# Patient Record
Sex: MALE | Race: WHITE | Employment: FULL TIME | URBAN - METROPOLITAN AREA
[De-identification: names, ages, dates, MRNs, and addresses within clinical notes are randomized per-mention and may not be internally consistent; named-entity substitution may affect disease eponyms.]

---

## 2018-05-21 ENCOUNTER — NURSE TRIAGE (OUTPATIENT)
Dept: ADMINISTRATIVE | Facility: CLINIC | Age: 33
End: 2018-05-21

## 2018-05-21 NOTE — TELEPHONE ENCOUNTER
Reason for Disposition   Chest pain lasting longer than 5 minutes    Protocols used: [facility] CHEST PAIN-A-[facility]    Rahul is calling to request to establish care with a PCP.  States is having chest pressure that has been present for about two weeks.  Per protocol advised ER for cardiac clearance.  Will go to ER and once cleared will call back for an appointment.

## 2019-02-19 ENCOUNTER — OFFICE VISIT (OUTPATIENT)
Dept: URGENT CARE | Facility: CLINIC | Age: 34
End: 2019-02-19
Payer: COMMERCIAL

## 2019-02-19 VITALS
DIASTOLIC BLOOD PRESSURE: 84 MMHG | SYSTOLIC BLOOD PRESSURE: 140 MMHG | TEMPERATURE: 98 F | OXYGEN SATURATION: 99 % | HEART RATE: 86 BPM | HEIGHT: 70 IN | RESPIRATION RATE: 12 BRPM | WEIGHT: 180 LBS | BODY MASS INDEX: 25.77 KG/M2

## 2019-02-19 DIAGNOSIS — J06.9 VIRAL URI WITH COUGH: Primary | ICD-10-CM

## 2019-02-19 PROCEDURE — 3008F PR BODY MASS INDEX (BMI) DOCUMENTED: ICD-10-PCS | Mod: CPTII,S$GLB,, | Performed by: NURSE PRACTITIONER

## 2019-02-19 PROCEDURE — 99203 OFFICE O/P NEW LOW 30 MIN: CPT | Mod: S$GLB,,, | Performed by: NURSE PRACTITIONER

## 2019-02-19 PROCEDURE — 3008F BODY MASS INDEX DOCD: CPT | Mod: CPTII,S$GLB,, | Performed by: NURSE PRACTITIONER

## 2019-02-19 PROCEDURE — 99203 PR OFFICE/OUTPT VISIT, NEW, LEVL III, 30-44 MIN: ICD-10-PCS | Mod: S$GLB,,, | Performed by: NURSE PRACTITIONER

## 2019-02-19 RX ORDER — FLUTICASONE PROPIONATE 50 MCG
1 SPRAY, SUSPENSION (ML) NASAL 2 TIMES DAILY
Qty: 1 BOTTLE | Refills: 0 | Status: SHIPPED | OUTPATIENT
Start: 2019-02-19 | End: 2019-03-05

## 2019-02-19 RX ORDER — ALBUTEROL SULFATE 90 UG/1
2 AEROSOL, METERED RESPIRATORY (INHALATION) EVERY 6 HOURS PRN
Qty: 18 G | Refills: 0 | Status: SHIPPED | OUTPATIENT
Start: 2019-02-19 | End: 2019-10-28

## 2019-02-19 RX ORDER — MULTIVITAMIN
1 TABLET ORAL
COMMUNITY

## 2019-02-19 NOTE — PATIENT INSTRUCTIONS
Please use the prescribed inhaler prior to activity when coughing begins as you described.    Viral Upper Respiratory Illness (Adult)  You have a viral upper respiratory illness (URI), which is another term for the common cold. This illness is contagious during the first few days. It is spread through the air by coughing and sneezing. It may also be spread by direct contact (touching the sick person and then touching your own eyes, nose, or mouth). Frequent handwashing will decrease risk of spread. Most viral illnesses go away within 7 to 10 days with rest and simple home remedies. Sometimes the illness may last for several weeks. Antibiotics will not kill a virus, and they are generally not prescribed for this condition.    Home care  · If symptoms are severe, rest at home for the first 2 to 3 days. When you resume activity, don't let yourself get too tired.  · Avoid being exposed to cigarette smoke (yours or others).  · You may use acetaminophen or ibuprofen to control pain and fever, unless another medicine was prescribed. (Note: If you have chronic liver or kidney disease, have ever had a stomach ulcer or gastrointestinal bleeding, or are taking blood-thinning medicines, talk with your healthcare provider before using these medicines.) Aspirin should never be given to anyone under 18 years of age who is ill with a viral infection or fever. It may cause severe liver or brain damage.  · Your appetite may be poor, so a light diet is fine. Avoid dehydration by drinking 6 to 8 glasses of fluids per day (water, soft drinks, juices, tea, or soup). Extra fluids will help loosen secretions in the nose and lungs.  · Over-the-counter cold medicines will not shorten the length of time youre sick, but they may be helpful for the following symptoms: cough, sore throat, and nasal and sinus congestion. (Note: Do not use decongestants if you have high blood pressure.)  Follow-up care  Follow up with your healthcare provider,  or as advised.  When to seek medical advice  Call your healthcare provider right away if any of these occur:  · Cough with lots of colored sputum (mucus)  · Severe headache; face, neck, or ear pain  · Difficulty swallowing due to throat pain  · Fever of 100.4°F (38°C)  Call 911, or get immediate medical care  Call emergency services right away if any of these occur:  · Chest pain, shortness of breath, wheezing, or difficulty breathing  · Coughing up blood  · Inability to swallow due to throat pain  Date Last Reviewed: 9/13/2015  © 4345-3281 [employer]. [address] [place] [address]. All rights reserved. This information is not intended as a substitute for professional medical care. Always follow your healthcare professional's instructions.

## 2019-02-19 NOTE — PROGRESS NOTES
"Subjective:       Patient ID: Rahul Farrar is a 33 y.o. male.    Vitals:  height is 5' 10" (1.778 m) and weight is 81.6 kg (180 lb). His oral temperature is 97.9 °F (36.6 °C). His blood pressure is 140/84 (abnormal) and his pulse is 86. His respiration is 12 and oxygen saturation is 99%.     Chief Complaint: URI    Patient presents with productive cough, chest congestion, and chest tightness (due to the cough) for about a week.  He states that it started off with a sore throat, but it has now subsided.  He has been taking OTC Nyquil & Sudafed.      No fever. Some chills first day or two. No known sick contacts. No N/V/D.    Patient states coughing begins with activity and after drinking coffee.  Previous reports of "chest tightness" in chart.  Possible airway restriction (asthma/COPD, etc.). Will Rx albuterol inhaler to monitor for improvement.        URI    This is a new problem. The current episode started in the past 7 days. The problem has been gradually worsening. There has been no fever. Associated symptoms include congestion, coughing, headaches, rhinorrhea, sneezing and swollen glands. Pertinent negatives include no abdominal pain, chest pain, diarrhea, dysuria, ear pain, joint pain, joint swelling, nausea, neck pain, plugged ear sensation, rash, sinus pain, sore throat, vomiting or wheezing. He has tried decongestant for the symptoms. The treatment provided no relief.       Constitution: Negative for chills, sweating, fatigue and fever.   HENT: Positive for congestion, postnasal drip, sinus pressure and voice change. Negative for ear pain, sinus pain and sore throat.    Neck: Negative for neck pain and painful lymph nodes.   Cardiovascular: Negative for chest pain.   Eyes: Negative for eye redness.   Respiratory: Positive for chest tightness (due to cough), cough and sputum production (greenish). Negative for bloody sputum, COPD, shortness of breath, stridor, wheezing and asthma.    Gastrointestinal: " Negative for abdominal pain, nausea, vomiting and diarrhea.   Genitourinary: Negative for dysuria.   Musculoskeletal: Negative for muscle ache.   Skin: Negative for rash.   Allergic/Immunologic: Positive for sneezing. Negative for seasonal allergies and asthma.   Neurological: Positive for headaches.   Hematologic/Lymphatic: Negative for swollen lymph nodes.       Objective:      Physical Exam   Constitutional: He is oriented to person, place, and time. He appears well-developed and well-nourished. He is cooperative.  Non-toxic appearance. He does not appear ill. No distress.   HENT:   Head: Normocephalic and atraumatic.   Right Ear: Hearing, tympanic membrane, external ear and ear canal normal.   Left Ear: Hearing, tympanic membrane, external ear and ear canal normal.   Nose: Rhinorrhea present. No mucosal edema or nasal deformity. No epistaxis. Right sinus exhibits no maxillary sinus tenderness and no frontal sinus tenderness. Left sinus exhibits no maxillary sinus tenderness and no frontal sinus tenderness.   Mouth/Throat: Uvula is midline and mucous membranes are normal. No trismus in the jaw. Normal dentition. No uvula swelling. Posterior oropharyngeal erythema present.       Eyes: Conjunctivae and lids are normal. No scleral icterus.   Sclera clear bilat   Neck: Trachea normal, full passive range of motion without pain and phonation normal. Neck supple.   Cardiovascular: Normal rate, regular rhythm, normal heart sounds, intact distal pulses and normal pulses.   Pulmonary/Chest: Effort normal and breath sounds normal. No stridor. No respiratory distress. He has no decreased breath sounds. He has no wheezes.   Abdominal: Soft. Normal appearance and bowel sounds are normal. He exhibits no distension. There is no tenderness.   Musculoskeletal: Normal range of motion. He exhibits no edema or deformity.   Neurological: He is alert and oriented to person, place, and time. He exhibits normal muscle tone. Coordination  normal.   Skin: Skin is warm, dry and intact. He is not diaphoretic. No pallor.   Psychiatric: He has a normal mood and affect. His speech is normal and behavior is normal. Judgment and thought content normal. Cognition and memory are normal.   Nursing note and vitals reviewed.      Assessment:       1. Viral URI with cough        Plan:         Viral URI with cough  -     albuterol (VENTOLIN HFA) 90 mcg/actuation inhaler; Inhale 2 puffs into the lungs every 6 (six) hours as needed for Wheezing. Rescue  Dispense: 18 g; Refill: 0  -     fluticasone (FLONASE) 50 mcg/actuation nasal spray; 1 spray (50 mcg total) by Each Nare route 2 (two) times daily. for 14 days  Dispense: 1 Bottle; Refill: 0      Patient Instructions     Please use the prescribed inhaler prior to activity when coughing begins as you described.    Viral Upper Respiratory Illness (Adult)  You have a viral upper respiratory illness (URI), which is another term for the common cold. This illness is contagious during the first few days. It is spread through the air by coughing and sneezing. It may also be spread by direct contact (touching the sick person and then touching your own eyes, nose, or mouth). Frequent handwashing will decrease risk of spread. Most viral illnesses go away within 7 to 10 days with rest and simple home remedies. Sometimes the illness may last for several weeks. Antibiotics will not kill a virus, and they are generally not prescribed for this condition.    Home care  · If symptoms are severe, rest at home for the first 2 to 3 days. When you resume activity, don't let yourself get too tired.  · Avoid being exposed to cigarette smoke (yours or others).  · You may use acetaminophen or ibuprofen to control pain and fever, unless another medicine was prescribed. (Note: If you have chronic liver or kidney disease, have ever had a stomach ulcer or gastrointestinal bleeding, or are taking blood-thinning medicines, talk with your healthcare  provider before using these medicines.) Aspirin should never be given to anyone under 18 years of age who is ill with a viral infection or fever. It may cause severe liver or brain damage.  · Your appetite may be poor, so a light diet is fine. Avoid dehydration by drinking 6 to 8 glasses of fluids per day (water, soft drinks, juices, tea, or soup). Extra fluids will help loosen secretions in the nose and lungs.  · Over-the-counter cold medicines will not shorten the length of time youre sick, but they may be helpful for the following symptoms: cough, sore throat, and nasal and sinus congestion. (Note: Do not use decongestants if you have high blood pressure.)  Follow-up care  Follow up with your healthcare provider, or as advised.  When to seek medical advice  Call your healthcare provider right away if any of these occur:  · Cough with lots of colored sputum (mucus)  · Severe headache; face, neck, or ear pain  · Difficulty swallowing due to throat pain  · Fever of 100.4°F (38°C)  Call 911, or get immediate medical care  Call emergency services right away if any of these occur:  · Chest pain, shortness of breath, wheezing, or difficulty breathing  · Coughing up blood  · Inability to swallow due to throat pain  Date Last Reviewed: 9/13/2015  © 4020-6864 [employer]. [address] [place] [address], [place] [address]. All rights reserved. This information is not intended as a substitute for professional medical care. Always follow your healthcare professional's instructions.

## 2019-02-22 ENCOUNTER — TELEPHONE (OUTPATIENT)
Dept: URGENT CARE | Facility: CLINIC | Age: 34
End: 2019-02-22

## 2019-04-28 ENCOUNTER — HOSPITAL ENCOUNTER (EMERGENCY)
Facility: HOSPITAL | Age: 34
Discharge: ANOTHER HOSPITAL | End: 2019-04-28
Attending: EMERGENCY MEDICINE
Payer: COMMERCIAL

## 2019-04-28 VITALS
RESPIRATION RATE: 16 BRPM | BODY MASS INDEX: 27 KG/M2 | WEIGHT: 188.19 LBS | OXYGEN SATURATION: 100 % | HEART RATE: 81 BPM | SYSTOLIC BLOOD PRESSURE: 131 MMHG | TEMPERATURE: 98 F | DIASTOLIC BLOOD PRESSURE: 68 MMHG

## 2019-04-28 DIAGNOSIS — S39.840A FRACTURE OF CORPUS CAVERNOSUM PENIS, INITIAL ENCOUNTER: Primary | ICD-10-CM

## 2019-04-28 PROCEDURE — 99285 EMERGENCY DEPT VISIT HI MDM: CPT | Mod: 25

## 2019-04-28 PROCEDURE — 63600175 PHARM REV CODE 636 W HCPCS: Performed by: EMERGENCY MEDICINE

## 2019-04-28 PROCEDURE — 96372 THER/PROPH/DIAG INJ SC/IM: CPT

## 2019-04-28 RX ADMIN — LORAZEPAM 0.5 MG: 2 INJECTION INTRAMUSCULAR; INTRAVENOUS at 06:04

## 2019-04-28 NOTE — ED PROVIDER NOTES
"SCRIBE #1 NOTE: I, Kelsie Rea, am scribing for, and in the presence of, Luis M Blanca Jr., MD. I have scribed the entire note.       History     Chief Complaint   Patient presents with    Penis Injury     "I think I fracutred my penis through intercourse"; was sent here by urgent care; penis is swollen, throbbing, painful, and turning purple     Review of patient's allergies indicates:  No Known Allergies      History of Present Illness     HPI    4/28/2019, 6:40 PM  History obtained from the patient      History of Present Illness: Rahul Farrar is a 33 y.o. male patient with no significant PMHx who presents to the Emergency Department for evaluation of a penis injury which onset suddenly around one hour pta. Pt states that he was having sexual intercourse when he injured his penis. Pt was initially seen at urgent care who advised to pt to come to the ED for further evaluation. He reports that the penis was erect at the time of injury, and that his penis has become progressively swollen over time. Symptoms are constant and moderate in severity. No mitigating or exacerbating factors reported. Associated sxs include penile swelling, penile ecchymosis, penile pain, and anxiety. Patient denies any CP, SOB, n/v/d, abdominal pain, difficulty urinating, hematuria, penile discharge, urine decreases, testicular pain, and all other sxs at this time.  No further complaints or concerns at this time.     Arrival mode: Personal vehicle      PCP: Primary Doctor No        Past Medical History:  History reviewed. No pertinent medical history.    Past Surgical History:  Past Surgical History:   Procedure Laterality Date    FOREARM FRACTURE SURGERY      NOSE SURGERY      TYMPANOSTOMY TUBE PLACEMENT           Family History:  Family History   Problem Relation Age of Onset    Diabetes Maternal Grandfather        Social History:  Social History     Tobacco Use    Smoking status: Never Smoker    Smokeless tobacco: Current " User     Types: Chew   Substance and Sexual Activity    Alcohol use: Yes     Frequency: 2-4 times a month     Drinks per session: 1 or 2    Drug use: No    Sexual activity: Female        Review of Systems     Review of Systems   Constitutional: Negative for chills and fever.   HENT: Negative for congestion and sore throat.    Respiratory: Negative for cough and shortness of breath.    Cardiovascular: Negative for chest pain and palpitations.   Gastrointestinal: Negative for abdominal pain, diarrhea, nausea and vomiting.   Genitourinary: Positive for genital sores, penile pain and penile swelling. Negative for decreased urine volume, difficulty urinating, discharge, dysuria, hematuria and testicular pain.        + penile ecchymosis   Musculoskeletal: Negative for back pain and neck pain.   Skin: Negative for rash.   Neurological: Negative for weakness and headaches.   Hematological: Does not bruise/bleed easily.   Psychiatric/Behavioral: The patient is nervous/anxious.    All other systems reviewed and are negative.     Physical Exam     Initial Vitals [04/28/19 1816]   BP Pulse Resp Temp SpO2   132/69 74 18 98.4 °F (36.9 °C) 99 %      MAP       --          Physical Exam  Nursing Notes and Vital Signs Reviewed.  Constitutional: Patient is in no acute distress. Well-developed and well-nourished.  Head: Atraumatic. Normocephalic.  Eyes: PERRL. EOM intact. Conjunctivae are not pale. No scleral icterus.  ENT: Mucous membranes are moist. Oropharynx is clear and symmetric.    Neck: Supple. Full ROM. No lymphadenopathy.  Cardiovascular: Regular rate. Regular rhythm. No murmurs, rubs, or gallops. Distal pulses are 2+ and symmetric.  Pulmonary/Chest: No respiratory distress. Clear to auscultation bilaterally. No wheezing or rales.  Abdominal: Soft and non-distended.  There is no tenderness.  No rebound, guarding, or rigidity. Good bowel sounds.  Genitourinary: No CVA tenderness. Penis is no longer erect. Ecchymosis and  swelling of the penis shaft, but not involving the glans.  Musculoskeletal: Moves all extremities. No obvious deformities. No edema. No calf tenderness.  Skin: Warm and dry.  Neurological:  Alert, awake, and appropriate.  Normal speech.  No acute focal neurological deficits are appreciated.  Psychiatric: Normal affect. Good eye contact. Appropriate in content.     ED Course   Procedures  ED Vital Signs:  Vitals:    04/28/19 1816 04/28/19 1900   BP: 132/69 (!) 141/73   Pulse: 74 79   Resp: 18 18   Temp: 98.4 °F (36.9 °C)    TempSrc: Oral    SpO2: 99% 99%   Weight: 85.3 kg (188 lb 2.6 oz)        Abnormal Lab Results:  Labs Reviewed - No data to display   Imaging Results:  Imaging Results    None                 The Emergency Provider reviewed the vital signs and test results, which are outlined above.     ED Discussion     7:10 PM: Re-evaluated pt. Informed pt and family that there are no urology services available at this time. I have discussed test results, shared treatment plan, and the need for transfer with patient and family at bedside. All historical, clinical, radiographic, and laboratory findings were reviewed with the patient/family in detail. Patient will be transferred by private vehicle. Patient understands that there could be unforeseen motor vehicle accidents or loss of vital signs that could result in potential death or permanent disability. Pt and family express understanding at this time and agree with all information. All questions answered. Pt and family have no further questions or concerns at this time. Pt is ready for transfer.     7: 10 PM: Consult with Dr. Crews (Emergency Medicine) at [facility] concerning pt. There are no urology services, which the patient requires, offered at [facility] at this time. Dr. Crews expresses understanding and will accept transfer for urology care.  Accepting Facility: [facility]  Accepting Physician: Dr. Crews      ED Medication(s):  Medications    lorazepam (ATIVAN) injection 0.5 mg (0.5 mg Intramuscular Given 4/28/19 7921)       New Prescriptions    No medications on file                             Scribe Attestation:   Scribe #1: I performed the above scribed service and the documentation accurately describes the services I performed. I attest to the accuracy of the note.     Attending:   Physician Attestation Statement for Scribe #1: I, Luis M Blanca Jr., MD, personally performed the services described in this documentation, as scribed by Kelsie Rea, in my presence, and it is both accurate and complete.           Clinical Impression       ICD-10-CM ICD-9-CM   1. Fracture of corpus cavernosum penis, initial encounter S39.840A 959.13         Disposition:   Disposition: Transferred  Condition: Stable         Luis M Blanca Jr., MD  04/28/19 3076

## 2019-10-28 ENCOUNTER — OFFICE VISIT (OUTPATIENT)
Dept: INTERNAL MEDICINE | Facility: CLINIC | Age: 34
End: 2019-10-28
Payer: COMMERCIAL

## 2019-10-28 VITALS
WEIGHT: 202.81 LBS | HEIGHT: 70 IN | OXYGEN SATURATION: 99 % | SYSTOLIC BLOOD PRESSURE: 122 MMHG | BODY MASS INDEX: 29.03 KG/M2 | TEMPERATURE: 98 F | HEART RATE: 65 BPM | DIASTOLIC BLOOD PRESSURE: 72 MMHG

## 2019-10-28 DIAGNOSIS — M79.672 LEFT FOOT PAIN: ICD-10-CM

## 2019-10-28 DIAGNOSIS — Z00.00 ROUTINE CHECK-UP: Primary | ICD-10-CM

## 2019-10-28 PROCEDURE — 99203 PR OFFICE/OUTPT VISIT, NEW, LEVL III, 30-44 MIN: ICD-10-PCS | Mod: 25,S$GLB,, | Performed by: NURSE PRACTITIONER

## 2019-10-28 PROCEDURE — 3008F PR BODY MASS INDEX (BMI) DOCUMENTED: ICD-10-PCS | Mod: CPTII,S$GLB,, | Performed by: NURSE PRACTITIONER

## 2019-10-28 PROCEDURE — 90471 IMMUNIZATION ADMIN: CPT | Mod: S$GLB,,, | Performed by: NURSE PRACTITIONER

## 2019-10-28 PROCEDURE — 90686 FLU VACCINE (QUAD) GREATER THAN OR EQUAL TO 3YO PRESERVATIVE FREE IM: ICD-10-PCS | Mod: S$GLB,,, | Performed by: NURSE PRACTITIONER

## 2019-10-28 PROCEDURE — 90471 FLU VACCINE (QUAD) GREATER THAN OR EQUAL TO 3YO PRESERVATIVE FREE IM: ICD-10-PCS | Mod: S$GLB,,, | Performed by: NURSE PRACTITIONER

## 2019-10-28 PROCEDURE — 3008F BODY MASS INDEX DOCD: CPT | Mod: CPTII,S$GLB,, | Performed by: NURSE PRACTITIONER

## 2019-10-28 PROCEDURE — 99203 OFFICE O/P NEW LOW 30 MIN: CPT | Mod: 25,S$GLB,, | Performed by: NURSE PRACTITIONER

## 2019-10-28 PROCEDURE — 90686 IIV4 VACC NO PRSV 0.5 ML IM: CPT | Mod: S$GLB,,, | Performed by: NURSE PRACTITIONER

## 2019-10-28 PROCEDURE — 99999 PR PBB SHADOW E&M-EST. PATIENT-LVL III: CPT | Mod: PBBFAC,,, | Performed by: NURSE PRACTITIONER

## 2019-10-28 PROCEDURE — 99999 PR PBB SHADOW E&M-EST. PATIENT-LVL III: ICD-10-PCS | Mod: PBBFAC,,, | Performed by: NURSE PRACTITIONER

## 2019-10-28 NOTE — PROGRESS NOTES
"Subjective:      Patient ID: Rahul Farrar is a 34 y.o. male.    Chief Complaint: Establish Care    HPI:  Patient is looking to est care with a pcp at this location.  Wants to keep up with his healthcare.  Says he feels ok, only has pain to his left foot, wears steel-toe boots at work.  Says it feels like he is walking on a rock in his shoe.  Says he has trimmed down one area a few times, but it comes back    No past medical history on file.    Past Surgical History:   Procedure Laterality Date    FOREARM FRACTURE SURGERY      NOSE SURGERY      SINUS SURGERY      TYMPANOSTOMY TUBE PLACEMENT         Lab Results   Component Value Date    WBC 7.07 07/29/2014    HGB 16.3 07/29/2014    HCT 45.4 07/29/2014     07/29/2014    CHOL 158 03/20/2009    TRIG 62 03/20/2009    HDL 35 (L) 03/20/2009    ALT 23 07/29/2014    AST 19 07/29/2014     07/29/2014    K 3.3 (L) 07/29/2014     07/29/2014    CREATININE 1.2 07/29/2014    BUN 11 07/29/2014    CO2 24 07/29/2014    TSH 0.524 07/29/2014       /72 (BP Location: Left arm)   Pulse 65   Temp 98.2 °F (36.8 °C) (Tympanic)   Ht 5' 10" (1.778 m)   Wt 92 kg (202 lb 13.2 oz)   SpO2 99%   BMI 29.10 kg/m²       Review of Systems   Constitutional: Negative for appetite change, chills, diaphoresis and fever.   HENT: Negative for congestion, ear pain, postnasal drip, rhinorrhea, sneezing, sore throat and trouble swallowing.    Eyes: Negative for photophobia, pain and visual disturbance.   Respiratory: Negative for apnea, cough, choking, chest tightness, shortness of breath and wheezing.    Cardiovascular: Negative for chest pain, palpitations and leg swelling.   Gastrointestinal: Negative for abdominal pain, constipation, diarrhea, nausea and vomiting.   Genitourinary: Negative for decreased urine volume, difficulty urinating, dysuria, hematuria and urgency.   Musculoskeletal: Negative for arthralgias, gait problem, joint swelling and myalgias.   Skin: " Negative for rash.   Neurological: Negative for dizziness, tremors, seizures, syncope, weakness, light-headedness, numbness and headaches.   Psychiatric/Behavioral: Negative for agitation, confusion, decreased concentration, hallucinations and sleep disturbance. The patient is not nervous/anxious.       Objective:     Physical Exam   Constitutional: He is oriented to person, place, and time. He appears well-developed and well-nourished. No distress.   Musculoskeletal:   Normal gait  Plantar side of left foot, lateral, corn noted from friction, two other places near are tender, thick skin, ? Corn or plantar warts   Neurological: He is alert and oriented to person, place, and time.   Skin: Skin is warm and dry.   Psychiatric: He has a normal mood and affect. His behavior is normal.     Assessment:      1. Routine check-up    2. Left foot pain      Plan:   Routine check-up  -     Lipid panel; Future; Expected date: 01/26/2020  -     Comprehensive metabolic panel; Future; Expected date: 01/26/2020  -     TSH; Future; Expected date: 01/26/2020  -     CBC auto differential; Future; Expected date: 01/26/2020    Left foot pain  -     Ambulatory Referral to Podiatry    Other orders  -     Influenza - Quadrivalent (PF)    he had a regular flu shot, will see podiatry for foot care.  Will have fasting labs and see Dr Long to est care as his pcp per his request      Current Outpatient Medications:     multivitamin (ONE DAILY MULTIVITAMIN) per tablet, Take 1 tablet by mouth., Disp: , Rfl:

## 2019-10-30 ENCOUNTER — LAB VISIT (OUTPATIENT)
Dept: LAB | Facility: HOSPITAL | Age: 34
End: 2019-10-30
Attending: FAMILY MEDICINE
Payer: COMMERCIAL

## 2019-10-30 ENCOUNTER — OFFICE VISIT (OUTPATIENT)
Dept: PODIATRY | Facility: CLINIC | Age: 34
End: 2019-10-30
Payer: COMMERCIAL

## 2019-10-30 ENCOUNTER — OFFICE VISIT (OUTPATIENT)
Dept: INTERNAL MEDICINE | Facility: CLINIC | Age: 34
End: 2019-10-30
Payer: COMMERCIAL

## 2019-10-30 VITALS
WEIGHT: 205.25 LBS | OXYGEN SATURATION: 98 % | TEMPERATURE: 97 F | DIASTOLIC BLOOD PRESSURE: 78 MMHG | HEART RATE: 57 BPM | HEIGHT: 70 IN | SYSTOLIC BLOOD PRESSURE: 126 MMHG | BODY MASS INDEX: 29.38 KG/M2

## 2019-10-30 VITALS
SYSTOLIC BLOOD PRESSURE: 119 MMHG | WEIGHT: 202.81 LBS | BODY MASS INDEX: 29.03 KG/M2 | HEIGHT: 70 IN | HEART RATE: 75 BPM | DIASTOLIC BLOOD PRESSURE: 71 MMHG

## 2019-10-30 DIAGNOSIS — Z00.00 ROUTINE ADULT HEALTH MAINTENANCE: Primary | ICD-10-CM

## 2019-10-30 DIAGNOSIS — Z00.00 ROUTINE CHECK-UP: ICD-10-CM

## 2019-10-30 DIAGNOSIS — R60.0 BILATERAL EDEMA OF LOWER EXTREMITY: ICD-10-CM

## 2019-10-30 DIAGNOSIS — Q82.8 POROKERATOSIS: Primary | ICD-10-CM

## 2019-10-30 LAB
ALBUMIN SERPL BCP-MCNC: 4 G/DL (ref 3.5–5.2)
ALP SERPL-CCNC: 44 U/L (ref 55–135)
ALT SERPL W/O P-5'-P-CCNC: 25 U/L (ref 10–44)
ANION GAP SERPL CALC-SCNC: 7 MMOL/L (ref 8–16)
AST SERPL-CCNC: 21 U/L (ref 10–40)
BASOPHILS # BLD AUTO: 0.03 K/UL (ref 0–0.2)
BASOPHILS NFR BLD: 0.7 % (ref 0–1.9)
BILIRUB SERPL-MCNC: 1.1 MG/DL (ref 0.1–1)
BUN SERPL-MCNC: 11 MG/DL (ref 6–20)
CALCIUM SERPL-MCNC: 9.2 MG/DL (ref 8.7–10.5)
CHLORIDE SERPL-SCNC: 105 MMOL/L (ref 95–110)
CHOLEST SERPL-MCNC: 157 MG/DL (ref 120–199)
CHOLEST/HDLC SERPL: 2.7 {RATIO} (ref 2–5)
CO2 SERPL-SCNC: 27 MMOL/L (ref 23–29)
CREAT SERPL-MCNC: 0.9 MG/DL (ref 0.5–1.4)
DIFFERENTIAL METHOD: ABNORMAL
EOSINOPHIL # BLD AUTO: 0.2 K/UL (ref 0–0.5)
EOSINOPHIL NFR BLD: 3.3 % (ref 0–8)
ERYTHROCYTE [DISTWIDTH] IN BLOOD BY AUTOMATED COUNT: 11.1 % (ref 11.5–14.5)
EST. GFR  (AFRICAN AMERICAN): >60 ML/MIN/1.73 M^2
EST. GFR  (NON AFRICAN AMERICAN): >60 ML/MIN/1.73 M^2
GLUCOSE SERPL-MCNC: 92 MG/DL (ref 70–110)
HCT VFR BLD AUTO: 44.7 % (ref 40–54)
HDLC SERPL-MCNC: 59 MG/DL (ref 40–75)
HDLC SERPL: 37.6 % (ref 20–50)
HGB BLD-MCNC: 15.2 G/DL (ref 14–18)
IMM GRANULOCYTES # BLD AUTO: 0 K/UL (ref 0–0.04)
IMM GRANULOCYTES NFR BLD AUTO: 0 % (ref 0–0.5)
LDLC SERPL CALC-MCNC: 86 MG/DL (ref 63–159)
LYMPHOCYTES # BLD AUTO: 1.9 K/UL (ref 1–4.8)
LYMPHOCYTES NFR BLD: 42.3 % (ref 18–48)
MCH RBC QN AUTO: 30.8 PG (ref 27–31)
MCHC RBC AUTO-ENTMCNC: 34 G/DL (ref 32–36)
MCV RBC AUTO: 91 FL (ref 82–98)
MONOCYTES # BLD AUTO: 0.5 K/UL (ref 0.3–1)
MONOCYTES NFR BLD: 11.5 % (ref 4–15)
NEUTROPHILS # BLD AUTO: 1.9 K/UL (ref 1.8–7.7)
NEUTROPHILS NFR BLD: 42.2 % (ref 38–73)
NONHDLC SERPL-MCNC: 98 MG/DL
NRBC BLD-RTO: 0 /100 WBC
PLATELET # BLD AUTO: 233 K/UL (ref 150–350)
PMV BLD AUTO: 11.8 FL (ref 9.2–12.9)
POTASSIUM SERPL-SCNC: 4.3 MMOL/L (ref 3.5–5.1)
PROT SERPL-MCNC: 6.7 G/DL (ref 6–8.4)
RBC # BLD AUTO: 4.94 M/UL (ref 4.6–6.2)
SODIUM SERPL-SCNC: 139 MMOL/L (ref 136–145)
TRIGL SERPL-MCNC: 60 MG/DL (ref 30–150)
TSH SERPL DL<=0.005 MIU/L-ACNC: 1.09 UIU/ML (ref 0.4–4)
WBC # BLD AUTO: 4.52 K/UL (ref 3.9–12.7)

## 2019-10-30 PROCEDURE — 3008F PR BODY MASS INDEX (BMI) DOCUMENTED: ICD-10-PCS | Mod: CPTII,S$GLB,, | Performed by: FAMILY MEDICINE

## 2019-10-30 PROCEDURE — 99999 PR PBB SHADOW E&M-EST. PATIENT-LVL III: ICD-10-PCS | Mod: PBBFAC,,, | Performed by: PODIATRIST

## 2019-10-30 PROCEDURE — 80053 COMPREHEN METABOLIC PANEL: CPT

## 2019-10-30 PROCEDURE — 80061 LIPID PANEL: CPT

## 2019-10-30 PROCEDURE — 99243 OFF/OP CNSLTJ NEW/EST LOW 30: CPT | Mod: S$GLB,,, | Performed by: PODIATRIST

## 2019-10-30 PROCEDURE — 99999 PR PBB SHADOW E&M-EST. PATIENT-LVL III: CPT | Mod: PBBFAC,,, | Performed by: FAMILY MEDICINE

## 2019-10-30 PROCEDURE — 36415 COLL VENOUS BLD VENIPUNCTURE: CPT | Mod: PO

## 2019-10-30 PROCEDURE — 99999 PR PBB SHADOW E&M-EST. PATIENT-LVL III: ICD-10-PCS | Mod: PBBFAC,,, | Performed by: FAMILY MEDICINE

## 2019-10-30 PROCEDURE — 3008F BODY MASS INDEX DOCD: CPT | Mod: CPTII,S$GLB,, | Performed by: FAMILY MEDICINE

## 2019-10-30 PROCEDURE — 99213 PR OFFICE/OUTPT VISIT, EST, LEVL III, 20-29 MIN: ICD-10-PCS | Mod: 25,S$GLB,, | Performed by: FAMILY MEDICINE

## 2019-10-30 PROCEDURE — 99243 PR OFFICE CONSULTATION,LEVEL III: ICD-10-PCS | Mod: S$GLB,,, | Performed by: PODIATRIST

## 2019-10-30 PROCEDURE — 84443 ASSAY THYROID STIM HORMONE: CPT

## 2019-10-30 PROCEDURE — 90715 TDAP VACCINE GREATER THAN OR EQUAL TO 7YO IM: ICD-10-PCS | Mod: S$GLB,,, | Performed by: FAMILY MEDICINE

## 2019-10-30 PROCEDURE — 90715 TDAP VACCINE 7 YRS/> IM: CPT | Mod: S$GLB,,, | Performed by: FAMILY MEDICINE

## 2019-10-30 PROCEDURE — 90471 IMMUNIZATION ADMIN: CPT | Mod: S$GLB,,, | Performed by: FAMILY MEDICINE

## 2019-10-30 PROCEDURE — 90471 TDAP VACCINE GREATER THAN OR EQUAL TO 7YO IM: ICD-10-PCS | Mod: S$GLB,,, | Performed by: FAMILY MEDICINE

## 2019-10-30 PROCEDURE — 99999 PR PBB SHADOW E&M-EST. PATIENT-LVL III: CPT | Mod: PBBFAC,,, | Performed by: PODIATRIST

## 2019-10-30 PROCEDURE — 99213 OFFICE O/P EST LOW 20 MIN: CPT | Mod: 25,S$GLB,, | Performed by: FAMILY MEDICINE

## 2019-10-30 PROCEDURE — 85025 COMPLETE CBC W/AUTO DIFF WBC: CPT

## 2019-10-30 RX ORDER — FUROSEMIDE 20 MG/1
20 TABLET ORAL DAILY PRN
Qty: 30 TABLET | Refills: 6 | Status: SHIPPED | OUTPATIENT
Start: 2019-10-30 | End: 2020-01-06

## 2019-10-30 NOTE — PROGRESS NOTES
Subjective:      Patient ID: Rahul Farrar is a 34 y.o. male.    Chief Complaint: Establish Care      Patient here today to establish care.  He was seen by Sukhjinder Serrato on 2 days ago, had routine labs drawn this morning.  He is normally in good health and takes no routine medications.  He does have pain in the bottom of his left foot for which he has in a podiatry appointment this afternoon.  He reports bilateral swelling in his feet intermittently which makes it painful to walk, no current swelling, has never taken diuretics for this.  He reports recently found that he is expecting a child.    Review of Systems   Constitutional: Negative for activity change, appetite change, fatigue and fever.   Eyes: Negative for visual disturbance.   Respiratory: Negative for shortness of breath.    Cardiovascular: Positive for leg swelling. Negative for palpitations.   Gastrointestinal: Negative for abdominal pain, constipation and diarrhea.   Musculoskeletal: Positive for gait problem.   Psychiatric/Behavioral: Negative for sleep disturbance.     History reviewed. No pertinent past medical history.  Past Surgical History:   Procedure Laterality Date    FOREARM FRACTURE SURGERY      NOSE SURGERY      SINUS SURGERY      TYMPANOSTOMY TUBE PLACEMENT       Family History   Problem Relation Age of Onset    Diabetes Maternal Grandfather      Social History     Socioeconomic History    Marital status:      Spouse name: Not on file    Number of children: Not on file    Years of education: Not on file    Highest education level: Not on file   Occupational History    Not on file   Social Needs    Financial resource strain: Not on file    Food insecurity:     Worry: Not on file     Inability: Not on file    Transportation needs:     Medical: Not on file     Non-medical: Not on file   Tobacco Use    Smoking status: Never Smoker    Smokeless tobacco: Current User     Types: Chew   Substance and Sexual Activity     "Alcohol use: Yes     Frequency: 2-4 times a month     Drinks per session: 1 or 2    Drug use: No    Sexual activity: Not on file   Lifestyle    Physical activity:     Days per week: Not on file     Minutes per session: Not on file    Stress: Not on file   Relationships    Social connections:     Talks on phone: Not on file     Gets together: Not on file     Attends Catholic service: Not on file     Active member of club or organization: Not on file     Attends meetings of clubs or organizations: Not on file     Relationship status: Not on file   Other Topics Concern    Not on file   Social History Narrative    Not on file     Review of patient's allergies indicates:  No Known Allergies    Objective:       /78 (BP Location: Right arm)   Pulse (!) 57   Temp 96.7 °F (35.9 °C) (Tympanic)   Ht 5' 10" (1.778 m)   Wt 93.1 kg (205 lb 4 oz)   SpO2 98%   BMI 29.45 kg/m²   Physical Exam   Constitutional: He is oriented to person, place, and time. He appears well-developed and well-nourished. No distress.   HENT:   Head: Normocephalic.   Right Ear: Hearing, tympanic membrane, external ear and ear canal normal.   Left Ear: Hearing, tympanic membrane, external ear and ear canal normal.   Nose: Nose normal. Right sinus exhibits no maxillary sinus tenderness and no frontal sinus tenderness. Left sinus exhibits no maxillary sinus tenderness and no frontal sinus tenderness.   Mouth/Throat: Uvula is midline, oropharynx is clear and moist and mucous membranes are normal. No oropharyngeal exudate.   Eyes: Pupils are equal, round, and reactive to light. Conjunctivae and EOM are normal.   Neck: Normal range of motion. Neck supple.   Cardiovascular: Normal rate, regular rhythm and normal heart sounds.   Pulmonary/Chest: Effort normal and breath sounds normal. No respiratory distress.   Abdominal: Soft. Bowel sounds are normal. There is no tenderness. There is no guarding. No hernia.   Musculoskeletal: Normal range of " motion. He exhibits no edema.   Neurological: He is alert and oriented to person, place, and time.   Skin: Skin is warm and dry. He is not diaphoretic.   Psychiatric: He has a normal mood and affect.   Nursing note and vitals reviewed.    Assessment:     1. Routine adult health maintenance    2. Bilateral edema of lower extremity      Plan:   Routine adult health maintenance    Bilateral edema of lower extremity    Other orders  -     (In Office Administered) Tdap Vaccine  -     furosemide (LASIX) 20 MG tablet; Take 1 tablet (20 mg total) by mouth daily as needed.  Dispense: 30 tablet; Refill: 6      Medication List with Changes/Refills   New Medications    FUROSEMIDE (LASIX) 20 MG TABLET    Take 1 tablet (20 mg total) by mouth daily as needed.   Current Medications    MULTIVITAMIN (ONE DAILY MULTIVITAMIN) PER TABLET    Take 1 tablet by mouth.

## 2019-10-30 NOTE — PROGRESS NOTES
Subjective:       Patient ID: Rahul Farrar is a 34 y.o. male.    Chief Complaint: Foot Pain (Pt c/o pain in L.foot, has 2 bumps on the bottom of hiis foot, rtaes pain 6/10,tennis w/socks, non diabetic pt PCP Dr. Long.)    HPI: Rahul Farrar presents to the office today, due to concern of painful skin lesions at the plantar aspect of bilateral foot, left greater than right.  Patient states discomfort with ambulation due to the skin lesions.  Patient denies local or systemic signs of infection.  Patient was referred by his primary care provider.  Patient denies any prior medical treatment at this time.    Review of patient's allergies indicates:  No Known Allergies    History reviewed. No pertinent past medical history.    Family History   Problem Relation Age of Onset    Diabetes Maternal Grandfather     Heart disease Maternal Grandfather        Social History     Socioeconomic History    Marital status:      Spouse name: Not on file    Number of children: Not on file    Years of education: Not on file    Highest education level: Not on file   Occupational History    Not on file   Social Needs    Financial resource strain: Not on file    Food insecurity:     Worry: Not on file     Inability: Not on file    Transportation needs:     Medical: Not on file     Non-medical: Not on file   Tobacco Use    Smoking status: Never Smoker    Smokeless tobacco: Current User     Types: Chew   Substance and Sexual Activity    Alcohol use: Yes     Frequency: 2-4 times a month     Drinks per session: 1 or 2    Drug use: No    Sexual activity: Not on file   Lifestyle    Physical activity:     Days per week: Not on file     Minutes per session: Not on file    Stress: Not on file   Relationships    Social connections:     Talks on phone: Not on file     Gets together: Not on file     Attends Islam service: Not on file     Active member of club or organization: Not on file     Attends meetings  "of clubs or organizations: Not on file     Relationship status: Not on file   Other Topics Concern    Not on file   Social History Narrative    Not on file       Past Surgical History:   Procedure Laterality Date    FOREARM FRACTURE SURGERY      NOSE SURGERY      SINUS SURGERY      TYMPANOSTOMY TUBE PLACEMENT         Review of Systems   Constitutional: Negative for chills, fatigue and fever.   HENT: Negative for hearing loss.    Eyes: Negative for photophobia and visual disturbance.   Respiratory: Negative for cough, chest tightness, shortness of breath and wheezing.    Cardiovascular: Negative for chest pain and palpitations.   Gastrointestinal: Negative for constipation, diarrhea, nausea and vomiting.   Endocrine: Negative for cold intolerance and heat intolerance.   Genitourinary: Negative for flank pain.   Musculoskeletal: Positive for gait problem. Negative for neck pain and neck stiffness.   Skin: Positive for wound.   Neurological: Negative for light-headedness and headaches.   Psychiatric/Behavioral: Negative for sleep disturbance.          Objective:   /71 (BP Location: Left arm, Patient Position: Sitting, BP Method: Large (Automatic))   Pulse 75   Ht 5' 10" (1.778 m)   Wt 92 kg (202 lb 13.2 oz)   BMI 29.10 kg/m²       Physical Exam  LOWER EXTREMITY PHYSICAL EXAMINATION  NEUROLOGY: Sensation to light touch is intact. Proprioception is intact. Sensation to pin prick is intact. Deep tendon reflexes of the lower extremity are WNL.    DERMATOLOGY: Skin is supple, dry and intact. No ecchymosis is noted.  There several areas IPK/porokeratosis noted to the bilateral foot, left greater than right.  No erythema or cellulitis is noted.     ORTHOPEDIC: Manual Muscle Testing is 5/5 in all planes on the left and right, without pains, with and without resistance. No pains to palpation of the medial or lateral ankle ligaments. No discomfort to palpation of the posterior tibial tendon, peroneal tendon, " Achilles tendon or the anterior ankle tendons.  Rectus foot type is noted. No pain upon range of motion of the midfoot or hindfoot joints. No crepitus upon range of motion and midfoot or hindfoot joints. No ankle pathology is noted. Gait pattern is non-antalgic.     VASCULAR: The right DP pulse is 2/4 and the left DP is 2/4. The right PT pulse is 2/4 and the left PT pulse is 2/4. Proximal to distal, warm to warm. No dependent rubor or elevation palor is noted. Capillary refill time is less than 3 seconds. Hair growth is appreciated to the dorsal foot and digits.    Assessment:     1. Porokeratosis        Plan:     Porokeratosis        Thorough discussion is had with the patient today, concerning the diagnosis, its etiology, and the treatment algorithm at present.  Recommend twice to 3 times daily topical emollient. Did discuss with the patient concerning dry and thin skin in relation to complications due to comorbid states. Patient will purchase OTC Urea 40% and use BID or TID.          No future appointments.

## 2019-12-13 ENCOUNTER — OFFICE VISIT (OUTPATIENT)
Dept: INTERNAL MEDICINE | Facility: CLINIC | Age: 34
End: 2019-12-13
Payer: COMMERCIAL

## 2019-12-13 VITALS
HEART RATE: 82 BPM | WEIGHT: 210.56 LBS | BODY MASS INDEX: 30.14 KG/M2 | SYSTOLIC BLOOD PRESSURE: 124 MMHG | HEIGHT: 70 IN | TEMPERATURE: 99 F | OXYGEN SATURATION: 97 % | DIASTOLIC BLOOD PRESSURE: 80 MMHG

## 2019-12-13 DIAGNOSIS — G47.26 SHIFT WORK SLEEP DISORDER: ICD-10-CM

## 2019-12-13 DIAGNOSIS — R53.83 FATIGUE, UNSPECIFIED TYPE: Primary | ICD-10-CM

## 2019-12-13 PROCEDURE — 99999 PR PBB SHADOW E&M-EST. PATIENT-LVL III: ICD-10-PCS | Mod: PBBFAC,,, | Performed by: FAMILY MEDICINE

## 2019-12-13 PROCEDURE — 99213 PR OFFICE/OUTPT VISIT, EST, LEVL III, 20-29 MIN: ICD-10-PCS | Mod: S$GLB,,, | Performed by: FAMILY MEDICINE

## 2019-12-13 PROCEDURE — 99213 OFFICE O/P EST LOW 20 MIN: CPT | Mod: S$GLB,,, | Performed by: FAMILY MEDICINE

## 2019-12-13 PROCEDURE — 3008F PR BODY MASS INDEX (BMI) DOCUMENTED: ICD-10-PCS | Mod: CPTII,S$GLB,, | Performed by: FAMILY MEDICINE

## 2019-12-13 PROCEDURE — 99999 PR PBB SHADOW E&M-EST. PATIENT-LVL III: CPT | Mod: PBBFAC,,, | Performed by: FAMILY MEDICINE

## 2019-12-13 PROCEDURE — 3008F BODY MASS INDEX DOCD: CPT | Mod: CPTII,S$GLB,, | Performed by: FAMILY MEDICINE

## 2019-12-13 RX ORDER — BUSPIRONE HYDROCHLORIDE 10 MG/1
10 TABLET ORAL 3 TIMES DAILY
Qty: 90 TABLET | Refills: 11 | Status: SHIPPED | OUTPATIENT
Start: 2019-12-13 | End: 2019-12-16

## 2019-12-13 NOTE — PATIENT INSTRUCTIONS
Continue to take melatonin at night . Let me know if you start having trouble sleeping with the melatonin.

## 2019-12-14 NOTE — PROGRESS NOTES
Subjective:      Patient ID: Rahul Farrar is a 34 y.o. male.    Chief Complaint: Stress; Anxiety; and Fatigue      Patient reports increased fatigue, feeling overwhelmed. He does work shift work, often has trouble sleeping. 2 days ago began trying to use melatonin and was able to sleep better with that. However still feels tired all the time, having increased anxiety with multiple increased stressors currently. He does report history of being on antidepressants years ago, does not want to be on something he has to take every day.    Review of Systems   Psychiatric/Behavioral: Positive for sleep disturbance. Negative for decreased concentration, dysphoric mood, hallucinations, self-injury and suicidal ideas. The patient is nervous/anxious.      History reviewed. No pertinent past medical history.       Past Surgical History:   Procedure Laterality Date    FOREARM FRACTURE SURGERY      NOSE SURGERY      SINUS SURGERY      TYMPANOSTOMY TUBE PLACEMENT       Family History   Problem Relation Age of Onset    Diabetes Maternal Grandfather     Heart disease Maternal Grandfather      Social History     Socioeconomic History    Marital status:      Spouse name: Not on file    Number of children: Not on file    Years of education: Not on file    Highest education level: Not on file   Occupational History    Occupation: operation   Social Needs    Financial resource strain: Not on file    Food insecurity:     Worry: Not on file     Inability: Not on file    Transportation needs:     Medical: Not on file     Non-medical: Not on file   Tobacco Use    Smoking status: Never Smoker    Smokeless tobacco: Current User     Types: Chew   Substance and Sexual Activity    Alcohol use: Yes     Frequency: 2-4 times a month     Drinks per session: 1 or 2    Drug use: No    Sexual activity: Yes     Partners: Female   Lifestyle    Physical activity:     Days per week: Not on file     Minutes per session: Not  "on file    Stress: To some extent   Relationships    Social connections:     Talks on phone: Not on file     Gets together: Not on file     Attends Shinto service: Not on file     Active member of club or organization: Not on file     Attends meetings of clubs or organizations: Not on file     Relationship status: Not on file   Other Topics Concern    Not on file   Social History Narrative    Not on file     Review of patient's allergies indicates:  No Known Allergies    Objective:       /80 (BP Location: Right arm, Patient Position: Sitting, BP Method: Large (Manual))   Pulse 82   Temp 99.4 °F (37.4 °C) (Tympanic)   Ht 5' 10" (1.778 m)   Wt 95.5 kg (210 lb 8.6 oz)   SpO2 97%   BMI 30.21 kg/m²   Physical Exam   Constitutional: He appears well-developed and well-nourished. No distress.   Skin: He is not diaphoretic.   Psychiatric: He has a normal mood and affect. His behavior is normal. Judgment and thought content normal.     Assessment:     1. Fatigue, unspecified type    2. Shift work sleep disorder      Plan:   Fatigue, unspecified type  -     CBC auto differential; Future; Expected date: 12/13/2019  -     Testosterone Panel; Future; Expected date: 03/12/2020    Shift work sleep disorder    Other orders  -     busPIRone (BUSPAR) 10 MG tablet; Take 1 tablet (10 mg total) by mouth 3 (three) times daily.  Dispense: 90 tablet; Refill: 11      Medication List with Changes/Refills   New Medications    BUSPIRONE (BUSPAR) 10 MG TABLET    Take 1 tablet (10 mg total) by mouth 3 (three) times daily.   Current Medications    FUROSEMIDE (LASIX) 20 MG TABLET    Take 1 tablet (20 mg total) by mouth daily as needed.    MULTIVITAMIN (ONE DAILY MULTIVITAMIN) PER TABLET    Take 1 tablet by mouth.     "

## 2019-12-16 ENCOUNTER — LAB VISIT (OUTPATIENT)
Dept: LAB | Facility: HOSPITAL | Age: 34
End: 2019-12-16
Attending: FAMILY MEDICINE
Payer: COMMERCIAL

## 2019-12-16 ENCOUNTER — OFFICE VISIT (OUTPATIENT)
Dept: INTERNAL MEDICINE | Facility: CLINIC | Age: 34
End: 2019-12-16
Payer: COMMERCIAL

## 2019-12-16 VITALS
HEIGHT: 70 IN | WEIGHT: 213.19 LBS | DIASTOLIC BLOOD PRESSURE: 78 MMHG | BODY MASS INDEX: 30.52 KG/M2 | TEMPERATURE: 99 F | OXYGEN SATURATION: 99 % | SYSTOLIC BLOOD PRESSURE: 118 MMHG | HEART RATE: 90 BPM

## 2019-12-16 DIAGNOSIS — R53.83 FATIGUE, UNSPECIFIED TYPE: ICD-10-CM

## 2019-12-16 DIAGNOSIS — F41.1 GAD (GENERALIZED ANXIETY DISORDER): ICD-10-CM

## 2019-12-16 DIAGNOSIS — R53.83 FATIGUE, UNSPECIFIED TYPE: Primary | ICD-10-CM

## 2019-12-16 LAB
BASOPHILS # BLD AUTO: 0.04 K/UL (ref 0–0.2)
BASOPHILS NFR BLD: 0.7 % (ref 0–1.9)
DIFFERENTIAL METHOD: ABNORMAL
EOSINOPHIL # BLD AUTO: 0.2 K/UL (ref 0–0.5)
EOSINOPHIL NFR BLD: 3.6 % (ref 0–8)
ERYTHROCYTE [DISTWIDTH] IN BLOOD BY AUTOMATED COUNT: 11.1 % (ref 11.5–14.5)
HCT VFR BLD AUTO: 42.6 % (ref 40–54)
HGB BLD-MCNC: 14.8 G/DL (ref 14–18)
IMM GRANULOCYTES # BLD AUTO: 0.01 K/UL (ref 0–0.04)
IMM GRANULOCYTES NFR BLD AUTO: 0.2 % (ref 0–0.5)
LYMPHOCYTES # BLD AUTO: 2.1 K/UL (ref 1–4.8)
LYMPHOCYTES NFR BLD: 38.2 % (ref 18–48)
MCH RBC QN AUTO: 31.2 PG (ref 27–31)
MCHC RBC AUTO-ENTMCNC: 34.7 G/DL (ref 32–36)
MCV RBC AUTO: 90 FL (ref 82–98)
MONOCYTES # BLD AUTO: 0.7 K/UL (ref 0.3–1)
MONOCYTES NFR BLD: 12.5 % (ref 4–15)
NEUTROPHILS # BLD AUTO: 2.5 K/UL (ref 1.8–7.7)
NEUTROPHILS NFR BLD: 44.8 % (ref 38–73)
NRBC BLD-RTO: 0 /100 WBC
PLATELET # BLD AUTO: 208 K/UL (ref 150–350)
PMV BLD AUTO: 12.3 FL (ref 9.2–12.9)
RBC # BLD AUTO: 4.75 M/UL (ref 4.6–6.2)
WBC # BLD AUTO: 5.53 K/UL (ref 3.9–12.7)

## 2019-12-16 PROCEDURE — 99999 PR PBB SHADOW E&M-EST. PATIENT-LVL III: ICD-10-PCS | Mod: PBBFAC,,, | Performed by: FAMILY MEDICINE

## 2019-12-16 PROCEDURE — 3008F PR BODY MASS INDEX (BMI) DOCUMENTED: ICD-10-PCS | Mod: CPTII,S$GLB,, | Performed by: FAMILY MEDICINE

## 2019-12-16 PROCEDURE — 3008F BODY MASS INDEX DOCD: CPT | Mod: CPTII,S$GLB,, | Performed by: FAMILY MEDICINE

## 2019-12-16 PROCEDURE — 99213 PR OFFICE/OUTPT VISIT, EST, LEVL III, 20-29 MIN: ICD-10-PCS | Mod: S$GLB,,, | Performed by: FAMILY MEDICINE

## 2019-12-16 PROCEDURE — 84270 ASSAY OF SEX HORMONE GLOBUL: CPT

## 2019-12-16 PROCEDURE — 85025 COMPLETE CBC W/AUTO DIFF WBC: CPT

## 2019-12-16 PROCEDURE — 99213 OFFICE O/P EST LOW 20 MIN: CPT | Mod: S$GLB,,, | Performed by: FAMILY MEDICINE

## 2019-12-16 PROCEDURE — 99999 PR PBB SHADOW E&M-EST. PATIENT-LVL III: CPT | Mod: PBBFAC,,, | Performed by: FAMILY MEDICINE

## 2019-12-16 PROCEDURE — 36415 COLL VENOUS BLD VENIPUNCTURE: CPT | Mod: PO

## 2019-12-16 RX ORDER — HYDROXYZINE HYDROCHLORIDE 25 MG/1
25 TABLET, FILM COATED ORAL 3 TIMES DAILY PRN
Qty: 30 TABLET | Refills: 1 | Status: SHIPPED | OUTPATIENT
Start: 2019-12-16 | End: 2020-02-04 | Stop reason: SDUPTHER

## 2019-12-16 RX ORDER — DULOXETIN HYDROCHLORIDE 30 MG/1
30 CAPSULE, DELAYED RELEASE ORAL DAILY
Qty: 30 CAPSULE | Refills: 11 | Status: SHIPPED | OUTPATIENT
Start: 2019-12-16 | End: 2020-01-06

## 2019-12-16 NOTE — LETTER
December 16, 2019    Rahul Farrar  [id number]  [place] [address]         [facility]  [address]  [place] [address]  Phone: [phone]  Fax: [phone] December 16, 2019     Patient: Rahul Farrar   YOB: 1985   Date of Visit: 12/16/2019       To Whom It May Concern:     Rahul Farrar was seen in our clinic today. Please excuse him for 12/15/19 - 12/16/19 and may return to work on 12/18/2019.    If you have any questions or concerns, please don't hesitate to call.    Sincerely,        Montserrat Drake LPN

## 2019-12-16 NOTE — PROGRESS NOTES
Subjective:      Patient ID: Rahul Farrar is a 34 y.o. male.    Chief Complaint: Anxiety and Stress      Patient reports continued anxiety, felt that he was about to have a panic attack yesterday so left work early and did not go in today.  Still feeling overwhelmed.  Says the BuSpar has not helped at all.  He is not wanting to take something long-term only as needed because he thinks his situation will improve in a few months.    Review of Systems   Constitutional: Positive for activity change. Negative for unexpected weight change.   HENT: Negative for hearing loss, rhinorrhea and trouble swallowing.    Eyes: Negative for discharge and visual disturbance.   Respiratory: Positive for chest tightness. Negative for wheezing.    Cardiovascular: Positive for palpitations. Negative for chest pain.   Gastrointestinal: Negative for blood in stool, constipation, diarrhea and vomiting.   Endocrine: Negative for polydipsia and polyuria.   Genitourinary: Negative for difficulty urinating, hematuria and urgency.   Musculoskeletal: Negative for arthralgias, joint swelling and neck pain.   Neurological: Negative for weakness and headaches.   Psychiatric/Behavioral: Negative for confusion and dysphoric mood.     History reviewed. No pertinent past medical history.       Past Surgical History:   Procedure Laterality Date    FOREARM FRACTURE SURGERY      NOSE SURGERY      SINUS SURGERY      TYMPANOSTOMY TUBE PLACEMENT       Family History   Problem Relation Age of Onset    Diabetes Maternal Grandfather     Heart disease Maternal Grandfather      Social History     Socioeconomic History    Marital status: Significant Other     Spouse name: Not on file    Number of children: Not on file    Years of education: Not on file    Highest education level: Not on file   Occupational History    Occupation: operation   Social Needs    Financial resource strain: Somewhat hard    Food insecurity:     Worry: Never true      "Inability: Never true    Transportation needs:     Medical: No     Non-medical: No   Tobacco Use    Smoking status: Never Smoker    Smokeless tobacco: Current User     Types: Chew   Substance and Sexual Activity    Alcohol use: Yes     Frequency: 2-4 times a month     Drinks per session: 3 or 4     Binge frequency: Less than monthly    Drug use: No    Sexual activity: Yes     Partners: Female   Lifestyle    Physical activity:     Days per week: 5 days     Minutes per session: 0 min    Stress: Very much   Relationships    Social connections:     Talks on phone: Twice a week     Gets together: Once a week     Attends Temple service: Not on file     Active member of club or organization: Yes     Attends meetings of clubs or organizations: 1 to 4 times per year     Relationship status: Living with partner   Other Topics Concern    Not on file   Social History Narrative    Not on file     Review of patient's allergies indicates:  No Known Allergies    Objective:       /78 (BP Location: Right arm, Patient Position: Sitting, BP Method: Large (Manual))   Pulse 90   Temp 98.7 °F (37.1 °C) (Tympanic)   Ht 5' 10" (1.778 m)   Wt 96.7 kg (213 lb 3 oz)   SpO2 99%   BMI 30.59 kg/m²   Physical Exam   Constitutional: He appears well-developed and well-nourished. No distress.   Skin: He is not diaphoretic.   Psychiatric: He has a normal mood and affect. His speech is normal and behavior is normal. Judgment and thought content normal. Cognition and memory are normal.   Vitals reviewed.    Assessment:     1. Fatigue, unspecified type    2. WENDIE (generalized anxiety disorder)      Plan:   Fatigue, unspecified type    WENDIE (generalized anxiety disorder)    Other orders  -     DULoxetine (CYMBALTA) 30 MG capsule; Take 1 capsule (30 mg total) by mouth once daily.  Dispense: 30 capsule; Refill: 11  -     hydrOXYzine HCl (ATARAX) 25 MG tablet; Take 1 tablet (25 mg total) by mouth 3 (three) times daily as needed for " Anxiety.  Dispense: 30 tablet; Refill: 1     Discussed with patient that I feel he needs to be on a long-term preventative medication to help prevent the panic attacks.  He will think about it and decide if he wants to start the Cymbalta  Medication List with Changes/Refills   New Medications    DULOXETINE (CYMBALTA) 30 MG CAPSULE    Take 1 capsule (30 mg total) by mouth once daily.    HYDROXYZINE HCL (ATARAX) 25 MG TABLET    Take 1 tablet (25 mg total) by mouth 3 (three) times daily as needed for Anxiety.   Current Medications    FUROSEMIDE (LASIX) 20 MG TABLET    Take 1 tablet (20 mg total) by mouth daily as needed.    MULTIVITAMIN (ONE DAILY MULTIVITAMIN) PER TABLET    Take 1 tablet by mouth.   Discontinued Medications    BUSPIRONE (BUSPAR) 10 MG TABLET    Take 1 tablet (10 mg total) by mouth 3 (three) times daily.

## 2019-12-20 LAB
ALBUMIN SERPL-MCNC: 4.2 G/DL (ref 3.6–5.1)
SHBG SERPL-SCNC: 16 NMOL/L (ref 10–50)
TESTOST FREE SERPL-MCNC: 61.3 PG/ML (ref 46–224)
TESTOST SERPL-MCNC: 280 NG/DL (ref 250–1100)
TESTOSTERONE.FREE+WB SERPL-MCNC: 118.2 NG/DL (ref 110–575)

## 2019-12-21 ENCOUNTER — PATIENT MESSAGE (OUTPATIENT)
Dept: INTERNAL MEDICINE | Facility: CLINIC | Age: 34
End: 2019-12-21

## 2019-12-23 NOTE — TELEPHONE ENCOUNTER
Pt also concern about bilirubin (1.1mg), Anion Gap (7mmol), and Alkaline Phosphatase (44U). All drawn 10/30/19

## 2019-12-23 NOTE — TELEPHONE ENCOUNTER
Pt request to have his testosterone panel (280ng/dL) completed for next visit. Last appt and last lab 12/16/19

## 2020-01-06 ENCOUNTER — OFFICE VISIT (OUTPATIENT)
Dept: INTERNAL MEDICINE | Facility: CLINIC | Age: 35
End: 2020-01-06
Payer: COMMERCIAL

## 2020-01-06 VITALS
HEART RATE: 71 BPM | SYSTOLIC BLOOD PRESSURE: 122 MMHG | TEMPERATURE: 98 F | DIASTOLIC BLOOD PRESSURE: 78 MMHG | BODY MASS INDEX: 30.99 KG/M2 | HEIGHT: 70 IN | OXYGEN SATURATION: 99 % | WEIGHT: 216.5 LBS

## 2020-01-06 DIAGNOSIS — F41.9 ANXIETY: ICD-10-CM

## 2020-01-06 DIAGNOSIS — M72.2 PLANTAR FASCIITIS: Primary | ICD-10-CM

## 2020-01-06 PROCEDURE — 3008F BODY MASS INDEX DOCD: CPT | Mod: CPTII,S$GLB,, | Performed by: NURSE PRACTITIONER

## 2020-01-06 PROCEDURE — 99214 OFFICE O/P EST MOD 30 MIN: CPT | Mod: S$GLB,,, | Performed by: NURSE PRACTITIONER

## 2020-01-06 PROCEDURE — 99214 PR OFFICE/OUTPT VISIT, EST, LEVL IV, 30-39 MIN: ICD-10-PCS | Mod: S$GLB,,, | Performed by: NURSE PRACTITIONER

## 2020-01-06 PROCEDURE — 99999 PR PBB SHADOW E&M-EST. PATIENT-LVL III: CPT | Mod: PBBFAC,,, | Performed by: NURSE PRACTITIONER

## 2020-01-06 PROCEDURE — 99999 PR PBB SHADOW E&M-EST. PATIENT-LVL III: ICD-10-PCS | Mod: PBBFAC,,, | Performed by: NURSE PRACTITIONER

## 2020-01-06 PROCEDURE — 3008F PR BODY MASS INDEX (BMI) DOCUMENTED: ICD-10-PCS | Mod: CPTII,S$GLB,, | Performed by: NURSE PRACTITIONER

## 2020-01-06 RX ORDER — IBUPROFEN 800 MG/1
800 TABLET ORAL 3 TIMES DAILY PRN
Qty: 60 TABLET | Refills: 0 | Status: SHIPPED | OUTPATIENT
Start: 2020-01-06 | End: 2020-09-10

## 2020-01-06 RX ORDER — DULOXETIN HYDROCHLORIDE 30 MG/1
30 CAPSULE, DELAYED RELEASE ORAL DAILY
Qty: 30 CAPSULE | Refills: 11
Start: 2020-01-06 | End: 2020-03-06

## 2020-01-07 ENCOUNTER — PATIENT MESSAGE (OUTPATIENT)
Dept: INTERNAL MEDICINE | Facility: CLINIC | Age: 35
End: 2020-01-07

## 2020-01-07 PROBLEM — F41.9 ANXIETY: Status: ACTIVE | Noted: 2020-01-07

## 2020-01-07 NOTE — PROGRESS NOTES
"Subjective:      Patient ID: Rahul Farrar is a 34 y.o. male.    Chief Complaint: Foot Pain (left )    HPI:  Patient states he was seen by podiatry for hardened areas to his feet.  He was told it was calloused areas, given a cream.  He says it was not helpful.  Now he has pain to the left heel that exacerbates as the day goes on. He has to wear boots to work, had to stop working one particular day due to the pain.  He looked up plantar fascitis on line and has been icing his foot, stretching, and taking NSAIDs with little benefit.  He also says he is having a lot of anxiety, states has panic attacks at times due to personal stressors.  He was prescribed cymbalta by his pcp and atarax.  He has not started the cymbalta, doesn't want to take anything daily.  The atarax makes him sleepy.  Says the buspar was not helpful.      History reviewed. No pertinent past medical history.    Past Surgical History:   Procedure Laterality Date    FOREARM FRACTURE SURGERY      NOSE SURGERY      SINUS SURGERY      TYMPANOSTOMY TUBE PLACEMENT         Lab Results   Component Value Date    WBC 5.53 12/16/2019    HGB 14.8 12/16/2019    HCT 42.6 12/16/2019     12/16/2019    CHOL 157 10/30/2019    TRIG 60 10/30/2019    HDL 59 10/30/2019    ALT 25 10/30/2019    AST 21 10/30/2019     10/30/2019    K 4.3 10/30/2019     10/30/2019    CREATININE 0.9 10/30/2019    BUN 11 10/30/2019    CO2 27 10/30/2019    TSH 1.087 10/30/2019       /78 (BP Location: Right arm, Patient Position: Sitting, BP Method: Large (Manual))   Pulse 71   Temp 98.4 °F (36.9 °C) (Tympanic)   Ht 5' 10" (1.778 m)   Wt 98.2 kg (216 lb 7.9 oz)   SpO2 99%   BMI 31.06 kg/m²       Review of Systems   Constitutional: Negative for appetite change, chills, diaphoresis and fever.   HENT: Negative for congestion, ear pain, postnasal drip, rhinorrhea, sneezing, sore throat and trouble swallowing.    Eyes: Negative for photophobia, pain and visual " disturbance.   Respiratory: Negative for apnea, cough, choking, chest tightness, shortness of breath and wheezing.    Cardiovascular: Negative for chest pain, palpitations and leg swelling.   Gastrointestinal: Negative for abdominal pain, constipation, diarrhea, nausea and vomiting.   Genitourinary: Negative for decreased urine volume, difficulty urinating, dysuria, hematuria and urgency.   Musculoskeletal: Positive for arthralgias and myalgias. Negative for gait problem and joint swelling.   Skin: Negative for rash.   Neurological: Negative for dizziness, tremors, seizures, syncope, weakness, light-headedness, numbness and headaches.   Psychiatric/Behavioral: Negative for agitation, confusion, decreased concentration, hallucinations and sleep disturbance. The patient is not nervous/anxious.       Objective:     Physical Exam   Constitutional: He is oriented to person, place, and time. He appears well-developed and well-nourished. No distress.   Musculoskeletal:   Left foot callous still present but smaller, was shaved down.  His arch area is tight, ttp to heel area   Neurological: He is alert and oriented to person, place, and time.   Skin: Skin is warm and dry.   Psychiatric: He has a normal mood and affect. His behavior is normal.     Assessment:      1. Plantar fasciitis    2. Anxiety      Plan:   Plantar fasciitis  -     Ambulatory Referral to Podiatry    Anxiety    Other orders  -     ibuprofen (ADVIL,MOTRIN) 800 MG tablet; Take 1 tablet (800 mg total) by mouth 3 (three) times daily as needed for Pain.  Dispense: 60 tablet; Refill: 0  -     DULoxetine (CYMBALTA) 30 MG capsule; Take 1 capsule (30 mg total) by mouth once daily.  Dispense: 30 capsule; Refill: 11    will see podiatry. Doesn't want to start the cymbalta.  Will try taking the buspar 1.5 tabs bid to see if this is helpful and follow up with his pcp      Current Outpatient Medications:     DULoxetine (CYMBALTA) 30 MG capsule, Take 1 capsule (30 mg  total) by mouth once daily., Disp: 30 capsule, Rfl: 11    hydrOXYzine HCl (ATARAX) 25 MG tablet, Take 1 tablet (25 mg total) by mouth 3 (three) times daily as needed for Anxiety., Disp: 30 tablet, Rfl: 1    ibuprofen (ADVIL,MOTRIN) 800 MG tablet, Take 1 tablet (800 mg total) by mouth 3 (three) times daily as needed for Pain., Disp: 60 tablet, Rfl: 0    multivitamin (ONE DAILY MULTIVITAMIN) per tablet, Take 1 tablet by mouth., Disp: , Rfl:

## 2020-01-08 ENCOUNTER — OFFICE VISIT (OUTPATIENT)
Dept: PODIATRY | Facility: CLINIC | Age: 35
End: 2020-01-08
Payer: COMMERCIAL

## 2020-01-08 VITALS
HEART RATE: 72 BPM | DIASTOLIC BLOOD PRESSURE: 84 MMHG | SYSTOLIC BLOOD PRESSURE: 126 MMHG | BODY MASS INDEX: 30.39 KG/M2 | HEIGHT: 70 IN | WEIGHT: 212.31 LBS

## 2020-01-08 DIAGNOSIS — L85.1 ACQUIRED PLANTAR POROKERATOSIS: ICD-10-CM

## 2020-01-08 DIAGNOSIS — M72.2 PLANTAR FASCIITIS: Primary | ICD-10-CM

## 2020-01-08 PROCEDURE — 99999 PR PBB SHADOW E&M-EST. PATIENT-LVL III: CPT | Mod: PBBFAC,,, | Performed by: PODIATRIST

## 2020-01-08 PROCEDURE — 99999 PR PBB SHADOW E&M-EST. PATIENT-LVL III: ICD-10-PCS | Mod: PBBFAC,,, | Performed by: PODIATRIST

## 2020-01-08 PROCEDURE — 3008F PR BODY MASS INDEX (BMI) DOCUMENTED: ICD-10-PCS | Mod: CPTII,S$GLB,, | Performed by: PODIATRIST

## 2020-01-08 PROCEDURE — 3008F BODY MASS INDEX DOCD: CPT | Mod: CPTII,S$GLB,, | Performed by: PODIATRIST

## 2020-01-08 PROCEDURE — 99213 OFFICE O/P EST LOW 20 MIN: CPT | Mod: 25,S$GLB,, | Performed by: PODIATRIST

## 2020-01-08 PROCEDURE — 20550 NJX 1 TENDON SHEATH/LIGAMENT: CPT | Mod: LT,S$GLB,, | Performed by: PODIATRIST

## 2020-01-08 PROCEDURE — 99213 PR OFFICE/OUTPT VISIT, EST, LEVL III, 20-29 MIN: ICD-10-PCS | Mod: 25,S$GLB,, | Performed by: PODIATRIST

## 2020-01-08 PROCEDURE — 20550: ICD-10-PCS | Mod: LT,S$GLB,, | Performed by: PODIATRIST

## 2020-01-08 RX ORDER — TRIAMCINOLONE ACETONIDE 40 MG/ML
40 INJECTION, SUSPENSION INTRA-ARTICULAR; INTRAMUSCULAR
Status: DISCONTINUED | OUTPATIENT
Start: 2020-01-08 | End: 2020-01-08 | Stop reason: HOSPADM

## 2020-01-08 RX ORDER — DEXAMETHASONE SODIUM PHOSPHATE 4 MG/ML
4 INJECTION, SOLUTION INTRA-ARTICULAR; INTRALESIONAL; INTRAMUSCULAR; INTRAVENOUS; SOFT TISSUE
Status: DISCONTINUED | OUTPATIENT
Start: 2020-01-08 | End: 2020-01-08 | Stop reason: HOSPADM

## 2020-01-08 RX ADMIN — DEXAMETHASONE SODIUM PHOSPHATE 4 MG: 4 INJECTION, SOLUTION INTRA-ARTICULAR; INTRALESIONAL; INTRAMUSCULAR; INTRAVENOUS; SOFT TISSUE at 04:01

## 2020-01-08 RX ADMIN — TRIAMCINOLONE ACETONIDE 40 MG: 40 INJECTION, SUSPENSION INTRA-ARTICULAR; INTRAMUSCULAR at 04:01

## 2020-01-08 NOTE — LETTER
January 8, 2020      Sukhjinder Serrato, SUDHIR  [address] [facility] [address]           [facility]  [address] [place] [id number]  Phone: [phone]  Fax: [phone]          Patient: Rahul Farrar   MR Number: [id number]   YOB: 1985   Date of Visit: 1/8/2020       Dear Sukhjinder Serrato:    Thank you for referring Rahul Farrar to me for evaluation. Attached you will find relevant portions of my assessment and plan of care.    If you have questions, please do not hesitate to call me. I look forward to following Rahul Farrar along with you.    Sincerely,    Shaista Pyle, SIXTO    Enclosure  CC:  No Recipients    If you would like to receive this communication electronically, please contact [email or web address] or [phone] to request more information on [employer] access.    For providers and/or their staff who would like to refer a patient to [facility], please contact us through our one-stop-shop provider referral line, Chung Bernstein, at [phone].    If you feel you have received this communication in error or would no longer like to receive these types of communications, please e-mail [email or web address]

## 2020-01-08 NOTE — PROCEDURES
Tendon Sheath left plantar fascia injection  Date/Time: 1/8/2020 4:00 PM  Performed by: Shaista Pyle DPM  Authorized by: Shaista Pyle DPM     Consent Done?:  Yes (Verbal)  Timeout: prior to procedure the correct patient, procedure, and site was verified    Indications:  Pain  Site marked: the procedure site was marked    Timeout: prior to procedure the correct patient, procedure, and site was verified    Location:  Foot  Foot joint: Left plantar fascia.  Prep: patient was prepped and draped in usual sterile fashion    Needle size:  25 G  Approach:  Medial  Medications:  4 mg dexamethasone 4 mg/mL; 40 mg triamcinolone acetonide 40 mg/mL  Patient tolerance:  Patient tolerated the procedure well with no immediate complications

## 2020-01-08 NOTE — PROGRESS NOTES
Subjective:       Patient ID: Rahul Farrar is a 34 y.o. male.    Chief Complaint: Foot Pain (Pt c/o severe pain in the plnater aspect of left foot. Pt states pain 8/10. Non diabetic pt. Wears tennis shoes w/ socks. PCP Dr Long.) and Callouses (Pt also c/o painful callouses underneath left foot.)      HPI: Rahul Farrar complains of severe pains to the left foot. States that this pain has been present for approximately 2 months. States pains are sharp and stabbing-like in nature. Pains are to the plantar foot, mostly with walking and standing. Rates the pains at approx. 8/10. States post-static dyskinesia. Denies any recent identifiable trauma. Does limp with gait. Reports that he did purchased Dr. Cuenca over-the-counter orthotics which did not provide relief.  No NSAID medications thus far but was recently prescribed by his PCP. Pains have been present for the past several months and the pains have worsened over the past couple of weeks.  He does have a history of plantar fasciitis. States walking and standing causes and/or exacerbates the symptoms. Patient has not had a corticosteroid injection(s) in the past Patient's Primary Care Provider is Nain Long MD.     Review of patient's allergies indicates:  No Known Allergies    History reviewed. No pertinent past medical history.    Family History   Problem Relation Age of Onset    Diabetes Maternal Grandfather     Heart disease Maternal Grandfather        Social History     Socioeconomic History    Marital status: Significant Other     Spouse name: Not on file    Number of children: Not on file    Years of education: Not on file    Highest education level: Not on file   Occupational History    Occupation: operation   Social Needs    Financial resource strain: Somewhat hard    Food insecurity:     Worry: Never true     Inability: Never true    Transportation needs:     Medical: No     Non-medical: No   Tobacco Use    Smoking  "status: Never Smoker    Smokeless tobacco: Current User     Types: Chew   Substance and Sexual Activity    Alcohol use: Yes     Frequency: 2-4 times a month     Drinks per session: 3 or 4     Binge frequency: Less than monthly    Drug use: No    Sexual activity: Yes     Partners: Female   Lifestyle    Physical activity:     Days per week: 5 days     Minutes per session: 0 min    Stress: Very much   Relationships    Social connections:     Talks on phone: Twice a week     Gets together: Once a week     Attends Restoration service: Not on file     Active member of club or organization: Yes     Attends meetings of clubs or organizations: 1 to 4 times per year     Relationship status: Living with partner   Other Topics Concern    Not on file   Social History Narrative    Not on file       Past Surgical History:   Procedure Laterality Date    FOREARM FRACTURE SURGERY      NOSE SURGERY      SINUS SURGERY      TYMPANOSTOMY TUBE PLACEMENT         Review of Systems   Constitutional: Negative for activity change, appetite change, chills and fever.   HENT: Negative for sinus pain, sore throat and voice change.    Eyes: Negative for pain, redness and visual disturbance.   Respiratory: Negative for cough and shortness of breath.    Cardiovascular: Negative for chest pain and palpitations.   Gastrointestinal: Negative for diarrhea, nausea and vomiting.   Musculoskeletal: Negative for back pain and joint swelling.   Skin: Negative for color change and wound.   Neurological: Negative for dizziness, weakness and numbness.   Psychiatric/Behavioral: The patient is not nervous/anxious.          Objective:   /84   Pulse 72   Ht 5' 10" (1.778 m)   Wt 96.3 kg (212 lb 4.9 oz)   BMI 30.46 kg/m²     CT Head Without Contrast  Narrative: CT of the head without contrast.    Clinical indication: weakness.Dizziness    Standard noncontrast CT scan of the brain. No previous for comparison.    The brain and ventricles are of " normal appearance.  No hemorrhage, mass lesion, or hydrocephalus.   No depressed skull fracture.  Impression:  Normal noncontrast CT scan of the brain.     Electronically signed by: KAISER LI III, MD  Date:     07/29/14  Time:    20:15       Physical Exam  LOWER EXTREMITY PHYSICAL EXAMINATION    VASCULAR: The right DP pulse is 2/4 and the left DP is 2/4. The right PT pulse is 2/4 and the left PT pulse is 2/4. Proximal to distal, warm to warm. No dependent rubor or elevation palor is noted. Capillary refill time is less than 3 seconds. Hair growth is appreciated to the dorsal foot and digits.    NEUROLOGY:  Protective sensation is intact with [place] Sena monofilament. Proprioception is intact. Intact sensation to light touch. No neurological symptoms noted on palpation of interspace with radiating sensations proximally or distally. Tinel's and Valliex's sign is negative. No neurological symptoms with compression of metatarsal heads. No numbness or tingling reported on examination.     ORTHOPEDIC:  Severe tenderness to palpation of the area around the plantar medial calcaneal tubercle on the left foot. Pains are characterized as sharp and stabbing-like with direct palpation of the area. There is also moderate pain to palpation of the immediate plantar aspect of the heel, and no pains to the lateral band of the fascia. No edema is noted. No fullness is noted. No pains or defects are noted within the plantar fascia at the arch. No plantar fibromas are noted. No defects are noted within the ligament. Dorsiflexion of the MTPJs with simultaneous palpation of the fascia at the arch, does worsen and exacerbate the pains. No pains with medial to lateral compression of the heel. Equinus contracture is noted. Antalgic gait pattern is noted.     DERMATOLOGY: No ecchymosis is noted.  Skin is supple, dry and intact. Skin is supple.  No hyperkeratosis noted. Multiple porokeratosis present to the left foot.  No open  wounds or ulcerations are noted.  No palpable plantar fibromas noted.    Assessment:     1. Plantar fasciitis - Left Foot    2. Acquired plantar porokeratosis          Plan:     Plantar fasciitis - Left Foot   explained to the patient that etiology and all treatment options for heel pain including rest,  ice messages, stretching exercises, strappings/tappings, NSAID's, injections, new shoegear with orthotic inserts, and/or surgical treatment. I also discussed a possible injection of steroid to help calm down the inflammation sooner. I expressed the importance of wearing the orthotics in culmination of other treatment modalities. Patient agreed to stretching exercises and inserts. I gave written and verbal instructions on heel cord stretching and this was demonstrated for the patient. Patient expressed understanding and had the patient teach back the instructions.  Patient instructed on adequate icing techniques which should be done for acute pain and inflammation.  Patient was prescribed anti-inflammatories by his PCP, but has yet to  the prescription.  I recommend that he does take these to help his current plantar fasciitis.    We discussed the importance of wearing the orthotics to help elevate the arch which will allow for tension to be lessened to the plantar fascia and posterior heel cord.  Discussed the importance of the break-in period with the inserts by wearing them 2-3 hours for the 1st day and increasing their use an hour each day until able to tolerate a full work period.    Following cleansing the area with alcohol/Betadine paint.  An injection was inserted place at the area of maximum pain at the medial aspect of the left heel at the insertion of plantar fascia as identified in the physical exam using a 25 gauge needle.  The injection consisted of 0.5 cc of dexamethasone sodium phosphate, 0.5 cc of triamcinolone acetate, and 1 cc of 0.75% Marcaine plain.  The patient tolerated procedure well  without complications.  Patient was educated on the pathology of the current etiology.  We discussed with the patient that he may have increased pain, secondary to the injection/steroid flare, over the next couple days.    Acquired plantar porokeratosis  Discussed pathology in etiology associated with porokeratosis.  With sterile 15 blade the centralize core was removed.  I discussed him the importance of keeping his feet clean and utilizing Vaseline to the plantar aspect of the feet to keep the areas moist and prevent further callusing and recurrence porokeratosis    Future Appointments   Date Time Provider Department Center   10/30/2020  8:00 AM Nain Long MD [facility]

## 2020-02-04 RX ORDER — HYDROXYZINE HYDROCHLORIDE 25 MG/1
25 TABLET, FILM COATED ORAL 3 TIMES DAILY PRN
Qty: 30 TABLET | Refills: 1 | Status: SHIPPED | OUTPATIENT
Start: 2020-02-04 | End: 2020-03-06

## 2020-03-05 ENCOUNTER — OFFICE VISIT (OUTPATIENT)
Dept: PODIATRY | Facility: CLINIC | Age: 35
End: 2020-03-05
Payer: COMMERCIAL

## 2020-03-05 VITALS
HEART RATE: 71 BPM | SYSTOLIC BLOOD PRESSURE: 113 MMHG | DIASTOLIC BLOOD PRESSURE: 71 MMHG | BODY MASS INDEX: 30.9 KG/M2 | HEIGHT: 70 IN | WEIGHT: 215.81 LBS

## 2020-03-05 DIAGNOSIS — M79.672 INTRACTABLE LEFT HEEL PAIN: ICD-10-CM

## 2020-03-05 DIAGNOSIS — M72.2 PLANTAR FASCIITIS: Primary | ICD-10-CM

## 2020-03-05 PROCEDURE — 3008F BODY MASS INDEX DOCD: CPT | Mod: CPTII,S$GLB,, | Performed by: PODIATRIST

## 2020-03-05 PROCEDURE — 20550: ICD-10-PCS | Mod: LT,S$GLB,, | Performed by: PODIATRIST

## 2020-03-05 PROCEDURE — 99999 PR PBB SHADOW E&M-EST. PATIENT-LVL III: ICD-10-PCS | Mod: PBBFAC,,, | Performed by: PODIATRIST

## 2020-03-05 PROCEDURE — 99213 PR OFFICE/OUTPT VISIT, EST, LEVL III, 20-29 MIN: ICD-10-PCS | Mod: 25,S$GLB,, | Performed by: PODIATRIST

## 2020-03-05 PROCEDURE — 20550 NJX 1 TENDON SHEATH/LIGAMENT: CPT | Mod: LT,S$GLB,, | Performed by: PODIATRIST

## 2020-03-05 PROCEDURE — 99213 OFFICE O/P EST LOW 20 MIN: CPT | Mod: 25,S$GLB,, | Performed by: PODIATRIST

## 2020-03-05 PROCEDURE — 99999 PR PBB SHADOW E&M-EST. PATIENT-LVL III: CPT | Mod: PBBFAC,,, | Performed by: PODIATRIST

## 2020-03-05 PROCEDURE — 3008F PR BODY MASS INDEX (BMI) DOCUMENTED: ICD-10-PCS | Mod: CPTII,S$GLB,, | Performed by: PODIATRIST

## 2020-03-05 RX ORDER — TRIAMCINOLONE ACETONIDE 40 MG/ML
40 INJECTION, SUSPENSION INTRA-ARTICULAR; INTRAMUSCULAR
Status: DISCONTINUED | OUTPATIENT
Start: 2020-03-05 | End: 2020-03-05 | Stop reason: HOSPADM

## 2020-03-05 RX ORDER — DEXAMETHASONE SODIUM PHOSPHATE 4 MG/ML
4 INJECTION, SOLUTION INTRA-ARTICULAR; INTRALESIONAL; INTRAMUSCULAR; INTRAVENOUS; SOFT TISSUE
Status: DISCONTINUED | OUTPATIENT
Start: 2020-03-05 | End: 2020-03-05 | Stop reason: HOSPADM

## 2020-03-05 RX ADMIN — DEXAMETHASONE SODIUM PHOSPHATE 4 MG: 4 INJECTION, SOLUTION INTRA-ARTICULAR; INTRALESIONAL; INTRAMUSCULAR; INTRAVENOUS; SOFT TISSUE at 03:03

## 2020-03-05 RX ADMIN — TRIAMCINOLONE ACETONIDE 40 MG: 40 INJECTION, SUSPENSION INTRA-ARTICULAR; INTRAMUSCULAR at 03:03

## 2020-03-05 NOTE — PROCEDURES
Tendon Sheath-left plantar fascia  Date/Time: 3/5/2020 3:00 PM  Performed by: Shaista Pyle DPM  Authorized by: Shaista Pyle DPM     Consent Done?:  Yes (Verbal)  Timeout: prior to procedure the correct patient, procedure, and site was verified    Indications:  Pain  Site marked: the procedure site was marked    Timeout: prior to procedure the correct patient, procedure, and site was verified    Location:  Foot  Foot joint: Left medial plantar fascia.  Prep: patient was prepped and draped in usual sterile fashion    Needle size:  25 G  Medications:  4 mg dexamethasone 4 mg/mL; 40 mg triamcinolone acetonide 40 mg/mL  Patient tolerance:  Patient tolerated the procedure well with no immediate complications

## 2020-03-05 NOTE — PROGRESS NOTES
Subjective:       Patient ID: Rahul Farrar is a 34 y.o. male.    Chief Complaint: Foot Pain (Pt c/o left foot pain in the plantar aspect of foot. Pt rates pain 7/10. Non diabetic pt. Wears tennis shoes w/ socks. PCP Dr Long.)      HPI: Rahul Farrar complains of recurrent plantar fasciitis to the left heel.  He states that after his initial injection, he felt great and started running 3-4 days later while out of his inserts.  He states he did purchase inserts but keeps them in his work boots.  Does not transfer them to his regular athletic shoes or workout shoes.  Does report continuing to utilize icing and stretching techniques.  States that he will need to purchase new inserts so he will able to wear them in his regular shoes as well. States pains are sharp and stabbing-like in nature. Pains are to the plantar foot, mostly with walking and standing. Rates the pains at approx. 7/10. States post-static dyskinesia.  States walking and standing causes and/or exacerbates the symptoms. Patient's Primary Care Provider is Nain Long MD.     Review of patient's allergies indicates:  No Known Allergies    History reviewed. No pertinent past medical history.    Family History   Problem Relation Age of Onset    Diabetes Maternal Grandfather     Heart disease Maternal Grandfather        Social History     Socioeconomic History    Marital status:      Spouse name: Not on file    Number of children: Not on file    Years of education: Not on file    Highest education level: Not on file   Occupational History    Occupation: operation   Social Needs    Financial resource strain: Somewhat hard    Food insecurity:     Worry: Never true     Inability: Never true    Transportation needs:     Medical: No     Non-medical: No   Tobacco Use    Smoking status: Never Smoker    Smokeless tobacco: Current User     Types: Chew   Substance and Sexual Activity    Alcohol use: Yes     Frequency: 2-4  "times a month     Drinks per session: 3 or 4     Binge frequency: Less than monthly    Drug use: No    Sexual activity: Yes     Partners: Female   Lifestyle    Physical activity:     Days per week: 5 days     Minutes per session: 0 min    Stress: Very much   Relationships    Social connections:     Talks on phone: Twice a week     Gets together: Once a week     Attends Yarsanism service: Not on file     Active member of club or organization: Yes     Attends meetings of clubs or organizations: 1 to 4 times per year     Relationship status: Living with partner   Other Topics Concern    Not on file   Social History Narrative    Not on file       Past Surgical History:   Procedure Laterality Date    FOREARM FRACTURE SURGERY      NOSE SURGERY      SINUS SURGERY      TYMPANOSTOMY TUBE PLACEMENT         Review of Systems   Constitutional: Negative for activity change, appetite change, chills and fever.   HENT: Negative for sinus pain, sore throat and voice change.    Eyes: Negative for pain, redness and visual disturbance.   Respiratory: Negative for cough and shortness of breath.    Cardiovascular: Negative for chest pain and palpitations.   Gastrointestinal: Negative for diarrhea, nausea and vomiting.   Musculoskeletal: Positive for gait problem (Left plantar foot pain). Negative for back pain and joint swelling.   Skin: Negative for color change and wound.   Neurological: Negative for dizziness, weakness and numbness.   Psychiatric/Behavioral: The patient is not nervous/anxious.          Objective:   /71   Pulse 71   Ht 5' 10" (1.778 m)   Wt 97.9 kg (215 lb 13.3 oz)   BMI 30.97 kg/m²     CT Head Without Contrast  Narrative: CT of the head without contrast.    Clinical indication: weakness.Dizziness    Standard noncontrast CT scan of the brain. No previous for comparison.    The brain and ventricles are of normal appearance.  No hemorrhage, mass lesion, or hydrocephalus.   No depressed skull " fracture.  Impression:  Normal noncontrast CT scan of the brain.     Electronically signed by: KAISER LI III, MD  Date:     07/29/14  Time:    20:15       Physical Exam  LOWER EXTREMITY PHYSICAL EXAMINATION    VASCULAR: The left DP is 2/4 and the left PT pulse is 2/4. Proximal to distal, warm to warm. No dependent rubor or elevation palor is noted. Capillary refill time is less than 3 seconds. Hair growth is appreciated to the dorsal foot and digits.    NEUROLOGY: Proprioception is intact, bilateral. Sensation to light touch is intact. Negative Tinel's Sign and negative Valleix Sign. No neurological sensations with compression of the area of Frausto's Nerve in the area of the Abductor Hallucis muscle belly.    ORTHOPEDIC:  Moderate to severe tenderness to palpation of the area around the plantar medial calcaneal tubercle on the left foot. Pains are characterized as sharp and stabbing-like with direct palpation of the area. There is also mild pain to palpation of the immediate plantar aspect of the heel, and no pains to the lateral band of the fascia. No edema is noted. No fullness is noted. No pains or defects are noted within the plantar fascia at the arch. No plantar fibromas are noted. No defects are noted within the ligament. Dorsiflexion of the MTPJs with simultaneous palpation of the fascia at the arch, does worsen and exacerbate the pains. No pains with medial to lateral compression of the heel. Equinus contracture is noted. Antalgic gait pattern is noted.     DERMATOLOGY: No ecchymosis is noted.  Skin is supple, dry and intact. Skin is supple.  No hyperkeratosis noted. No calluses.  No open wounds or ulcerations are noted.  No palpable plantar fibromas noted.    Assessment:     1. Plantar fasciitis - Left Foot    2. Intractable left heel pain - Left Foot          Plan:     Plantar fasciitis - Left Foot  -     Tendon Sheath-left plantar fascia    Intractable left heel pain - Left Foot  -     Tendon  Sheath-left plantar fascia     We continue to discuss treatment options for plantar fasciitis on left lower extremity.  I did discussed with the patient that is important to utilize the orthotics as recommended.  I encouraged patient to use the orthotics and all pairs of shoes while symptoms continue to occur.  Also encouraged patient to purchase a new pair of orthotics to place in his regular athletic shoe/workout shoes to make sure that he continues to support his medial longitudinal arch during these processes.  Patient states he switch back to his soft cushion orthotics which do not support his arch.  Also states that he did increase his activity and work out soon thereafter the last injection which he states was too early.    We continue discussed treatment options in regarding to plantar fasciitis.  We discussed repeat injection and also possibility of physical therapy in hopes that this could help stretch/loosen the plantar fascia and allow the patient to ambulate without pain discomfort.  Patient states that he would like any repeat injection as he has significant improvement of pain is symptoms at that time.  We discussed also that if he continues to have further increase in pain or recurrence symptoms, recommend physical therapy consultation to help with this pathology.  I discussed with the patient that if patient were continue to fell all conservative options, we would discuss obtaining an MRI to see if the band was thickened and needed to be surgically cut to allow for resolution of symptoms.    Following cleansing the area with alcohol/Betadine paint.  An injection was inserted place at the area of maximum pain at the medial aspect of the left heel at the insertion of the medial band of the plantar fascia at the glabrous junction of the skin inferior to the medial malleolus as identified in the physical exam using a 25 gauge needle.  The injection consisted of 1 cc of dexamethasone sodium phosphate, 1  cc of triamcinolone acetate, and 1 cc of 0.75% Marcaine plain.  The patient tolerated procedure well without complications.  Patient was educated on the pathology of the current etiology.  We discussed with the patient that he may have increased pain, secondary to the injection/steroid flare, over the next couple days.    A new prescription for new balance orthotics was given to the patient. I encouraged patient to continue to stretch and ice.  Also encouraged patient to repeat the break-in period for these inserts.    Future Appointments   Date Time Provider Department Center   10/30/2020  8:00 AM Nain Long MD [facility]

## 2020-03-06 ENCOUNTER — TELEPHONE (OUTPATIENT)
Dept: INTERNAL MEDICINE | Facility: CLINIC | Age: 35
End: 2020-03-06

## 2020-03-06 ENCOUNTER — OFFICE VISIT (OUTPATIENT)
Dept: INTERNAL MEDICINE | Facility: CLINIC | Age: 35
End: 2020-03-06
Payer: COMMERCIAL

## 2020-03-06 VITALS
OXYGEN SATURATION: 99 % | TEMPERATURE: 98 F | HEART RATE: 74 BPM | DIASTOLIC BLOOD PRESSURE: 80 MMHG | WEIGHT: 216.94 LBS | BODY MASS INDEX: 31.06 KG/M2 | HEIGHT: 70 IN | SYSTOLIC BLOOD PRESSURE: 118 MMHG

## 2020-03-06 DIAGNOSIS — J32.9 SINUSITIS, UNSPECIFIED CHRONICITY, UNSPECIFIED LOCATION: Primary | ICD-10-CM

## 2020-03-06 DIAGNOSIS — F41.9 ANXIETY: ICD-10-CM

## 2020-03-06 DIAGNOSIS — E29.1 HYPOGONADISM IN MALE: ICD-10-CM

## 2020-03-06 PROCEDURE — 99214 OFFICE O/P EST MOD 30 MIN: CPT | Mod: 25,S$GLB,, | Performed by: FAMILY MEDICINE

## 2020-03-06 PROCEDURE — 96372 THER/PROPH/DIAG INJ SC/IM: CPT | Mod: S$GLB,,, | Performed by: FAMILY MEDICINE

## 2020-03-06 PROCEDURE — 96372 PR INJECTION,THERAP/PROPH/DIAG2ST, IM OR SUBCUT: ICD-10-PCS | Mod: S$GLB,,, | Performed by: FAMILY MEDICINE

## 2020-03-06 PROCEDURE — 99999 PR PBB SHADOW E&M-EST. PATIENT-LVL III: CPT | Mod: PBBFAC,,, | Performed by: FAMILY MEDICINE

## 2020-03-06 PROCEDURE — 99999 PR PBB SHADOW E&M-EST. PATIENT-LVL III: ICD-10-PCS | Mod: PBBFAC,,, | Performed by: FAMILY MEDICINE

## 2020-03-06 PROCEDURE — 99214 PR OFFICE/OUTPT VISIT, EST, LEVL IV, 30-39 MIN: ICD-10-PCS | Mod: 25,S$GLB,, | Performed by: FAMILY MEDICINE

## 2020-03-06 PROCEDURE — 3008F BODY MASS INDEX DOCD: CPT | Mod: CPTII,S$GLB,, | Performed by: FAMILY MEDICINE

## 2020-03-06 PROCEDURE — 3008F PR BODY MASS INDEX (BMI) DOCUMENTED: ICD-10-PCS | Mod: CPTII,S$GLB,, | Performed by: FAMILY MEDICINE

## 2020-03-06 RX ORDER — BENZONATATE 200 MG/1
200 CAPSULE ORAL 3 TIMES DAILY PRN
Qty: 20 CAPSULE | Refills: 1 | Status: SHIPPED | OUTPATIENT
Start: 2020-03-06 | End: 2020-03-16

## 2020-03-06 RX ORDER — AZITHROMYCIN 250 MG/1
TABLET, FILM COATED ORAL
Qty: 6 TABLET | Refills: 0 | Status: SHIPPED | OUTPATIENT
Start: 2020-03-06 | End: 2020-03-11

## 2020-03-06 RX ORDER — METHYLPREDNISOLONE ACETATE 80 MG/ML
80 INJECTION, SUSPENSION INTRA-ARTICULAR; INTRALESIONAL; INTRAMUSCULAR; SOFT TISSUE ONCE
Status: COMPLETED | OUTPATIENT
Start: 2020-03-06 | End: 2020-03-06

## 2020-03-06 RX ORDER — ALPRAZOLAM 0.5 MG/1
0.5 TABLET ORAL 2 TIMES DAILY PRN
Qty: 30 TABLET | Refills: 0 | Status: SHIPPED | OUTPATIENT
Start: 2020-03-06 | End: 2020-04-07 | Stop reason: SDUPTHER

## 2020-03-06 RX ADMIN — METHYLPREDNISOLONE ACETATE 80 MG: 80 INJECTION, SUSPENSION INTRA-ARTICULAR; INTRALESIONAL; INTRAMUSCULAR; SOFT TISSUE at 05:03

## 2020-03-06 NOTE — PROGRESS NOTES
Pt given methylprednisolone acetate 80 mg/ ml IM left ventrogluteal. Pt advised to wait 15 minutes to observe for adverse reaction. Pt left before 15 minutes was up.

## 2020-03-06 NOTE — TELEPHONE ENCOUNTER
----- Message from Glenna Esparza sent at 3/6/2020  2:55 PM CST -----  Pt called to schedule an appt and asked for a call back with appt date and time.      .      Thanks      Pt contact # [phone]

## 2020-03-07 ENCOUNTER — LAB VISIT (OUTPATIENT)
Dept: LAB | Facility: HOSPITAL | Age: 35
End: 2020-03-07
Attending: FAMILY MEDICINE
Payer: COMMERCIAL

## 2020-03-07 DIAGNOSIS — E29.1 HYPOGONADISM IN MALE: ICD-10-CM

## 2020-03-07 PROCEDURE — 36415 COLL VENOUS BLD VENIPUNCTURE: CPT

## 2020-03-07 PROCEDURE — 82040 ASSAY OF SERUM ALBUMIN: CPT

## 2020-03-08 NOTE — PROGRESS NOTES
Subjective:      Patient ID: Rahul Farrar is a 34 y.o. male.    Chief Complaint: Cough      Patient reports congestion, coughing, sneezing, sore throat, sinus pressure and ear pressure.  No known fevers.  He has been taking Zyrtec without significant relief.  He also reports continued anxiety, reports he has been seeing a therapist regularly, did not find that  buspar or hydroxyzine help very much, also finds hydroxyzine makes him very sleepy and cannot stay awake.  He is also wanting to recheck his testosterone levels, they were checked several months ago but he had been taking a supplement that time has been off it for at least 3 months would like to see what his true levels are.    Review of Systems   Constitutional: Negative for activity change, appetite change, fatigue and fever.   HENT: Positive for congestion, postnasal drip, rhinorrhea, sinus pressure, sinus pain and sore throat. Negative for ear pain.    Respiratory: Positive for cough. Negative for shortness of breath and wheezing.    Gastrointestinal: Negative for abdominal pain, diarrhea and nausea.   Musculoskeletal: Negative for myalgias.   Skin: Negative for rash.   Neurological: Positive for headaches.   Psychiatric/Behavioral: Negative for sleep disturbance. The patient is nervous/anxious.      History reviewed. No pertinent past medical history.       Past Surgical History:   Procedure Laterality Date    FOREARM FRACTURE SURGERY      NOSE SURGERY      SINUS SURGERY      TYMPANOSTOMY TUBE PLACEMENT       Family History   Problem Relation Age of Onset    Diabetes Maternal Grandfather     Heart disease Maternal Grandfather      Social History     Socioeconomic History    Marital status:      Spouse name: Not on file    Number of children: Not on file    Years of education: Not on file    Highest education level: Not on file   Occupational History    Occupation: operation   Social Needs    Financial resource strain: Somewhat  "hard    Food insecurity:     Worry: Never true     Inability: Never true    Transportation needs:     Medical: No     Non-medical: No   Tobacco Use    Smoking status: Never Smoker    Smokeless tobacco: Current User     Types: Chew   Substance and Sexual Activity    Alcohol use: Yes     Frequency: 2-4 times a month     Drinks per session: 3 or 4     Binge frequency: Less than monthly    Drug use: No    Sexual activity: Yes     Partners: Female   Lifestyle    Physical activity:     Days per week: 5 days     Minutes per session: 0 min    Stress: Very much   Relationships    Social connections:     Talks on phone: Twice a week     Gets together: Once a week     Attends Alevism service: Not on file     Active member of club or organization: Yes     Attends meetings of clubs or organizations: 1 to 4 times per year     Relationship status: Living with partner   Other Topics Concern    Not on file   Social History Narrative    Not on file     Review of patient's allergies indicates:  No Known Allergies    Objective:       /80 (BP Location: Right arm)   Pulse 74   Temp 98.4 °F (36.9 °C) (Tympanic)   Ht 5' 10" (1.778 m)   Wt 98.4 kg (216 lb 14.9 oz)   SpO2 99%   BMI 31.13 kg/m²   Physical Exam   Constitutional: He appears well-developed and well-nourished. No distress.   HENT:   Head: Normocephalic.   Right Ear: Hearing, tympanic membrane, external ear and ear canal normal.   Left Ear: Hearing, tympanic membrane, external ear and ear canal normal.   Nose: Mucosal edema present. Right sinus exhibits maxillary sinus tenderness and frontal sinus tenderness. Left sinus exhibits maxillary sinus tenderness and frontal sinus tenderness.   Mouth/Throat: Uvula is midline and mucous membranes are normal. Posterior oropharyngeal erythema present.   Eyes: Pupils are equal, round, and reactive to light. Conjunctivae and EOM are normal.   Cardiovascular: Normal rate, regular rhythm and normal heart sounds. "   Pulmonary/Chest: Effort normal and breath sounds normal. No respiratory distress.   Lymphadenopathy:     He has no cervical adenopathy.   Skin: Skin is warm and dry. He is not diaphoretic.   Psychiatric: He has a normal mood and affect. His behavior is normal. Judgment and thought content normal.   Nursing note and vitals reviewed.    Assessment:     1. Sinusitis, unspecified chronicity, unspecified location    2. Hypogonadism in male    3. Anxiety      Plan:   Sinusitis, unspecified chronicity, unspecified location    Hypogonadism in male  -     Testosterone Panel; Future; Expected date: 06/04/2020    Anxiety    Other orders  -     methylPREDNISolone acetate injection 80 mg  -     benzonatate (TESSALON) 200 MG capsule; Take 1 capsule (200 mg total) by mouth 3 (three) times daily as needed.  Dispense: 20 capsule; Refill: 1  -     azithromycin (Z-LUCAS) 250 MG tablet; Take 2 tablets by mouth on day 1; Take 1 tablet by mouth on days 2-5  Dispense: 6 tablet; Refill: 0  -     ALPRAZolam (XANAX) 0.5 MG tablet; Take 1 tablet (0.5 mg total) by mouth 2 (two) times daily as needed for Anxiety.  Dispense: 30 tablet; Refill: 0     Will try Xanax to be taken only as needed, cautioned him about addictive potential.  Medication List with Changes/Refills   New Medications    ALPRAZOLAM (XANAX) 0.5 MG TABLET    Take 1 tablet (0.5 mg total) by mouth 2 (two) times daily as needed for Anxiety.    AZITHROMYCIN (Z-LUCAS) 250 MG TABLET    Take 2 tablets by mouth on day 1; Take 1 tablet by mouth on days 2-5    BENZONATATE (TESSALON) 200 MG CAPSULE    Take 1 capsule (200 mg total) by mouth 3 (three) times daily as needed.   Current Medications    IBUPROFEN (ADVIL,MOTRIN) 800 MG TABLET    Take 1 tablet (800 mg total) by mouth 3 (three) times daily as needed for Pain.    MULTIVITAMIN (ONE DAILY MULTIVITAMIN) PER TABLET    Take 1 tablet by mouth.   Discontinued Medications    DULOXETINE (CYMBALTA) 30 MG CAPSULE    Take 1 capsule (30 mg total)  by mouth once daily.    HYDROXYZINE HCL (ATARAX) 25 MG TABLET    Take 1 tablet (25 mg total) by mouth 3 (three) times daily as needed for Anxiety.

## 2020-03-10 ENCOUNTER — TELEPHONE (OUTPATIENT)
Dept: PODIATRY | Facility: CLINIC | Age: 35
End: 2020-03-10

## 2020-03-10 NOTE — TELEPHONE ENCOUNTER
Contacted pt regarding increased heel pain after steroid injection. Advised pt that he is likely experiencing steroid flare ups, and to begin icing, elevating and taking antiinflammatory medication as prescribed. Also advised pt to call back if condition worsens. Pt verbalized understanding and phone call ended pleasantly.              ----- Message from Lubna Lauren sent at 3/10/2020  2:42 PM CDT -----  Contact: patient  Type:  Needs Medical Advice    Who Called: patient  Symptoms (please be specific): more pain in heel where injection site is  How long has patient had these symptoms:  Since last visit  Pharmacy name and phone #:    [id number] - [place], [place] - [address] [facility]  [address]  [facility] [address]  Phone: [phone] Fax: [phone]    Would the patient rather a call back or a response via [facility]? Call  Best Call Back Number: [phone]  Additional Information: please call patient today ASAP Urgent!!

## 2020-03-12 LAB
ALBUMIN SERPL-MCNC: 4.4 G/DL (ref 3.6–5.1)
SHBG SERPL-SCNC: 17 NMOL/L (ref 10–50)
TESTOST FREE SERPL-MCNC: 60.7 PG/ML (ref 46–224)
TESTOST SERPL-MCNC: 293 NG/DL (ref 250–1100)
TESTOSTERONE.FREE+WB SERPL-MCNC: 122.1 NG/DL (ref 110–575)

## 2020-03-23 ENCOUNTER — NURSE TRIAGE (OUTPATIENT)
Dept: ADMINISTRATIVE | Facility: CLINIC | Age: 35
End: 2020-03-23

## 2020-03-23 ENCOUNTER — PATIENT MESSAGE (OUTPATIENT)
Dept: INTERNAL MEDICINE | Facility: CLINIC | Age: 35
End: 2020-03-23

## 2020-03-23 RX ORDER — ALPRAZOLAM 0.5 MG/1
0.5 TABLET ORAL 2 TIMES DAILY PRN
Qty: 30 TABLET | Refills: 0 | OUTPATIENT
Start: 2020-03-23 | End: 2020-04-22

## 2020-03-23 NOTE — TELEPHONE ENCOUNTER
Reason for Disposition   General information question, no triage required and triager able to answer question    Protocols used: INFORMATION ONLY CALL-A-AH    Pt called COVID-19/OOC line. Stated he has been in contact with a person who is in hospital with COVID-19 and he wants to be tested. Pt stated he has been having a dry cough 3 weeks with no improvement from OTC medication and steroid injection. Pt advised to discuss with a Health Care Provider at [facility] virtual visit. Pt accepted virtual visit information, and verbalized understanding of instructions.

## 2020-03-24 RX ORDER — HYDROXYZINE HYDROCHLORIDE 25 MG/1
25 TABLET, FILM COATED ORAL 3 TIMES DAILY PRN
Qty: 60 TABLET | Refills: 3 | Status: SHIPPED | OUTPATIENT
Start: 2020-03-24 | End: 2020-11-03 | Stop reason: SDUPTHER

## 2020-03-26 DIAGNOSIS — R19.7 DIARRHEA, UNSPECIFIED TYPE: ICD-10-CM

## 2020-03-26 DIAGNOSIS — Z20.822 EXPOSURE TO COVID-19 VIRUS: ICD-10-CM

## 2020-03-26 DIAGNOSIS — R05.9 COUGH: Primary | ICD-10-CM

## 2020-04-07 RX ORDER — ALPRAZOLAM 0.5 MG/1
0.5 TABLET ORAL 2 TIMES DAILY PRN
Qty: 20 TABLET | Refills: 5 | Status: SHIPPED | OUTPATIENT
Start: 2020-04-07 | End: 2020-07-15 | Stop reason: SDUPTHER

## 2020-05-01 RX ORDER — ALPRAZOLAM 0.5 MG/1
0.5 TABLET ORAL 2 TIMES DAILY PRN
Qty: 20 TABLET | Refills: 5 | OUTPATIENT
Start: 2020-05-01 | End: 2020-05-31

## 2020-07-15 ENCOUNTER — OFFICE VISIT (OUTPATIENT)
Dept: INTERNAL MEDICINE | Facility: CLINIC | Age: 35
End: 2020-07-15
Payer: COMMERCIAL

## 2020-07-15 DIAGNOSIS — F41.9 ANXIETY: Primary | ICD-10-CM

## 2020-07-15 PROCEDURE — 99213 PR OFFICE/OUTPT VISIT, EST, LEVL III, 20-29 MIN: ICD-10-PCS | Mod: 95,,, | Performed by: FAMILY MEDICINE

## 2020-07-15 PROCEDURE — 99213 OFFICE O/P EST LOW 20 MIN: CPT | Mod: 95,,, | Performed by: FAMILY MEDICINE

## 2020-07-15 RX ORDER — ALPRAZOLAM 0.5 MG/1
0.5 TABLET ORAL 2 TIMES DAILY PRN
Qty: 20 TABLET | Refills: 2 | Status: SHIPPED | OUTPATIENT
Start: 2020-07-15 | End: 2020-08-13 | Stop reason: SDUPTHER

## 2020-07-15 NOTE — LETTER
[facility]  [address] [address]  Phone: [phone]  Fax: [phone]           I put in your referral to psychiatry, please call the department at [phone] to schedule your appointment.

## 2020-07-15 NOTE — PATIENT INSTRUCTIONS
I put in your referral to psychiatry, please call the department at [phone] to schedule your appointment.

## 2020-07-15 NOTE — PROGRESS NOTES
The patient location is: his job  The chief complaint leading to consultation is: follow up    Visit type: audiovisual    Face to Face time with patient: 8 minutes  9 minutes of total time spent on the encounter, which includes face to face time and non-face to face time preparing to see the patient (eg, review of tests), Obtaining and/or reviewing separately obtained history, Documenting clinical information in the electronic or other health record, Independently interpreting results (not separately reported) and communicating results to the patient/family/caregiver, or Care coordination (not separately reported).         Each patient to whom he or she provides medical services by telemedicine is:  (1) informed of the relationship between the physician and patient and the respective role of any other health care provider with respect to management of the patient; and (2) notified that he or she may decline to receive medical services by telemedicine and may withdraw from such care at any time.    Notes:     Subjective:      Patient ID: Rahul Farrar is a 34 y.o. male.    Chief Complaint: No chief complaint on file.      Patient here today for follow-up on his anxiety.  He reports taking the Xanax often twice a day, and on review of  he has been feeling every 2-3 weeks.  This is despite previous discussion that he should only take occasionally.  Discussed again today that he really needs to be on an SSRI/SNRI, he does not want to do that.  He is agreeable to seeing psychiatry.    Review of Systems   Constitutional: Positive for activity change. Negative for unexpected weight change.   HENT: Negative for hearing loss, rhinorrhea and trouble swallowing.    Eyes: Negative for discharge and visual disturbance.   Respiratory: Negative for chest tightness and wheezing.    Cardiovascular: Positive for palpitations. Negative for chest pain.   Gastrointestinal: Negative for blood in stool, constipation, diarrhea and  vomiting.   Endocrine: Negative for polydipsia and polyuria.   Genitourinary: Negative for difficulty urinating, hematuria and urgency.   Musculoskeletal: Negative for arthralgias, joint swelling and neck pain.   Neurological: Negative for weakness and headaches.   Psychiatric/Behavioral: Negative for confusion and dysphoric mood. The patient is nervous/anxious.      History reviewed. No pertinent past medical history.       Past Surgical History:   Procedure Laterality Date    FOREARM FRACTURE SURGERY      NOSE SURGERY      SINUS SURGERY      TYMPANOSTOMY TUBE PLACEMENT       Family History   Problem Relation Age of Onset    Diabetes Maternal Grandfather     Heart disease Maternal Grandfather      Social History     Socioeconomic History    Marital status:      Spouse name: Not on file    Number of children: Not on file    Years of education: Not on file    Highest education level: Not on file   Occupational History    Occupation: operation   Social Needs    Financial resource strain: Somewhat hard    Food insecurity     Worry: Never true     Inability: Never true    Transportation needs     Medical: No     Non-medical: No   Tobacco Use    Smoking status: Never Smoker    Smokeless tobacco: Current User     Types: Chew   Substance and Sexual Activity    Alcohol use: Yes     Frequency: 2-4 times a month     Drinks per session: 3 or 4     Binge frequency: Less than monthly    Drug use: No    Sexual activity: Yes     Partners: Female   Lifestyle    Physical activity     Days per week: 5 days     Minutes per session: 0 min    Stress: Very much   Relationships    Social connections     Talks on phone: Twice a week     Gets together: Once a week     Attends Christianity service: Not on file     Active member of club or organization: Yes     Attends meetings of clubs or organizations: 1 to 4 times per year     Relationship status: Living with partner   Other Topics Concern    Not on file   Social  History Narrative    Not on file     Review of patient's allergies indicates:  No Known Allergies    Objective:       There were no vitals taken for this visit.  Physical Exam  Constitutional:       General: He is not in acute distress.     Appearance: Normal appearance. He is well-developed. He is not ill-appearing or diaphoretic.   Neurological:      General: No focal deficit present.      Mental Status: He is alert and oriented to person, place, and time.   Psychiatric:         Mood and Affect: Mood normal.         Behavior: Behavior normal.         Thought Content: Thought content normal.         Judgment: Judgment normal.       Assessment:     1. Anxiety     Costs  Plan:   Anxiety  -     Ambulatory referral/consult to Psychiatry; Future; Expected date: 07/22/2020    Other orders  -     ALPRAZolam (XANAX) 0.5 MG tablet; Take 1 tablet (0.5 mg total) by mouth 2 (two) times daily as needed for Anxiety (only when needed).  Dispense: 20 tablet; Refill: 2      Medication List with Changes/Refills   Current Medications    HYDROXYZINE HCL (ATARAX) 25 MG TABLET    Take 1 tablet (25 mg total) by mouth 3 (three) times daily as needed for Anxiety.    IBUPROFEN (ADVIL,MOTRIN) 800 MG TABLET    Take 1 tablet (800 mg total) by mouth 3 (three) times daily as needed for Pain.    MULTIVITAMIN (ONE DAILY MULTIVITAMIN) PER TABLET    Take 1 tablet by mouth.   Changed and/or Refilled Medications    Modified Medication Previous Medication    ALPRAZOLAM (XANAX) 0.5 MG TABLET ALPRAZolam (XANAX) 0.5 MG tablet       Take 1 tablet (0.5 mg total) by mouth 2 (two) times daily as needed for Anxiety (only when needed).    Take 1 tablet (0.5 mg total) by mouth 2 (two) times daily as needed for Anxiety (only when needed).

## 2020-08-13 ENCOUNTER — OFFICE VISIT (OUTPATIENT)
Dept: PSYCHIATRY | Facility: CLINIC | Age: 35
End: 2020-08-13
Payer: COMMERCIAL

## 2020-08-13 DIAGNOSIS — F41.1 GENERALIZED ANXIETY DISORDER WITH PANIC ATTACKS: Primary | ICD-10-CM

## 2020-08-13 DIAGNOSIS — F41.9 ANXIETY: ICD-10-CM

## 2020-08-13 DIAGNOSIS — Z86.59 HISTORY OF POSTTRAUMATIC STRESS DISORDER (PTSD): ICD-10-CM

## 2020-08-13 DIAGNOSIS — F41.0 GENERALIZED ANXIETY DISORDER WITH PANIC ATTACKS: Primary | ICD-10-CM

## 2020-08-13 PROCEDURE — 90792 PSYCH DIAG EVAL W/MED SRVCS: CPT | Mod: 95,,, | Performed by: PSYCHIATRY & NEUROLOGY

## 2020-08-13 PROCEDURE — 90792 PR PSYCHIATRIC DIAGNOSTIC EVALUATION W/MEDICAL SERVICES: ICD-10-PCS | Mod: 95,,, | Performed by: PSYCHIATRY & NEUROLOGY

## 2020-08-13 RX ORDER — ALPRAZOLAM 0.5 MG/1
0.5 TABLET ORAL 2 TIMES DAILY PRN
Qty: 20 TABLET | Refills: 2 | Status: SHIPPED | OUTPATIENT
Start: 2020-08-13 | End: 2020-11-03 | Stop reason: SDUPTHER

## 2020-08-13 NOTE — PATIENT INSTRUCTIONS
Understanding Generalized Anxiety Disorder (WENDIE)  Anxiety can fill you with worry and fear. Sometimes anxiety is healthy. It alerts you to a potential threat and gets you to respond and take action. But for some people, anxiety gets so bad it causes problems in daily life. If you find yourself in a constant state of anxiety, you may have an anxiety disorder called generalized anxiety disorder (WENDIE). Speak with your healthcare provider or mental health professional to learn more. He or she can help.     What is generalized anxiety disorder?  With WENDIE, you might worry about money, your family and friends, work, or the world in general. You might not even be sure what you're anxious about. But whatever it is, you have an intense fear that the worst will happen. These feelings never really go away. In people age 65 and older, WENDIE is one of the most commonly diagnosed anxiety disorders.  Many times it occurs with depression. This constant worry affects your quality of life and makes it hard to function. WENDIE can cause physical symptoms, too.  What are common symptoms of generalized anxiety disorder?  People with WENDIE often think they have a physical illness. The disorder can cause symptoms, such as:  · Muscle tension, especially in the neck and shoulders  · Nausea and stomach problems  · Frequent headaches  · Feeling lightheaded  · Restlessness, trouble sleeping  · Feeling irritable and on edge all the time  How can generalized anxiety disorder be treated?  WENDIE can be treated with medicine or therapy (also called counseling), or both. Medicine helps to reduce symptoms, so you can continue with your daily routine. Therapy helps you understand the cause of your anxiety and learn how to manage it. Both forms of treatment help you deal with problems that anxiety causes in your life. This helps you to be healthier and happier.  Date Last Reviewed: 5/1/2017  © 9824-6543 [employer]. [address] [facility],  [place] [address]. All rights reserved. This information is not intended as a substitute for professional medical care. Always follow your healthcare professional's instructions.

## 2020-08-13 NOTE — PROGRESS NOTES
"Outpatient Psychiatry Initial Visit (MD/NP)    2020    Rahul Farrar, a 34 y.o. male, presenting for initial evaluation visit. Met with patient.    Reason for Encounter: referral from PCP. Patient complains of anxiety    History of Present Illness:     35 yo male who was referred by Dr. Long, anxiety. Currently Xanax .5 mg twice a day. He states that he doesn't take one   She has originally prescribed it to you  She had prescribed it originally   He has tried different medications: buspirone treid it for three weeks   Then prescribed an anti-depressant:   They tried different types of medications, the psychiatrist tried   Lexapro, seroquel, ambien,      had a baby, found out wife was pregnant October daughter born, new job, and had a lot of anxiety, hard time to get things done. Had a lot on his plate, that was stressful, and he had anxiety about being a dad.   Dad has depression     Step dad committed suicide when Rahul was 21 yo, he had a DWI and mom and him were mad at him, Dad texted him. They switched medicines a lot , he was prescribed seroquel and ambien at the same time. Was on lexapro  A lot of problems with sleep, he nightmares, sleep depressed  Mom "checked out"  He went to a grief counselor   He was on too much medication: later tapered off of the medications by another doctor who also provided counseling, and he felt that he developed the coping skills to deal with anxiety and grief     His symptoms of anxiety, with anxiety attacks were reactivated when he became anxious about being a father. The anxiety worsened when his wife had an emergency  and their daughter had complications of jaundice.           Reviewed the following     GAD7 2020   1. Feeling nervous, anxious, or on edge? 1   2. Not being able to stop or control worrying? 1   3. Worrying too much about different things? 1   4. Trouble relaxing? 3   5. Being so restless that it is hard to sit still? 2   6. " Becoming easily annoyed or irritable? 1   7. Feeling afraid as if something awful might happen? 1   WENDIE-7 Score 10     WENDIE-7 Score: (P) 10  Interpretation: (P) Moderate Anxiety      Little interest or pleasure in doing things: (P) More than half the days  Feeling down, depressed, or hopeless: (P) Not at all  Trouble falling or staying asleep, or sleeping too much: (P) More than half the days  Feeling tired or having little energy: (P) Nearly every day  Poor appetite or overeating: (P) Not at all  Feeling bad about yourself - or that you are a failure or have let yourself or your family down: (P) Not at all  Trouble concentrating on things, such as reading the newspaper or watching television: (P) Not at all  Moving or speaking so slowly that other people could have noticed. Or the opposite - being so fidgety or restless that you have been moving around a lot more than usual: (P) Not at all  PHQ-9 Total Score: (P) 7     After the panic attacks, he was doing a lot of things, but his energy levels were down due to feeling overwhelmed.   So it was more avoidant behavior he has an interest in doing things, but he was avoiding thinking too far     MDQ Scale 8/13/2020   you felt so good or so hyper that other people thought you were not your normal self or you were so hyper that you got into trouble? 0   you were so irritable that you shouted at people or started fights or arguments? 1   you felt much more self-confident than usual? 0   you got much less sleep than usual and found that you didn't really miss it? 0   you were more talkative or spoke much faster than usual? 0   thoughts raced through your head or you couldn't slow your mind down? 1   you were so easily distracted by things around you that you had trouble concentrating or staying on track? 0   you had more energy than usual? 0   you were much more active or did many more things than usual? 0   you were much more social or outgoing than usual, for example, you  "telephoned friends in the middle of the night? 0   you were much more interested in sex than usual? 0   you did things that were unusual for you or that other people might have thought were excessive, foolish, or risky? 0   spending money got you or your family in trouble? 0   If you checked YES to more than one of the above, have several of these ever happened during the same period of time? 0   How much of a problem did any of these cause you - like being unable to work; having family, money or legal troubles; getting into arguments or fights? Minor problem   Mood Disorder Questionnaire Score  2         Review Of Systems:     Pertinent items are noted in HPI.    Current Evaluation:     Nutritional Screening: Considering the patient's height and weight, medications, medical history and preferences, should a referral be made to the dietitian? no    Constitutional  Vitals:  Most recent vital signs, dated less than 90 days prior to this appointment, were reviewed.      Last 3 sets of Vitals    Vitals - 1 value per visit 1/8/2020 3/5/2020 3/6/2020   SYSTOLIC 126 113 118   DIASTOLIC 84 71 80   PULSE 72 71 74   TEMPERATURE - - 98.4   RESPIRATIONS - - -   SPO2 - - 99   Weight (lb) 212.3 215.83 216.93   Weight (kg) 96.3 97.9 98.4   HEIGHT 5' 10" 5' 10" 5' 10"   BODY MASS INDEX 30.46 30.97 31.13   VISIT REPORT - - -   Pain Score  8 7 0       General:  unremarkable, age appropriate     Musculoskeletal  Muscle Strength/Tone:  not examined   Gait & Station:  non-ataxic     Psychiatric  Speech:  no latency; no press   Mood & Affect:  anxious  congruent and appropriate   Thought Process:  normal and logical   Associations:  intact   Thought Content:  suicidal thoughts: (active-no, passive-no), homicidal thoughts: (active-no, passive-no)   Insight:  intact   Judgement: behavior is adequate to circumstances   Orientation:  grossly intact   Memory: intact for content of interview   Language: grossly intact   Attention Span & " Concentration:  able to focus   Fund of Knowledge:  intact and appropriate to age and level of education       Relevant Elements of Neurological Exam: normal gait        Psychiatric history: has participated in counseling/psychotherapy on an outpatient basis in the past    Medical history: none  No medical history       Family history of psychiatric illness:   Mom suffers from depression     Social history (marriage, employment, etc.):   Step dad  from suicide attempt  Dad hit by a drunk  and has brain damage  He worked as a lead manager  Currently  has one daughter    Substance Abuse History:  Does have a history of drinking, he started drinking again a glass of wine a year ago   Now he drinks a glass of wine before he gets on a plane  Had a history of DWI  Does use tobacco, dip    Laboratory Data  No visits with results within 1 Month(s) from this visit.   Latest known visit with results is:   Lab Visit on 2020   Component Date Value Ref Range Status    Testosterone 2020 293  250 - 1100 ng/dL Final    Testosterone, Free 2020 60.7  46.0 - 224.0 pg/mL Final    Testosterone, Bioavailable 2020 122.1  110.0 - 575.0 ng/dL Final    Sex Hormone Binding Globulin 2020 17  10 - 50 nmol/L Final    Albumin 2020 4.4  3.6 - 5.1 g/dL Final         Medications  Outpatient Encounter Medications as of 2020   Medication Sig Dispense Refill    ALPRAZolam (XANAX) 0.5 MG tablet Take 1 tablet (0.5 mg total) by mouth 2 (two) times daily as needed for Anxiety (only when needed). 20 tablet 2    hydroxyzine HCL (ATARAX) 25 MG tablet Take 1 tablet (25 mg total) by mouth 3 (three) times daily as needed for Anxiety. 60 tablet 3    ibuprofen (ADVIL,MOTRIN) 800 MG tablet Take 1 tablet (800 mg total) by mouth 3 (three) times daily as needed for Pain. 60 tablet 0    multivitamin (ONE DAILY MULTIVITAMIN) per tablet Take 1 tablet by mouth.      [DISCONTINUED] ALPRAZolam (XANAX) 0.5  MG tablet Take 1 tablet (0.5 mg total) by mouth 2 (two) times daily as needed for Anxiety (only when needed). 20 tablet 2     No facility-administered encounter medications on file as of 8/13/2020.              Assessment - Diagnosis - Goals:     Impression:     Encounter Diagnoses   Name Primary?    Anxiety     Generalized anxiety disorder with panic attacks Yes    History of posttraumatic stress disorder (PTSD)        Referral to therapist for treatment of anxiety   Will begin to taper off of xanax once patient is established in therapy      Strengths and Liabilities: Strength: Patient accepts guidance/feedback, Strength: Patient is expressive/articulate., Strength: Patient is motivated for change., Liability: Patient lacks coping skills.    Treatment Goals:  Specify outcomes written in observable, behavioral terms:   Anxiety: reducing negative automatic thoughts, reducing physical symptoms of anxiety and reducing time spent worrying (<30 minutes/day)    Treatment Plan/Recommendations:   · Medication Management: Continue current medications. The risks and benefits of medication were discussed with the patient.    Generalized anxiety disorder with panic attacks    Anxiety  -     Ambulatory referral/consult to Psychiatry    History of posttraumatic stress disorder (PTSD)    Other orders  -     ALPRAZolam (XANAX) 0.5 MG tablet; Take 1 tablet (0.5 mg total) by mouth 2 (two) times daily as needed for Anxiety (only when needed).  Dispense: 20 tablet; Refill: 2        Return to Clinic: 6 weeks    Counseling time: 10  Total time: 50

## 2020-09-10 ENCOUNTER — OFFICE VISIT (OUTPATIENT)
Dept: INTERNAL MEDICINE | Facility: CLINIC | Age: 35
End: 2020-09-10
Payer: COMMERCIAL

## 2020-09-10 VITALS
HEART RATE: 62 BPM | WEIGHT: 223.75 LBS | OXYGEN SATURATION: 98 % | BODY MASS INDEX: 32.03 KG/M2 | DIASTOLIC BLOOD PRESSURE: 82 MMHG | HEIGHT: 70 IN | TEMPERATURE: 99 F | SYSTOLIC BLOOD PRESSURE: 126 MMHG

## 2020-09-10 DIAGNOSIS — R68.82 DECREASED LIBIDO: ICD-10-CM

## 2020-09-10 DIAGNOSIS — H72.91 PERFORATION OF RIGHT TYMPANIC MEMBRANE: Primary | ICD-10-CM

## 2020-09-10 PROCEDURE — 99999 PR PBB SHADOW E&M-EST. PATIENT-LVL IV: CPT | Mod: PBBFAC,,, | Performed by: FAMILY MEDICINE

## 2020-09-10 PROCEDURE — 99999 PR PBB SHADOW E&M-EST. PATIENT-LVL IV: ICD-10-PCS | Mod: PBBFAC,,, | Performed by: FAMILY MEDICINE

## 2020-09-10 PROCEDURE — 99213 OFFICE O/P EST LOW 20 MIN: CPT | Mod: S$GLB,,, | Performed by: FAMILY MEDICINE

## 2020-09-10 PROCEDURE — 99213 PR OFFICE/OUTPT VISIT, EST, LEVL III, 20-29 MIN: ICD-10-PCS | Mod: S$GLB,,, | Performed by: FAMILY MEDICINE

## 2020-09-10 PROCEDURE — 3008F PR BODY MASS INDEX (BMI) DOCUMENTED: ICD-10-PCS | Mod: CPTII,S$GLB,, | Performed by: FAMILY MEDICINE

## 2020-09-10 PROCEDURE — 3008F BODY MASS INDEX DOCD: CPT | Mod: CPTII,S$GLB,, | Performed by: FAMILY MEDICINE

## 2020-09-10 NOTE — PATIENT INSTRUCTIONS
Eardrum Rupture (Perforation)    Your eardrum is a thin membrane between your outer and middle ear. Sound waves entering your ear cause the membrane to vibrate. This helps you hear. An injury or infection can cause your eardrum to tear (rupture). This creates a hole (perforation) that may affect your hearing.  Causes of eardrum perforation  Causes of a ruptured eardrum include:  · Pressure from an ear infection  · Putting an object such as a cotton swab or pencil into the ear  · A very loud noise such as a gunshot close to the ear  · Rapid changes in air pressure. These can happen during scuba diving or traveling at high altitudes.  · A slap or blow to the ear  When to go to the emergency room (ER)  Seek medical care right away if you:  · Have severe pain, bleeding, or ringing in your ear.  · Lose your hearing suddenly.  · Become very dizzy for no reason.  · Have an object lodged in your ear.  A ruptured eardrum from an ear infection usually isn't an emergency. In fact, the rupture often relieves pressure and pain. It usually heals within hours or days. But you should have the ear looked at by a healthcare provider within 24 hours.  What to expect in the ER  Your ear will be examined. Treatment will depend on how severe the damage is. Small holes often heal on their own. A small patch may be placed over a minor eardrum tear. Large tears may need to be repaired during an operation. If you are very dizzy or have severe hearing loss, you are likely to stay in the hospital for treatment for one or more days.  Don't clean inside the ear canal with cotton swabs or any other object.   Date Last Reviewed: 10/1/2016  © 5953-4771 [employer]. [address] [place] [address], [place] [address]. All rights reserved. This information is not intended as a substitute for professional medical care. Always follow your healthcare professional's instructions.

## 2020-09-13 ENCOUNTER — PATIENT OUTREACH (OUTPATIENT)
Dept: ADMINISTRATIVE | Facility: OTHER | Age: 35
End: 2020-09-13

## 2020-09-14 ENCOUNTER — LAB VISIT (OUTPATIENT)
Dept: LAB | Facility: HOSPITAL | Age: 35
End: 2020-09-14
Attending: UROLOGY
Payer: COMMERCIAL

## 2020-09-14 ENCOUNTER — OFFICE VISIT (OUTPATIENT)
Dept: UROLOGY | Facility: CLINIC | Age: 35
End: 2020-09-14
Payer: COMMERCIAL

## 2020-09-14 VITALS
DIASTOLIC BLOOD PRESSURE: 78 MMHG | WEIGHT: 223.75 LBS | SYSTOLIC BLOOD PRESSURE: 128 MMHG | HEIGHT: 70 IN | HEART RATE: 81 BPM | BODY MASS INDEX: 32.03 KG/M2

## 2020-09-14 DIAGNOSIS — R68.82 DECREASED LIBIDO: ICD-10-CM

## 2020-09-14 LAB
BILIRUB SERPL-MCNC: NORMAL MG/DL
BLOOD URINE, POC: NORMAL
CLARITY, POC UA: NORMAL
COLOR, POC UA: YELLOW
FSH SERPL-ACNC: 2.6 MIU/ML (ref 0.95–11.95)
GLUCOSE UR QL STRIP: NORMAL
KETONES UR QL STRIP: NORMAL
LEUKOCYTE ESTERASE URINE, POC: NORMAL
LH SERPL-ACNC: 2.3 MIU/ML (ref 0.6–12.1)
NITRITE, POC UA: NORMAL
PH, POC UA: 5
PROLACTIN SERPL IA-MCNC: 7.8 NG/ML (ref 3.5–19.4)
PROTEIN, POC: NORMAL
SPECIFIC GRAVITY, POC UA: 1.02
UROBILINOGEN, POC UA: NORMAL

## 2020-09-14 PROCEDURE — 36415 COLL VENOUS BLD VENIPUNCTURE: CPT

## 2020-09-14 PROCEDURE — 81002 POCT URINE DIPSTICK WITHOUT MICROSCOPE: ICD-10-PCS | Mod: S$GLB,,, | Performed by: UROLOGY

## 2020-09-14 PROCEDURE — 3008F PR BODY MASS INDEX (BMI) DOCUMENTED: ICD-10-PCS | Mod: CPTII,S$GLB,, | Performed by: UROLOGY

## 2020-09-14 PROCEDURE — 83001 ASSAY OF GONADOTROPIN (FSH): CPT

## 2020-09-14 PROCEDURE — 81002 URINALYSIS NONAUTO W/O SCOPE: CPT | Mod: S$GLB,,, | Performed by: UROLOGY

## 2020-09-14 PROCEDURE — 99999 PR PBB SHADOW E&M-EST. PATIENT-LVL III: ICD-10-PCS | Mod: PBBFAC,,, | Performed by: UROLOGY

## 2020-09-14 PROCEDURE — 84146 ASSAY OF PROLACTIN: CPT

## 2020-09-14 PROCEDURE — 83002 ASSAY OF GONADOTROPIN (LH): CPT

## 2020-09-14 PROCEDURE — 3008F BODY MASS INDEX DOCD: CPT | Mod: CPTII,S$GLB,, | Performed by: UROLOGY

## 2020-09-14 PROCEDURE — 99999 PR PBB SHADOW E&M-EST. PATIENT-LVL III: CPT | Mod: PBBFAC,,, | Performed by: UROLOGY

## 2020-09-14 PROCEDURE — 99203 OFFICE O/P NEW LOW 30 MIN: CPT | Mod: 25,S$GLB,, | Performed by: UROLOGY

## 2020-09-14 PROCEDURE — 99203 PR OFFICE/OUTPT VISIT, NEW, LEVL III, 30-44 MIN: ICD-10-PCS | Mod: 25,S$GLB,, | Performed by: UROLOGY

## 2020-09-14 NOTE — LETTER
September 14, 2020      Nain Long MD  [id number] [address] [facility]  [address] [place] [id number]  Phone: [phone]  Fax: [phone]          Patient: Rahul Farrar   MR Number: [id number]   YOB: 1985   Date of Visit: 9/14/2020       Dear Dr. Nain Long:    Thank you for referring Rahul Farrar to me for evaluation. Attached you will find relevant portions of my assessment and plan of care.    If you have questions, please do not hesitate to call me. I look forward to following Rahul Farrar along with you.    Sincerely,    Manav Cam IV, MD    Enclosure  CC:  No Recipients    If you would like to receive this communication electronically, please contact [email or web address] or [phone] to request more information on [employer] access.    For providers and/or their staff who would like to refer a patient to [facility], please contact us through our one-stop-shop provider referral line, [facility], at [phone].    If you feel you have received this communication in error or would no longer like to receive these types of communications, please e-mail [email or web address]

## 2020-09-14 NOTE — PROGRESS NOTES
Chief Complaint: Fatigue    HPI:   9/14/20: 34 yo man wants to discuss low T.  Has had two runs of labs and total T is mildly low but free T is normal.  Low libido, low energy level.  Fertility good his wife just had a baby.  No abd/pelvic pain and no exac/rel factors.  No hematuria.  No urolithiasis.  No urinary bother.  No  history.  Normal sexual function.  No illicit drugs.  Takes xanax sometimes.  Wants to maintain fertility.  Did exogenous T age 19 once or twice.    Allergies:  Patient has no known allergies.    Medications:  has a current medication list which includes the following prescription(s): alprazolam, multivitamin, and hydroxyzine hcl.    Review of Systems:  General: No fever, chills, fatigability, or weight loss.  Skin: No rashes, itching, or changes in color or texture of skin.  Chest: Denies LAKHANI, cyanosis, wheezing, cough, and sputum production.  Abdomen: Appetite fine. No weight loss. Denies diarrhea, abdominal pain, hematemesis, or blood in stool.  Musculoskeletal: No joint stiffness or swelling. Denies back pain.  : As above.  All other review of systems negative.    PMH:   has no past medical history on file.    PSH:   has a past surgical history that includes Nose surgery; Forearm fracture surgery; Tympanostomy tube placement; and Sinus surgery.    FamHx: family history includes Diabetes in his maternal grandfather; Heart disease in his maternal grandfather.    SocHx:  reports that he has never smoked. His smokeless tobacco use includes chew. He reports current alcohol use. He reports that he does not use drugs.      Physical Exam:  Vitals:    09/14/20 1505   BP: 128/78   Pulse: 81     General: A&Ox3, no apparent distress, no deformities  Neck: No masses, normal thyroid  Lungs: normal inspiration, no use of accessory muscles  Heart: normal pulse, no arrhythmias  Abdomen: Soft, NT, ND, no masses, no hernias, no hepatosplenomegaly  Lymphatic: Neck and groin nodes negative  Skin: The skin  is warm and dry. No jaundice.  Ext: No c/c/e.  : Test desc jay jay, no abnormalities of epididymus. Penis normal, with normal penile and scrotal skin. Meatus normal.     Labs/Studies:   Urinalysis performed in clinic, summary: UA normal     Impression/Plan:   1. No reccs for T supplementation but will check pituitary levels to ensure nothing else wrong to worry over.  Sleep more.  6 mo recheck.

## 2020-09-14 NOTE — PROGRESS NOTES
Health Maintenance Due   Topic Date Due    Hepatitis C Screening  1985    HIV Screening  08/30/2000    Influenza Vaccine (1) 08/01/2020     Updates were requested from care everywhere.  Chart was reviewed for overdue Proactive [facility] Encounters (JOSE) topics (CRS, Breast Cancer Screening, Eye exam)  Health Maintenance has been updated.  LINKS immunization registry triggered.  Immunizations were reconciled.

## 2020-09-14 NOTE — PROGRESS NOTES
Subjective:      Patient ID: Rahul Farrar is a 35 y.o. male.    Chief Complaint: Otalgia      Patient reports he had jumped into a pool this past weekend, felt a sharp pain in his right ear and then somewhat decreased hearing. No visible discharge from the ear, it is not currently painful. Reports he has had several perforations of TM in the past, suspects it has happened again.  Also wanting to discuss testosterone levels checked in the past year - results in normal range but he is concerned because they were on low end of normal. Has had intermittent ed issues.    Review of Systems   Constitutional: Negative for activity change and unexpected weight change.   HENT: Positive for ear pain and hearing loss. Negative for ear discharge, rhinorrhea and trouble swallowing.    Eyes: Negative for discharge and visual disturbance.   Respiratory: Negative for chest tightness and wheezing.    Cardiovascular: Negative for chest pain and palpitations.   Gastrointestinal: Negative for blood in stool, constipation, diarrhea and vomiting.   Endocrine: Negative for polydipsia and polyuria.   Genitourinary: Negative for difficulty urinating, hematuria and urgency.   Musculoskeletal: Negative for arthralgias, joint swelling and neck pain.   Neurological: Negative for weakness and headaches.   Psychiatric/Behavioral: Negative for confusion and dysphoric mood.     History reviewed. No pertinent past medical history.       Past Surgical History:   Procedure Laterality Date    FOREARM FRACTURE SURGERY      NOSE SURGERY      SINUS SURGERY      TYMPANOSTOMY TUBE PLACEMENT       Family History   Problem Relation Age of Onset    Diabetes Maternal Grandfather     Heart disease Maternal Grandfather      Social History     Socioeconomic History    Marital status:      Spouse name: Not on file    Number of children: Not on file    Years of education: Not on file    Highest education level: Not on file   Occupational  "History    Occupation: operation   Social Needs    Financial resource strain: Somewhat hard    Food insecurity     Worry: Never true     Inability: Never true    Transportation needs     Medical: No     Non-medical: No   Tobacco Use    Smoking status: Never Smoker    Smokeless tobacco: Current User     Types: Chew   Substance and Sexual Activity    Alcohol use: Yes     Frequency: 2-4 times a month     Drinks per session: 3 or 4     Binge frequency: Less than monthly    Drug use: No    Sexual activity: Yes     Partners: Female   Lifestyle    Physical activity     Days per week: 5 days     Minutes per session: 0 min    Stress: Very much   Relationships    Social connections     Talks on phone: Twice a week     Gets together: Once a week     Attends [employer] service: Not on file     Active member of club or organization: Yes     Attends meetings of clubs or organizations: 1 to 4 times per year     Relationship status: Living with partner   Other Topics Concern    Not on file   Social History Narrative    Not on file     Review of patient's allergies indicates:  No Known Allergies    Objective:       /82 (BP Location: Right arm)   Pulse 62   Temp 99.3 °F (37.4 °C) (Tympanic)   Ht 5' 10" (1.778 m)   Wt 101.5 kg (223 lb 12.3 oz)   SpO2 98%   BMI 32.11 kg/m²   Physical Exam  Constitutional:       General: He is not in acute distress.     Appearance: Normal appearance. He is well-developed. He is not ill-appearing or diaphoretic.   HENT:      Head: Normocephalic.      Right Ear: Ear canal and external ear normal. No drainage. Tympanic membrane is perforated (small perforation noted).      Left Ear: Hearing, tympanic membrane, ear canal and external ear normal.   Neurological:      General: No focal deficit present.      Mental Status: He is alert and oriented to person, place, and time.   Psychiatric:         Mood and Affect: Mood normal.         Behavior: Behavior normal.         Thought Content: " Thought content normal.         Judgment: Judgment normal.       Assessment:     1. Perforation of right tympanic membrane    2. Decreased libido      Plan:   Perforation of right tympanic membrane    Decreased libido  -     Ambulatory referral/consult to Urology; Future; Expected date: 09/17/2020      Medication List with Changes/Refills   Current Medications    ALPRAZOLAM (XANAX) 0.5 MG TABLET    Take 1 tablet (0.5 mg total) by mouth 2 (two) times daily as needed for Anxiety (only when needed).    HYDROXYZINE HCL (ATARAX) 25 MG TABLET    Take 1 tablet (25 mg total) by mouth 3 (three) times daily as needed for Anxiety.    MULTIVITAMIN (ONE DAILY MULTIVITAMIN) PER TABLET    Take 1 tablet by mouth.   Discontinued Medications    IBUPROFEN (ADVIL,MOTRIN) 800 MG TABLET    Take 1 tablet (800 mg total) by mouth 3 (three) times daily as needed for Pain.

## 2020-10-30 ENCOUNTER — PATIENT MESSAGE (OUTPATIENT)
Dept: PSYCHIATRY | Facility: CLINIC | Age: 35
End: 2020-10-30

## 2020-10-31 ENCOUNTER — NURSE TRIAGE (OUTPATIENT)
Dept: ADMINISTRATIVE | Facility: CLINIC | Age: 35
End: 2020-10-31

## 2020-10-31 NOTE — TELEPHONE ENCOUNTER
"  Reason for Disposition   Caller requesting a CONTROLLED substance prescription refill (e.g., narcotics, ADHD medicines)    Additional Information   Negative: Drug overdose and triager unable to answer question   Negative: Caller requesting information unrelated to medicine   Negative: Caller requesting a prescription for Strep throat and has a positive culture result   Negative: Rash while taking a medication or within 3 days of stopping it   Negative: Immunization reaction suspected   Negative: [1] Asthma and [2] having symptoms of asthma (cough, wheezing, etc.)   Negative: [1] Influenza symptoms AND [2] anti-viral med prescription request, such as Tamiflu   Negative: [1] Symptom of illness (e.g., headache, abdominal pain, earache, vomiting) AND [2] more than mild   Negative: MORE THAN A DOUBLE DOSE of a prescription or over-the-counter (OTC) drug   Negative: [1] DOUBLE DOSE (an extra dose or lesser amount) of over-the-counter (OTC) drug AND [2] any symptoms (e.g., dizziness, nausea, pain, sleepiness)   Negative: [1] DOUBLE DOSE (an extra dose or lesser amount) of prescription drug AND [2] any symptoms (e.g., dizziness, nausea, pain, sleepiness)   Negative: Took another person's prescription drug   Negative: [1] DOUBLE DOSE (an extra dose or lesser amount) of prescription drug AND [2] NO symptoms (Exception: a double dose of antibiotics)   Negative: Diabetes drug error or overdose (e.g., took wrong type of insulin or took extra dose)   Negative: [1] Request for URGENT new prescription or refill of "essential" medication (i.e., likelihood of harm to patient if not taken) AND [2] triager unable to fill per unit policy   Negative: [1] Prescription not at pharmacy AND [2] was prescribed by PCP recently   Negative: [1] Pharmacy calling with prescription questions AND [2] triager unable to answer question   Negative: [1] Caller has URGENT medication question about med that PCP or specialist " prescribed AND [2] triager unable to answer question   Negative: [1] Caller has NON-URGENT medication question about med that PCP prescribed AND [2] triager unable to answer question   Negative: [1] Caller requesting a NON-URGENT new prescription or refill AND [2] triager unable to refill per unit policy   Negative: [1] Caller has medication question about med not prescribed by PCP AND [2] triager unable to answer question (e.g., compatibility with other med, storage)    Protocols used: MEDICATION QUESTION CALL-A-    Pt called re rx refill. Pt states he contacted office and hasnt heard anything. Office message sent re refill request. Pt notified MD on call will not refill any controlled subs. Call back with questions.

## 2020-11-02 RX ORDER — ALPRAZOLAM 0.5 MG/1
0.5 TABLET ORAL 2 TIMES DAILY PRN
Qty: 20 TABLET | Refills: 2 | OUTPATIENT
Start: 2020-11-02 | End: 2020-12-02

## 2020-11-03 DIAGNOSIS — F41.0 GENERALIZED ANXIETY DISORDER WITH PANIC ATTACKS: Primary | ICD-10-CM

## 2020-11-03 DIAGNOSIS — F41.1 GENERALIZED ANXIETY DISORDER WITH PANIC ATTACKS: Primary | ICD-10-CM

## 2020-11-03 RX ORDER — HYDROXYZINE HYDROCHLORIDE 25 MG/1
25 TABLET, FILM COATED ORAL 3 TIMES DAILY PRN
Qty: 60 TABLET | Refills: 3 | Status: SHIPPED | OUTPATIENT
Start: 2020-11-03 | End: 2021-01-29

## 2020-11-03 RX ORDER — ALPRAZOLAM 0.5 MG/1
0.5 TABLET ORAL 2 TIMES DAILY PRN
Qty: 20 TABLET | Refills: 2 | Status: SHIPPED | OUTPATIENT
Start: 2020-11-03 | End: 2021-01-12 | Stop reason: SDUPTHER

## 2021-01-14 RX ORDER — ALPRAZOLAM 0.5 MG/1
0.5 TABLET ORAL 2 TIMES DAILY PRN
Qty: 20 TABLET | Refills: 0 | Status: SHIPPED | OUTPATIENT
Start: 2021-01-14 | End: 2021-01-29

## 2021-01-15 RX ORDER — ALPRAZOLAM 0.5 MG/1
0.5 TABLET ORAL 2 TIMES DAILY PRN
Qty: 20 TABLET | Refills: 0 | OUTPATIENT
Start: 2021-01-15 | End: 2021-02-14

## 2021-01-15 RX ORDER — ALPRAZOLAM 0.5 MG/1
0.5 TABLET ORAL 2 TIMES DAILY PRN
Qty: 20 TABLET | Refills: 2 | Status: SHIPPED | OUTPATIENT
Start: 2021-01-15 | End: 2021-02-14

## 2021-01-28 ENCOUNTER — OFFICE VISIT (OUTPATIENT)
Dept: INTERNAL MEDICINE | Facility: CLINIC | Age: 36
End: 2021-01-28
Payer: COMMERCIAL

## 2021-01-28 VITALS
TEMPERATURE: 98 F | BODY MASS INDEX: 32.79 KG/M2 | DIASTOLIC BLOOD PRESSURE: 79 MMHG | HEART RATE: 56 BPM | HEIGHT: 70 IN | SYSTOLIC BLOOD PRESSURE: 119 MMHG | WEIGHT: 229.06 LBS | OXYGEN SATURATION: 97 %

## 2021-01-28 DIAGNOSIS — H72.91 PERFORATION OF RIGHT TYMPANIC MEMBRANE: Primary | ICD-10-CM

## 2021-01-28 PROCEDURE — 99213 PR OFFICE/OUTPT VISIT, EST, LEVL III, 20-29 MIN: ICD-10-PCS | Mod: S$GLB,,, | Performed by: FAMILY MEDICINE

## 2021-01-28 PROCEDURE — 3008F PR BODY MASS INDEX (BMI) DOCUMENTED: ICD-10-PCS | Mod: CPTII,S$GLB,, | Performed by: FAMILY MEDICINE

## 2021-01-28 PROCEDURE — 1126F PR PAIN SEVERITY QUANTIFIED, NO PAIN PRESENT: ICD-10-PCS | Mod: S$GLB,,, | Performed by: FAMILY MEDICINE

## 2021-01-28 PROCEDURE — 1126F AMNT PAIN NOTED NONE PRSNT: CPT | Mod: S$GLB,,, | Performed by: FAMILY MEDICINE

## 2021-01-28 PROCEDURE — 99213 OFFICE O/P EST LOW 20 MIN: CPT | Mod: S$GLB,,, | Performed by: FAMILY MEDICINE

## 2021-01-28 PROCEDURE — 99999 PR PBB SHADOW E&M-EST. PATIENT-LVL III: ICD-10-PCS | Mod: PBBFAC,,, | Performed by: FAMILY MEDICINE

## 2021-01-28 PROCEDURE — 99999 PR PBB SHADOW E&M-EST. PATIENT-LVL III: CPT | Mod: PBBFAC,,, | Performed by: FAMILY MEDICINE

## 2021-01-28 PROCEDURE — 3008F BODY MASS INDEX DOCD: CPT | Mod: CPTII,S$GLB,, | Performed by: FAMILY MEDICINE

## 2021-01-29 ENCOUNTER — OFFICE VISIT (OUTPATIENT)
Dept: PSYCHIATRY | Facility: CLINIC | Age: 36
End: 2021-01-29
Payer: COMMERCIAL

## 2021-01-29 DIAGNOSIS — F41.1 GENERALIZED ANXIETY DISORDER WITH PANIC ATTACKS: Primary | ICD-10-CM

## 2021-01-29 DIAGNOSIS — F41.0 GENERALIZED ANXIETY DISORDER WITH PANIC ATTACKS: Primary | ICD-10-CM

## 2021-01-29 PROCEDURE — 99214 OFFICE O/P EST MOD 30 MIN: CPT | Mod: 95,,, | Performed by: PSYCHIATRY & NEUROLOGY

## 2021-01-29 PROCEDURE — 99214 PR OFFICE/OUTPT VISIT, EST, LEVL IV, 30-39 MIN: ICD-10-PCS | Mod: 95,,, | Performed by: PSYCHIATRY & NEUROLOGY

## 2021-01-29 RX ORDER — ALPRAZOLAM 0.5 MG/1
0.5 TABLET ORAL 2 TIMES DAILY PRN
Qty: 20 TABLET | Refills: 3 | Status: SHIPPED | OUTPATIENT
Start: 2021-01-29 | End: 2021-05-11 | Stop reason: SDUPTHER

## 2021-01-29 RX ORDER — HYDROXYZINE HYDROCHLORIDE 25 MG/1
25 TABLET, FILM COATED ORAL 3 TIMES DAILY PRN
Qty: 60 TABLET | Refills: 3 | Status: SHIPPED | OUTPATIENT
Start: 2021-01-29 | End: 2021-02-22

## 2021-02-01 ENCOUNTER — OFFICE VISIT (OUTPATIENT)
Dept: PSYCHIATRY | Facility: CLINIC | Age: 36
End: 2021-02-01
Payer: COMMERCIAL

## 2021-02-01 DIAGNOSIS — F41.1 GENERALIZED ANXIETY DISORDER WITH PANIC ATTACKS: Primary | ICD-10-CM

## 2021-02-01 DIAGNOSIS — F41.0 GENERALIZED ANXIETY DISORDER WITH PANIC ATTACKS: Primary | ICD-10-CM

## 2021-02-01 PROCEDURE — 90791 PR PSYCHIATRIC DIAGNOSTIC EVALUATION: ICD-10-PCS | Mod: 95,,, | Performed by: SOCIAL WORKER

## 2021-02-01 PROCEDURE — 90791 PSYCH DIAGNOSTIC EVALUATION: CPT | Mod: 95,,, | Performed by: SOCIAL WORKER

## 2021-02-09 ENCOUNTER — OFFICE VISIT (OUTPATIENT)
Dept: PSYCHIATRY | Facility: CLINIC | Age: 36
End: 2021-02-09
Payer: COMMERCIAL

## 2021-02-09 DIAGNOSIS — F41.1 GENERALIZED ANXIETY DISORDER WITH PANIC ATTACKS: Primary | ICD-10-CM

## 2021-02-09 DIAGNOSIS — F41.0 GENERALIZED ANXIETY DISORDER WITH PANIC ATTACKS: Primary | ICD-10-CM

## 2021-02-09 PROCEDURE — 90834 PSYTX W PT 45 MINUTES: CPT | Mod: 95,,, | Performed by: SOCIAL WORKER

## 2021-02-09 PROCEDURE — 90834 PR PSYCHOTHERAPY W/PATIENT, 45 MIN: ICD-10-PCS | Mod: 95,,, | Performed by: SOCIAL WORKER

## 2021-02-11 ENCOUNTER — TELEPHONE (OUTPATIENT)
Dept: INTERNAL MEDICINE | Facility: CLINIC | Age: 36
End: 2021-02-11

## 2021-02-11 ENCOUNTER — HOSPITAL ENCOUNTER (EMERGENCY)
Facility: HOSPITAL | Age: 36
Discharge: HOME | End: 2021-02-11
Attending: EMERGENCY MEDICINE
Payer: COMMERCIAL

## 2021-02-11 VITALS
DIASTOLIC BLOOD PRESSURE: 89 MMHG | HEART RATE: 103 BPM | SYSTOLIC BLOOD PRESSURE: 136 MMHG | RESPIRATION RATE: 18 BRPM | HEIGHT: 70 IN | TEMPERATURE: 100 F | OXYGEN SATURATION: 95 % | BODY MASS INDEX: 32.93 KG/M2 | WEIGHT: 230.06 LBS

## 2021-02-11 DIAGNOSIS — J12.82 PNEUMONIA DUE TO COVID-19 VIRUS: Primary | ICD-10-CM

## 2021-02-11 DIAGNOSIS — U07.1 PNEUMONIA DUE TO COVID-19 VIRUS: Primary | ICD-10-CM

## 2021-02-11 DIAGNOSIS — Z20.822 SUSPECTED COVID-19 VIRUS INFECTION: ICD-10-CM

## 2021-02-11 LAB
ALBUMIN SERPL BCP-MCNC: 3.9 G/DL (ref 3.5–5.2)
ALP SERPL-CCNC: 65 U/L (ref 55–135)
ALT SERPL W/O P-5'-P-CCNC: 118 U/L (ref 10–44)
ANION GAP SERPL CALC-SCNC: 12 MMOL/L (ref 8–16)
AST SERPL-CCNC: 38 U/L (ref 10–40)
BASOPHILS # BLD AUTO: 0.03 K/UL (ref 0–0.2)
BASOPHILS NFR BLD: 0.3 % (ref 0–1.9)
BILIRUB SERPL-MCNC: 1.7 MG/DL (ref 0.1–1)
BILIRUB UR QL STRIP: NEGATIVE
BNP SERPL-MCNC: <10 PG/ML (ref 0–99)
BUN SERPL-MCNC: 9 MG/DL (ref 6–20)
CALCIUM SERPL-MCNC: 9.6 MG/DL (ref 8.7–10.5)
CHLORIDE SERPL-SCNC: 101 MMOL/L (ref 95–110)
CK SERPL-CCNC: 56 U/L (ref 20–200)
CLARITY UR: CLEAR
CO2 SERPL-SCNC: 25 MMOL/L (ref 23–29)
COLOR UR: YELLOW
CREAT SERPL-MCNC: 1.1 MG/DL (ref 0.5–1.4)
CRP SERPL-MCNC: 159 MG/L (ref 0–8.2)
CTP QC/QA: YES
DIFFERENTIAL METHOD: ABNORMAL
EOSINOPHIL # BLD AUTO: 0 K/UL (ref 0–0.5)
EOSINOPHIL NFR BLD: 0.1 % (ref 0–8)
ERYTHROCYTE [DISTWIDTH] IN BLOOD BY AUTOMATED COUNT: 11.3 % (ref 11.5–14.5)
EST. GFR  (AFRICAN AMERICAN): >60 ML/MIN/1.73 M^2
EST. GFR  (NON AFRICAN AMERICAN): >60 ML/MIN/1.73 M^2
GLUCOSE SERPL-MCNC: 120 MG/DL (ref 70–110)
GLUCOSE UR QL STRIP: NEGATIVE
HCT VFR BLD AUTO: 45.1 % (ref 40–54)
HCV AB SERPL QL IA: NEGATIVE
HGB BLD-MCNC: 15.5 G/DL (ref 14–18)
HGB UR QL STRIP: NEGATIVE
HIV 1+2 AB+HIV1 P24 AG SERPL QL IA: NEGATIVE
IMM GRANULOCYTES # BLD AUTO: 0.06 K/UL (ref 0–0.04)
IMM GRANULOCYTES NFR BLD AUTO: 0.5 % (ref 0–0.5)
KETONES UR QL STRIP: NEGATIVE
LACTATE SERPL-SCNC: 2 MMOL/L (ref 0.5–2.2)
LDH SERPL L TO P-CCNC: 207 U/L (ref 110–260)
LEUKOCYTE ESTERASE UR QL STRIP: NEGATIVE
LYMPHOCYTES # BLD AUTO: 0.8 K/UL (ref 1–4.8)
LYMPHOCYTES NFR BLD: 6.9 % (ref 18–48)
MCH RBC QN AUTO: 30.6 PG (ref 27–31)
MCHC RBC AUTO-ENTMCNC: 34.4 G/DL (ref 32–36)
MCV RBC AUTO: 89 FL (ref 82–98)
MONOCYTES # BLD AUTO: 0.4 K/UL (ref 0.3–1)
MONOCYTES NFR BLD: 3 % (ref 4–15)
NEUTROPHILS # BLD AUTO: 10.6 K/UL (ref 1.8–7.7)
NEUTROPHILS NFR BLD: 89.2 % (ref 38–73)
NITRITE UR QL STRIP: NEGATIVE
NRBC BLD-RTO: 0 /100 WBC
PH UR STRIP: 7 [PH] (ref 5–8)
PLATELET # BLD AUTO: 189 K/UL (ref 150–350)
PMV BLD AUTO: 10.9 FL (ref 9.2–12.9)
POTASSIUM SERPL-SCNC: 4.2 MMOL/L (ref 3.5–5.1)
PROT SERPL-MCNC: 7.5 G/DL (ref 6–8.4)
PROT UR QL STRIP: NEGATIVE
RBC # BLD AUTO: 5.07 M/UL (ref 4.6–6.2)
SARS-COV-2 RDRP RESP QL NAA+PROBE: NEGATIVE
SODIUM SERPL-SCNC: 138 MMOL/L (ref 136–145)
SP GR UR STRIP: 1.01 (ref 1–1.03)
TROPONIN I SERPL DL<=0.01 NG/ML-MCNC: <0.006 NG/ML (ref 0–0.03)
URN SPEC COLLECT METH UR: NORMAL
UROBILINOGEN UR STRIP-ACNC: NEGATIVE EU/DL
WBC # BLD AUTO: 11.9 K/UL (ref 3.9–12.7)

## 2021-02-11 PROCEDURE — 83605 ASSAY OF LACTIC ACID: CPT

## 2021-02-11 PROCEDURE — 85025 COMPLETE CBC W/AUTO DIFF WBC: CPT

## 2021-02-11 PROCEDURE — 81003 URINALYSIS AUTO W/O SCOPE: CPT

## 2021-02-11 PROCEDURE — 25000003 PHARM REV CODE 250: Performed by: EMERGENCY MEDICINE

## 2021-02-11 PROCEDURE — 82550 ASSAY OF CK (CPK): CPT

## 2021-02-11 PROCEDURE — 93010 EKG 12-LEAD: ICD-10-PCS | Mod: ,,, | Performed by: INTERNAL MEDICINE

## 2021-02-11 PROCEDURE — 86703 HIV-1/HIV-2 1 RESULT ANTBDY: CPT

## 2021-02-11 PROCEDURE — 84484 ASSAY OF TROPONIN QUANT: CPT

## 2021-02-11 PROCEDURE — 80053 COMPREHEN METABOLIC PANEL: CPT

## 2021-02-11 PROCEDURE — 83615 LACTATE (LD) (LDH) ENZYME: CPT

## 2021-02-11 PROCEDURE — 83880 ASSAY OF NATRIURETIC PEPTIDE: CPT

## 2021-02-11 PROCEDURE — 96374 THER/PROPH/DIAG INJ IV PUSH: CPT | Mod: 59

## 2021-02-11 PROCEDURE — U0002 COVID-19 LAB TEST NON-CDC: HCPCS | Performed by: EMERGENCY MEDICINE

## 2021-02-11 PROCEDURE — 86803 HEPATITIS C AB TEST: CPT

## 2021-02-11 PROCEDURE — 99285 EMERGENCY DEPT VISIT HI MDM: CPT | Mod: 25

## 2021-02-11 PROCEDURE — 36415 COLL VENOUS BLD VENIPUNCTURE: CPT

## 2021-02-11 PROCEDURE — 82728 ASSAY OF FERRITIN: CPT

## 2021-02-11 PROCEDURE — 93005 ELECTROCARDIOGRAM TRACING: CPT

## 2021-02-11 PROCEDURE — 63600175 PHARM REV CODE 636 W HCPCS: Performed by: EMERGENCY MEDICINE

## 2021-02-11 PROCEDURE — 25500020 PHARM REV CODE 255: Performed by: EMERGENCY MEDICINE

## 2021-02-11 PROCEDURE — 86140 C-REACTIVE PROTEIN: CPT

## 2021-02-11 PROCEDURE — 93010 ELECTROCARDIOGRAM REPORT: CPT | Mod: ,,, | Performed by: INTERNAL MEDICINE

## 2021-02-11 RX ORDER — DEXAMETHASONE SODIUM PHOSPHATE 4 MG/ML
8 INJECTION, SOLUTION INTRA-ARTICULAR; INTRALESIONAL; INTRAMUSCULAR; INTRAVENOUS; SOFT TISSUE
Status: COMPLETED | OUTPATIENT
Start: 2021-02-11 | End: 2021-02-11

## 2021-02-11 RX ORDER — ACETAMINOPHEN 325 MG/1
650 TABLET ORAL
Status: COMPLETED | OUTPATIENT
Start: 2021-02-11 | End: 2021-02-11

## 2021-02-11 RX ORDER — AZITHROMYCIN 250 MG/1
250 TABLET, FILM COATED ORAL DAILY
Qty: 6 TABLET | Refills: 0 | Status: SHIPPED | OUTPATIENT
Start: 2021-02-11 | End: 2021-02-22

## 2021-02-11 RX ORDER — HYDROCODONE BITARTRATE AND ACETAMINOPHEN 5; 325 MG/1; MG/1
1 TABLET ORAL
Status: COMPLETED | OUTPATIENT
Start: 2021-02-11 | End: 2021-02-11

## 2021-02-11 RX ADMIN — IOHEXOL 100 ML: 350 INJECTION, SOLUTION INTRAVENOUS at 10:02

## 2021-02-11 RX ADMIN — DEXAMETHASONE SODIUM PHOSPHATE 8 MG: 4 INJECTION INTRA-ARTICULAR; INTRALESIONAL; INTRAMUSCULAR; INTRAVENOUS; SOFT TISSUE at 09:02

## 2021-02-11 RX ADMIN — HYDROCODONE BITARTRATE AND ACETAMINOPHEN 1 TABLET: 5; 325 TABLET ORAL at 11:02

## 2021-02-11 RX ADMIN — ACETAMINOPHEN 650 MG: 325 TABLET ORAL at 09:02

## 2021-02-12 ENCOUNTER — OFFICE VISIT (OUTPATIENT)
Dept: INTERNAL MEDICINE | Facility: CLINIC | Age: 36
End: 2021-02-12
Payer: COMMERCIAL

## 2021-02-12 DIAGNOSIS — U07.1 COVID-19: Primary | ICD-10-CM

## 2021-02-12 LAB — FERRITIN SERPL-MCNC: 467 NG/ML (ref 20–300)

## 2021-02-12 PROCEDURE — 99213 OFFICE O/P EST LOW 20 MIN: CPT | Mod: 95,,, | Performed by: FAMILY MEDICINE

## 2021-02-12 PROCEDURE — 99213 PR OFFICE/OUTPT VISIT, EST, LEVL III, 20-29 MIN: ICD-10-PCS | Mod: 95,,, | Performed by: FAMILY MEDICINE

## 2021-02-12 RX ORDER — ALBUTEROL SULFATE 90 UG/1
2 AEROSOL, METERED RESPIRATORY (INHALATION) EVERY 4 HOURS PRN
Qty: 18 G | Refills: 2 | Status: SHIPPED | OUTPATIENT
Start: 2021-02-12 | End: 2021-07-20

## 2021-02-14 ENCOUNTER — TELEPHONE (OUTPATIENT)
Dept: PSYCHIATRY | Facility: CLINIC | Age: 36
End: 2021-02-14

## 2021-02-22 ENCOUNTER — APPOINTMENT (OUTPATIENT)
Dept: RADIOLOGY | Facility: HOSPITAL | Age: 36
End: 2021-02-22
Attending: FAMILY MEDICINE
Payer: COMMERCIAL

## 2021-02-22 ENCOUNTER — OFFICE VISIT (OUTPATIENT)
Dept: INTERNAL MEDICINE | Facility: CLINIC | Age: 36
End: 2021-02-22
Payer: COMMERCIAL

## 2021-02-22 VITALS
DIASTOLIC BLOOD PRESSURE: 78 MMHG | BODY MASS INDEX: 32.42 KG/M2 | SYSTOLIC BLOOD PRESSURE: 120 MMHG | WEIGHT: 226.44 LBS | HEIGHT: 70 IN | TEMPERATURE: 99 F

## 2021-02-22 DIAGNOSIS — U07.1 COVID-19: Primary | ICD-10-CM

## 2021-02-22 DIAGNOSIS — U07.1 COVID-19: ICD-10-CM

## 2021-02-22 PROCEDURE — 71046 X-RAY EXAM CHEST 2 VIEWS: CPT | Mod: TC,PO

## 2021-02-22 PROCEDURE — 99999 PR PBB SHADOW E&M-EST. PATIENT-LVL III: CPT | Mod: PBBFAC,,, | Performed by: FAMILY MEDICINE

## 2021-02-22 PROCEDURE — 1125F AMNT PAIN NOTED PAIN PRSNT: CPT | Mod: S$GLB,,, | Performed by: FAMILY MEDICINE

## 2021-02-22 PROCEDURE — 1125F PR PAIN SEVERITY QUANTIFIED, PAIN PRESENT: ICD-10-PCS | Mod: S$GLB,,, | Performed by: FAMILY MEDICINE

## 2021-02-22 PROCEDURE — 3008F BODY MASS INDEX DOCD: CPT | Mod: CPTII,S$GLB,, | Performed by: FAMILY MEDICINE

## 2021-02-22 PROCEDURE — 71046 X-RAY EXAM CHEST 2 VIEWS: CPT | Mod: 26,,, | Performed by: RADIOLOGY

## 2021-02-22 PROCEDURE — 99213 OFFICE O/P EST LOW 20 MIN: CPT | Mod: S$GLB,,, | Performed by: FAMILY MEDICINE

## 2021-02-22 PROCEDURE — 3008F PR BODY MASS INDEX (BMI) DOCUMENTED: ICD-10-PCS | Mod: CPTII,S$GLB,, | Performed by: FAMILY MEDICINE

## 2021-02-22 PROCEDURE — 99213 PR OFFICE/OUTPT VISIT, EST, LEVL III, 20-29 MIN: ICD-10-PCS | Mod: S$GLB,,, | Performed by: FAMILY MEDICINE

## 2021-02-22 PROCEDURE — 99999 PR PBB SHADOW E&M-EST. PATIENT-LVL III: ICD-10-PCS | Mod: PBBFAC,,, | Performed by: FAMILY MEDICINE

## 2021-02-22 PROCEDURE — 71046 XR CHEST PA AND LATERAL: ICD-10-PCS | Mod: 26,,, | Performed by: RADIOLOGY

## 2021-03-03 ENCOUNTER — OFFICE VISIT (OUTPATIENT)
Dept: PSYCHIATRY | Facility: CLINIC | Age: 36
End: 2021-03-03
Payer: COMMERCIAL

## 2021-03-03 DIAGNOSIS — F41.1 GENERALIZED ANXIETY DISORDER WITH PANIC ATTACKS: Primary | ICD-10-CM

## 2021-03-03 DIAGNOSIS — Z86.59 HISTORY OF POSTTRAUMATIC STRESS DISORDER (PTSD): ICD-10-CM

## 2021-03-03 DIAGNOSIS — F41.0 GENERALIZED ANXIETY DISORDER WITH PANIC ATTACKS: Primary | ICD-10-CM

## 2021-03-03 PROCEDURE — 90832 PR PSYCHOTHERAPY W/PATIENT, 30 MIN: ICD-10-PCS | Mod: 95,,, | Performed by: SOCIAL WORKER

## 2021-03-03 PROCEDURE — 90832 PSYTX W PT 30 MINUTES: CPT | Mod: 95,,, | Performed by: SOCIAL WORKER

## 2021-03-11 ENCOUNTER — PATIENT MESSAGE (OUTPATIENT)
Dept: PSYCHIATRY | Facility: CLINIC | Age: 36
End: 2021-03-11

## 2021-03-15 ENCOUNTER — LAB VISIT (OUTPATIENT)
Dept: LAB | Facility: HOSPITAL | Age: 36
End: 2021-03-15
Attending: UROLOGY
Payer: COMMERCIAL

## 2021-03-15 DIAGNOSIS — R68.82 DECREASED LIBIDO: ICD-10-CM

## 2021-03-15 PROCEDURE — 36415 COLL VENOUS BLD VENIPUNCTURE: CPT | Mod: PO | Performed by: UROLOGY

## 2021-03-15 PROCEDURE — 84403 ASSAY OF TOTAL TESTOSTERONE: CPT | Performed by: UROLOGY

## 2021-03-17 ENCOUNTER — OFFICE VISIT (OUTPATIENT)
Dept: PSYCHIATRY | Facility: CLINIC | Age: 36
End: 2021-03-17
Payer: COMMERCIAL

## 2021-03-17 DIAGNOSIS — F41.1 GENERALIZED ANXIETY DISORDER WITH PANIC ATTACKS: Primary | ICD-10-CM

## 2021-03-17 DIAGNOSIS — F41.0 GENERALIZED ANXIETY DISORDER WITH PANIC ATTACKS: Primary | ICD-10-CM

## 2021-03-17 PROCEDURE — 90837 PSYTX W PT 60 MINUTES: CPT | Mod: 95,,, | Performed by: SOCIAL WORKER

## 2021-03-17 PROCEDURE — 90837 PR PSYCHOTHERAPY W/PATIENT, 60 MIN: ICD-10-PCS | Mod: 95,,, | Performed by: SOCIAL WORKER

## 2021-03-22 LAB
ALBUMIN SERPL-MCNC: 4 G/DL (ref 3.6–5.1)
SHBG SERPL-SCNC: 17 NMOL/L (ref 10–50)
TESTOST FREE SERPL-MCNC: 79.8 PG/ML (ref 46–224)
TESTOST SERPL-MCNC: 359 NG/DL (ref 250–1100)
TESTOSTERONE.FREE+WB SERPL-MCNC: 146.8 NG/DL (ref 110–575)

## 2021-03-24 ENCOUNTER — OFFICE VISIT (OUTPATIENT)
Dept: PSYCHIATRY | Facility: CLINIC | Age: 36
End: 2021-03-24
Payer: COMMERCIAL

## 2021-03-24 DIAGNOSIS — F41.1 GENERALIZED ANXIETY DISORDER WITH PANIC ATTACKS: Primary | ICD-10-CM

## 2021-03-24 DIAGNOSIS — Z86.59 HISTORY OF POSTTRAUMATIC STRESS DISORDER (PTSD): ICD-10-CM

## 2021-03-24 DIAGNOSIS — F41.0 GENERALIZED ANXIETY DISORDER WITH PANIC ATTACKS: Primary | ICD-10-CM

## 2021-03-24 PROCEDURE — 90834 PR PSYCHOTHERAPY W/PATIENT, 45 MIN: ICD-10-PCS | Mod: 95,,, | Performed by: SOCIAL WORKER

## 2021-03-24 PROCEDURE — 90834 PSYTX W PT 45 MINUTES: CPT | Mod: 95,,, | Performed by: SOCIAL WORKER

## 2021-03-29 ENCOUNTER — OFFICE VISIT (OUTPATIENT)
Dept: PSYCHIATRY | Facility: CLINIC | Age: 36
End: 2021-03-29
Payer: COMMERCIAL

## 2021-03-29 DIAGNOSIS — F41.0 GENERALIZED ANXIETY DISORDER WITH PANIC ATTACKS: Primary | ICD-10-CM

## 2021-03-29 DIAGNOSIS — F41.1 GENERALIZED ANXIETY DISORDER WITH PANIC ATTACKS: Primary | ICD-10-CM

## 2021-03-29 DIAGNOSIS — Z86.59 HISTORY OF POSTTRAUMATIC STRESS DISORDER (PTSD): ICD-10-CM

## 2021-03-29 PROCEDURE — 90834 PR PSYCHOTHERAPY W/PATIENT, 45 MIN: ICD-10-PCS | Mod: 95,,, | Performed by: SOCIAL WORKER

## 2021-03-29 PROCEDURE — 90834 PSYTX W PT 45 MINUTES: CPT | Mod: 95,,, | Performed by: SOCIAL WORKER

## 2021-04-04 ENCOUNTER — NURSE TRIAGE (OUTPATIENT)
Dept: ADMINISTRATIVE | Facility: CLINIC | Age: 36
End: 2021-04-04

## 2021-04-05 ENCOUNTER — HOSPITAL ENCOUNTER (OUTPATIENT)
Dept: RADIOLOGY | Facility: HOSPITAL | Age: 36
Discharge: HOME | End: 2021-04-05
Attending: PODIATRIST
Payer: COMMERCIAL

## 2021-04-05 ENCOUNTER — OFFICE VISIT (OUTPATIENT)
Dept: PODIATRY | Facility: CLINIC | Age: 36
End: 2021-04-05
Payer: COMMERCIAL

## 2021-04-05 DIAGNOSIS — R26.89 PAINFUL GAIT: ICD-10-CM

## 2021-04-05 DIAGNOSIS — M79.671 RIGHT FOOT PAIN: Primary | ICD-10-CM

## 2021-04-05 DIAGNOSIS — S93.401A INVERSION SPRAIN OF RIGHT ANKLE, INITIAL ENCOUNTER: Primary | ICD-10-CM

## 2021-04-05 DIAGNOSIS — M79.671 RIGHT FOOT PAIN: ICD-10-CM

## 2021-04-05 DIAGNOSIS — M25.571 PAIN IN JOINT INVOLVING RIGHT ANKLE AND FOOT: ICD-10-CM

## 2021-04-05 DIAGNOSIS — M25.571 ACUTE RIGHT ANKLE PAIN: ICD-10-CM

## 2021-04-05 DIAGNOSIS — R52 PAINFUL GAIT: ICD-10-CM

## 2021-04-05 PROCEDURE — 99214 PR OFFICE/OUTPT VISIT, EST, LEVL IV, 30-39 MIN: ICD-10-PCS | Mod: S$GLB,,, | Performed by: PODIATRIST

## 2021-04-05 PROCEDURE — 73630 XR FOOT COMPLETE 3 VIEW RIGHT: ICD-10-PCS | Mod: 26,RT,, | Performed by: RADIOLOGY

## 2021-04-05 PROCEDURE — 99999 PR PBB SHADOW E&M-EST. PATIENT-LVL II: CPT | Mod: PBBFAC,,, | Performed by: PODIATRIST

## 2021-04-05 PROCEDURE — 73610 XR ANKLE COMPLETE 3 VIEW RIGHT: ICD-10-PCS | Mod: 26,RT,, | Performed by: RADIOLOGY

## 2021-04-05 PROCEDURE — 99214 OFFICE O/P EST MOD 30 MIN: CPT | Mod: S$GLB,,, | Performed by: PODIATRIST

## 2021-04-05 PROCEDURE — 1125F PR PAIN SEVERITY QUANTIFIED, PAIN PRESENT: ICD-10-PCS | Mod: S$GLB,,, | Performed by: PODIATRIST

## 2021-04-05 PROCEDURE — 73610 X-RAY EXAM OF ANKLE: CPT | Mod: 26,RT,, | Performed by: RADIOLOGY

## 2021-04-05 PROCEDURE — 1125F AMNT PAIN NOTED PAIN PRSNT: CPT | Mod: S$GLB,,, | Performed by: PODIATRIST

## 2021-04-05 PROCEDURE — 73630 X-RAY EXAM OF FOOT: CPT | Mod: TC,RT

## 2021-04-05 PROCEDURE — 73610 X-RAY EXAM OF ANKLE: CPT | Mod: TC,RT

## 2021-04-05 PROCEDURE — 73630 X-RAY EXAM OF FOOT: CPT | Mod: 26,RT,, | Performed by: RADIOLOGY

## 2021-04-05 PROCEDURE — 99999 PR PBB SHADOW E&M-EST. PATIENT-LVL II: ICD-10-PCS | Mod: PBBFAC,,, | Performed by: PODIATRIST

## 2021-04-05 RX ORDER — TRAMADOL HYDROCHLORIDE 50 MG/1
50 TABLET ORAL EVERY 12 HOURS PRN
Qty: 10 TABLET | Refills: 0 | Status: SHIPPED | OUTPATIENT
Start: 2021-04-05 | End: 2021-07-20

## 2021-04-05 RX ORDER — IBUPROFEN 800 MG/1
800 TABLET ORAL 2 TIMES DAILY PRN
Qty: 60 TABLET | Refills: 0 | Status: SHIPPED | OUTPATIENT
Start: 2021-04-05 | End: 2021-05-05

## 2021-04-16 ENCOUNTER — OFFICE VISIT (OUTPATIENT)
Dept: PSYCHIATRY | Facility: CLINIC | Age: 36
End: 2021-04-16
Payer: COMMERCIAL

## 2021-04-16 ENCOUNTER — PATIENT MESSAGE (OUTPATIENT)
Dept: PSYCHIATRY | Facility: CLINIC | Age: 36
End: 2021-04-16

## 2021-04-16 DIAGNOSIS — F41.1 GENERALIZED ANXIETY DISORDER WITH PANIC ATTACKS: Primary | ICD-10-CM

## 2021-04-16 DIAGNOSIS — F41.0 GENERALIZED ANXIETY DISORDER WITH PANIC ATTACKS: Primary | ICD-10-CM

## 2021-04-16 DIAGNOSIS — Z86.59 HISTORY OF POSTTRAUMATIC STRESS DISORDER (PTSD): ICD-10-CM

## 2021-04-16 PROCEDURE — 99214 PR OFFICE/OUTPT VISIT, EST, LEVL IV, 30-39 MIN: ICD-10-PCS | Mod: 95,,, | Performed by: PSYCHIATRY & NEUROLOGY

## 2021-04-16 PROCEDURE — 99214 OFFICE O/P EST MOD 30 MIN: CPT | Mod: 95,,, | Performed by: PSYCHIATRY & NEUROLOGY

## 2021-04-19 ENCOUNTER — TELEPHONE (OUTPATIENT)
Dept: UROLOGY | Facility: CLINIC | Age: 36
End: 2021-04-19

## 2021-04-19 ENCOUNTER — OFFICE VISIT (OUTPATIENT)
Dept: PSYCHIATRY | Facility: CLINIC | Age: 36
End: 2021-04-19
Payer: COMMERCIAL

## 2021-04-19 DIAGNOSIS — F41.1 GENERALIZED ANXIETY DISORDER WITH PANIC ATTACKS: Primary | ICD-10-CM

## 2021-04-19 DIAGNOSIS — Z86.59 HISTORY OF POSTTRAUMATIC STRESS DISORDER (PTSD): ICD-10-CM

## 2021-04-19 DIAGNOSIS — F41.0 GENERALIZED ANXIETY DISORDER WITH PANIC ATTACKS: Primary | ICD-10-CM

## 2021-04-19 PROCEDURE — 90834 PR PSYCHOTHERAPY W/PATIENT, 45 MIN: ICD-10-PCS | Mod: 95,,, | Performed by: SOCIAL WORKER

## 2021-04-19 PROCEDURE — 90834 PSYTX W PT 45 MINUTES: CPT | Mod: 95,,, | Performed by: SOCIAL WORKER

## 2021-04-26 ENCOUNTER — PATIENT MESSAGE (OUTPATIENT)
Dept: PSYCHIATRY | Facility: CLINIC | Age: 36
End: 2021-04-26

## 2021-04-28 ENCOUNTER — PATIENT MESSAGE (OUTPATIENT)
Dept: RESEARCH | Facility: HOSPITAL | Age: 36
End: 2021-04-28

## 2021-05-03 ENCOUNTER — PATIENT MESSAGE (OUTPATIENT)
Dept: PSYCHIATRY | Facility: CLINIC | Age: 36
End: 2021-05-03

## 2021-05-03 ENCOUNTER — OFFICE VISIT (OUTPATIENT)
Dept: PSYCHIATRY | Facility: CLINIC | Age: 36
End: 2021-05-03
Payer: COMMERCIAL

## 2021-05-03 DIAGNOSIS — Z86.59 HISTORY OF POSTTRAUMATIC STRESS DISORDER (PTSD): ICD-10-CM

## 2021-05-03 DIAGNOSIS — F41.0 GENERALIZED ANXIETY DISORDER WITH PANIC ATTACKS: Primary | ICD-10-CM

## 2021-05-03 DIAGNOSIS — F41.1 GENERALIZED ANXIETY DISORDER WITH PANIC ATTACKS: Primary | ICD-10-CM

## 2021-05-03 PROCEDURE — 90834 PR PSYCHOTHERAPY W/PATIENT, 45 MIN: ICD-10-PCS | Mod: 95,,, | Performed by: SOCIAL WORKER

## 2021-05-03 PROCEDURE — 90834 PSYTX W PT 45 MINUTES: CPT | Mod: 95,,, | Performed by: SOCIAL WORKER

## 2021-05-12 RX ORDER — ALPRAZOLAM 0.5 MG/1
0.5 TABLET ORAL 2 TIMES DAILY PRN
Qty: 20 TABLET | Refills: 3 | Status: SHIPPED | OUTPATIENT
Start: 2021-05-12 | End: 2021-07-07

## 2021-05-14 ENCOUNTER — PATIENT MESSAGE (OUTPATIENT)
Dept: INTERNAL MEDICINE | Facility: CLINIC | Age: 36
End: 2021-05-14

## 2021-06-14 ENCOUNTER — HOSPITAL ENCOUNTER (OUTPATIENT)
Dept: RADIOLOGY | Facility: HOSPITAL | Age: 36
Discharge: HOME | End: 2021-06-14
Attending: PODIATRIST
Payer: COMMERCIAL

## 2021-06-14 DIAGNOSIS — M25.571 PAIN IN JOINT INVOLVING RIGHT ANKLE AND FOOT: ICD-10-CM

## 2021-06-14 DIAGNOSIS — R52 PAINFUL GAIT: ICD-10-CM

## 2021-06-14 DIAGNOSIS — R26.89 PAINFUL GAIT: ICD-10-CM

## 2021-06-14 DIAGNOSIS — S93.401A INVERSION SPRAIN OF RIGHT ANKLE, INITIAL ENCOUNTER: ICD-10-CM

## 2021-06-14 DIAGNOSIS — M25.571 ACUTE RIGHT ANKLE PAIN: ICD-10-CM

## 2021-06-14 PROCEDURE — A9585 GADOBUTROL INJECTION: HCPCS | Mod: PO | Performed by: PODIATRIST

## 2021-06-14 PROCEDURE — 73723 MRI JOINT LWR EXTR W/O&W/DYE: CPT | Mod: TC,PO,RT

## 2021-06-14 PROCEDURE — 73723 MRI JOINT LWR EXTR W/O&W/DYE: CPT | Mod: 26,RT,, | Performed by: RADIOLOGY

## 2021-06-14 PROCEDURE — 73723 MRI ANKLE W WO CONTRAST RIGHT: ICD-10-PCS | Mod: 26,RT,, | Performed by: RADIOLOGY

## 2021-06-14 PROCEDURE — 25500020 PHARM REV CODE 255: Mod: PO | Performed by: PODIATRIST

## 2021-06-14 RX ORDER — GADOBUTROL 604.72 MG/ML
10 INJECTION INTRAVENOUS
Status: COMPLETED | OUTPATIENT
Start: 2021-06-14 | End: 2021-06-14

## 2021-06-14 RX ADMIN — GADOBUTROL 10 ML: 604.72 INJECTION INTRAVENOUS at 02:06

## 2021-06-16 ENCOUNTER — PATIENT MESSAGE (OUTPATIENT)
Dept: PODIATRY | Facility: CLINIC | Age: 36
End: 2021-06-16

## 2021-07-07 RX ORDER — ALPRAZOLAM 0.5 MG/1
0.5 TABLET ORAL 2 TIMES DAILY PRN
Qty: 30 TABLET | Refills: 3 | Status: SHIPPED | OUTPATIENT
Start: 2021-07-07 | End: 2021-09-27 | Stop reason: SDUPTHER

## 2021-07-20 ENCOUNTER — HOSPITAL ENCOUNTER (OUTPATIENT)
Dept: RADIOLOGY | Facility: HOSPITAL | Age: 36
Discharge: HOME | End: 2021-07-20
Attending: FAMILY MEDICINE
Payer: COMMERCIAL

## 2021-07-20 ENCOUNTER — OFFICE VISIT (OUTPATIENT)
Dept: INTERNAL MEDICINE | Facility: CLINIC | Age: 36
End: 2021-07-20
Payer: COMMERCIAL

## 2021-07-20 VITALS
OXYGEN SATURATION: 98 % | TEMPERATURE: 98 F | WEIGHT: 223.56 LBS | SYSTOLIC BLOOD PRESSURE: 130 MMHG | HEART RATE: 62 BPM | BODY MASS INDEX: 32.08 KG/M2 | DIASTOLIC BLOOD PRESSURE: 78 MMHG

## 2021-07-20 DIAGNOSIS — Z86.16 HISTORY OF COVID-19: ICD-10-CM

## 2021-07-20 DIAGNOSIS — R05.3 CHRONIC COUGH: Primary | ICD-10-CM

## 2021-07-20 DIAGNOSIS — R05.3 CHRONIC COUGH: ICD-10-CM

## 2021-07-20 PROCEDURE — 1126F AMNT PAIN NOTED NONE PRSNT: CPT | Mod: CPTII,S$GLB,, | Performed by: FAMILY MEDICINE

## 2021-07-20 PROCEDURE — 1126F PR PAIN SEVERITY QUANTIFIED, NO PAIN PRESENT: ICD-10-PCS | Mod: CPTII,S$GLB,, | Performed by: FAMILY MEDICINE

## 2021-07-20 PROCEDURE — 3008F BODY MASS INDEX DOCD: CPT | Mod: CPTII,S$GLB,, | Performed by: FAMILY MEDICINE

## 2021-07-20 PROCEDURE — 71046 XR CHEST PA AND LATERAL: ICD-10-PCS | Mod: 26,,, | Performed by: RADIOLOGY

## 2021-07-20 PROCEDURE — 71046 X-RAY EXAM CHEST 2 VIEWS: CPT | Mod: TC,FY,PO

## 2021-07-20 PROCEDURE — 99213 PR OFFICE/OUTPT VISIT, EST, LEVL III, 20-29 MIN: ICD-10-PCS | Mod: S$GLB,,, | Performed by: FAMILY MEDICINE

## 2021-07-20 PROCEDURE — 99213 OFFICE O/P EST LOW 20 MIN: CPT | Mod: S$GLB,,, | Performed by: FAMILY MEDICINE

## 2021-07-20 PROCEDURE — 99999 PR PBB SHADOW E&M-EST. PATIENT-LVL III: ICD-10-PCS | Mod: PBBFAC,,, | Performed by: FAMILY MEDICINE

## 2021-07-20 PROCEDURE — 71046 X-RAY EXAM CHEST 2 VIEWS: CPT | Mod: 26,,, | Performed by: RADIOLOGY

## 2021-07-20 PROCEDURE — 99999 PR PBB SHADOW E&M-EST. PATIENT-LVL III: CPT | Mod: PBBFAC,,, | Performed by: FAMILY MEDICINE

## 2021-07-20 PROCEDURE — 3008F PR BODY MASS INDEX (BMI) DOCUMENTED: ICD-10-PCS | Mod: CPTII,S$GLB,, | Performed by: FAMILY MEDICINE

## 2021-07-20 RX ORDER — BENZONATATE 200 MG/1
200 CAPSULE ORAL 3 TIMES DAILY PRN
Qty: 20 CAPSULE | Refills: 1 | Status: SHIPPED | OUTPATIENT
Start: 2021-07-20 | End: 2021-07-30

## 2021-07-20 RX ORDER — AZELASTINE 1 MG/ML
1 SPRAY, METERED NASAL 2 TIMES DAILY
Qty: 30 ML | Refills: 1 | Status: SHIPPED | OUTPATIENT
Start: 2021-07-20 | End: 2021-12-16

## 2021-07-20 RX ORDER — OMEPRAZOLE 20 MG/1
20 CAPSULE, DELAYED RELEASE ORAL DAILY
Qty: 30 CAPSULE | Refills: 11 | Status: SHIPPED | OUTPATIENT
Start: 2021-07-20 | End: 2021-12-16

## 2021-09-30 ENCOUNTER — OFFICE VISIT (OUTPATIENT)
Dept: INTERNAL MEDICINE | Facility: CLINIC | Age: 36
End: 2021-09-30
Payer: COMMERCIAL

## 2021-09-30 DIAGNOSIS — J32.9 SINUSITIS, UNSPECIFIED CHRONICITY, UNSPECIFIED LOCATION: Primary | ICD-10-CM

## 2021-09-30 PROCEDURE — 99213 OFFICE O/P EST LOW 20 MIN: CPT | Mod: 95,,, | Performed by: FAMILY MEDICINE

## 2021-09-30 PROCEDURE — 99213 PR OFFICE/OUTPT VISIT, EST, LEVL III, 20-29 MIN: ICD-10-PCS | Mod: 95,,, | Performed by: FAMILY MEDICINE

## 2021-10-04 ENCOUNTER — OFFICE VISIT (OUTPATIENT)
Dept: OTOLARYNGOLOGY | Facility: CLINIC | Age: 36
End: 2021-10-04
Payer: COMMERCIAL

## 2021-10-04 VITALS — BODY MASS INDEX: 30.9 KG/M2 | TEMPERATURE: 98 F | WEIGHT: 215.81 LBS | HEIGHT: 70 IN

## 2021-10-04 DIAGNOSIS — J32.9 SINUSITIS, UNSPECIFIED CHRONICITY, UNSPECIFIED LOCATION: ICD-10-CM

## 2021-10-04 DIAGNOSIS — J32.0 CHRONIC MAXILLARY SINUSITIS: ICD-10-CM

## 2021-10-04 PROCEDURE — 3008F BODY MASS INDEX DOCD: CPT | Mod: CPTII,S$GLB,, | Performed by: PHYSICIAN ASSISTANT

## 2021-10-04 PROCEDURE — 1159F MED LIST DOCD IN RCRD: CPT | Mod: CPTII,S$GLB,, | Performed by: PHYSICIAN ASSISTANT

## 2021-10-04 PROCEDURE — 1159F PR MEDICATION LIST DOCUMENTED IN MEDICAL RECORD: ICD-10-PCS | Mod: CPTII,S$GLB,, | Performed by: PHYSICIAN ASSISTANT

## 2021-10-04 PROCEDURE — 3008F PR BODY MASS INDEX (BMI) DOCUMENTED: ICD-10-PCS | Mod: CPTII,S$GLB,, | Performed by: PHYSICIAN ASSISTANT

## 2021-10-04 PROCEDURE — 99999 PR PBB SHADOW E&M-EST. PATIENT-LVL III: CPT | Mod: PBBFAC,,, | Performed by: PHYSICIAN ASSISTANT

## 2021-10-04 PROCEDURE — 99999 PR PBB SHADOW E&M-EST. PATIENT-LVL III: ICD-10-PCS | Mod: PBBFAC,,, | Performed by: PHYSICIAN ASSISTANT

## 2021-10-04 PROCEDURE — 99204 PR OFFICE/OUTPT VISIT, NEW, LEVL IV, 45-59 MIN: ICD-10-PCS | Mod: S$GLB,,, | Performed by: PHYSICIAN ASSISTANT

## 2021-10-04 PROCEDURE — 99204 OFFICE O/P NEW MOD 45 MIN: CPT | Mod: S$GLB,,, | Performed by: PHYSICIAN ASSISTANT

## 2021-10-04 RX ORDER — AZELASTINE 1 MG/ML
1 SPRAY, METERED NASAL 2 TIMES DAILY
COMMUNITY
End: 2021-12-16

## 2021-10-04 RX ORDER — FLUTICASONE PROPIONATE 50 MCG
SPRAY, SUSPENSION (ML) NASAL
COMMUNITY
Start: 2021-09-10 | End: 2021-12-16

## 2021-10-04 RX ORDER — CEFDINIR 300 MG/1
300 CAPSULE ORAL EVERY 12 HOURS
COMMUNITY
Start: 2021-09-24 | End: 2021-12-16

## 2021-10-22 RX ORDER — ALPRAZOLAM 0.5 MG/1
0.5 TABLET ORAL 2 TIMES DAILY PRN
Qty: 30 TABLET | Refills: 1 | Status: SHIPPED | OUTPATIENT
Start: 2021-10-22 | End: 2022-01-28

## 2021-11-29 ENCOUNTER — OFFICE VISIT (OUTPATIENT)
Dept: PSYCHIATRY | Facility: CLINIC | Age: 36
End: 2021-11-29
Payer: COMMERCIAL

## 2021-11-29 DIAGNOSIS — F41.0 GENERALIZED ANXIETY DISORDER WITH PANIC ATTACKS: Primary | ICD-10-CM

## 2021-11-29 DIAGNOSIS — F41.1 GENERALIZED ANXIETY DISORDER WITH PANIC ATTACKS: Primary | ICD-10-CM

## 2021-11-29 PROCEDURE — 99999 PR PBB SHADOW E&M-EST. PATIENT-LVL I: CPT | Mod: PBBFAC,,, | Performed by: PSYCHIATRY & NEUROLOGY

## 2021-11-29 PROCEDURE — 99999 PR PBB SHADOW E&M-EST. PATIENT-LVL I: ICD-10-PCS | Mod: PBBFAC,,, | Performed by: PSYCHIATRY & NEUROLOGY

## 2021-11-29 PROCEDURE — 99214 OFFICE O/P EST MOD 30 MIN: CPT | Mod: S$GLB,,, | Performed by: PSYCHIATRY & NEUROLOGY

## 2021-11-29 PROCEDURE — 99214 PR OFFICE/OUTPT VISIT, EST, LEVL IV, 30-39 MIN: ICD-10-PCS | Mod: S$GLB,,, | Performed by: PSYCHIATRY & NEUROLOGY

## 2021-11-29 RX ORDER — CLONAZEPAM 0.5 MG/1
TABLET ORAL
Qty: 60 TABLET | Refills: 2 | Status: SHIPPED | OUTPATIENT
Start: 2021-11-29 | End: 2022-01-28 | Stop reason: SDUPTHER

## 2021-11-29 RX ORDER — FLUOXETINE 10 MG/1
CAPSULE ORAL
Qty: 60 CAPSULE | Refills: 2 | Status: SHIPPED | OUTPATIENT
Start: 2021-11-29 | End: 2022-05-20 | Stop reason: SDUPTHER

## 2021-12-07 ENCOUNTER — PATIENT MESSAGE (OUTPATIENT)
Dept: PSYCHIATRY | Facility: CLINIC | Age: 36
End: 2021-12-07

## 2021-12-10 ENCOUNTER — TELEPHONE (OUTPATIENT)
Dept: PSYCHIATRY | Facility: CLINIC | Age: 36
End: 2021-12-10
Payer: COMMERCIAL

## 2021-12-12 ENCOUNTER — HOSPITAL ENCOUNTER (EMERGENCY)
Facility: HOSPITAL | Age: 36
Discharge: HOME | End: 2021-12-12
Attending: EMERGENCY MEDICINE
Payer: COMMERCIAL

## 2021-12-12 VITALS
DIASTOLIC BLOOD PRESSURE: 82 MMHG | WEIGHT: 216 LBS | SYSTOLIC BLOOD PRESSURE: 140 MMHG | TEMPERATURE: 98 F | HEART RATE: 72 BPM | RESPIRATION RATE: 17 BRPM | OXYGEN SATURATION: 99 % | BODY MASS INDEX: 30.99 KG/M2

## 2021-12-12 DIAGNOSIS — N50.819 TESTALGIA: ICD-10-CM

## 2021-12-12 DIAGNOSIS — N45.3 EPIDIDYMO-ORCHITIS: Primary | ICD-10-CM

## 2021-12-12 LAB
BILIRUB UR QL STRIP: NEGATIVE
CLARITY UR: CLEAR
COLOR UR: YELLOW
GLUCOSE UR QL STRIP: NEGATIVE
HGB UR QL STRIP: NEGATIVE
KETONES UR QL STRIP: NEGATIVE
LEUKOCYTE ESTERASE UR QL STRIP: NEGATIVE
NITRITE UR QL STRIP: NEGATIVE
PH UR STRIP: 7 [PH] (ref 5–8)
PROT UR QL STRIP: NEGATIVE
SP GR UR STRIP: 1.02 (ref 1–1.03)
URN SPEC COLLECT METH UR: NORMAL
UROBILINOGEN UR STRIP-ACNC: NEGATIVE EU/DL

## 2021-12-12 PROCEDURE — 87591 N.GONORRHOEAE DNA AMP PROB: CPT | Performed by: EMERGENCY MEDICINE

## 2021-12-12 PROCEDURE — 63700000 PHARM REV CODE 250 ALT 637 W/O HCPCS: Performed by: EMERGENCY MEDICINE

## 2021-12-12 PROCEDURE — 96372 THER/PROPH/DIAG INJ SC/IM: CPT

## 2021-12-12 PROCEDURE — 87491 CHLMYD TRACH DNA AMP PROBE: CPT | Performed by: EMERGENCY MEDICINE

## 2021-12-12 PROCEDURE — 25000003 PHARM REV CODE 250: Performed by: EMERGENCY MEDICINE

## 2021-12-12 PROCEDURE — 99285 EMERGENCY DEPT VISIT HI MDM: CPT | Mod: 25

## 2021-12-12 PROCEDURE — 63600175 PHARM REV CODE 636 W HCPCS: Performed by: EMERGENCY MEDICINE

## 2021-12-12 PROCEDURE — 81003 URINALYSIS AUTO W/O SCOPE: CPT | Performed by: EMERGENCY MEDICINE

## 2021-12-12 RX ORDER — KETOROLAC TROMETHAMINE 10 MG/1
10 TABLET, FILM COATED ORAL
Status: COMPLETED | OUTPATIENT
Start: 2021-12-12 | End: 2021-12-12

## 2021-12-12 RX ORDER — LIDOCAINE HYDROCHLORIDE 10 MG/ML
INJECTION, SOLUTION EPIDURAL; INFILTRATION; INTRACAUDAL; PERINEURAL
Status: DISCONTINUED
Start: 2021-12-12 | End: 2021-12-12 | Stop reason: HOSPADM

## 2021-12-12 RX ORDER — AZITHROMYCIN 250 MG/1
1000 TABLET, FILM COATED ORAL
Status: COMPLETED | OUTPATIENT
Start: 2021-12-12 | End: 2021-12-12

## 2021-12-12 RX ORDER — KETOROLAC TROMETHAMINE 10 MG/1
10 TABLET, FILM COATED ORAL EVERY 8 HOURS PRN
Qty: 9 TABLET | Refills: 0 | Status: SHIPPED | OUTPATIENT
Start: 2021-12-12 | End: 2022-07-20

## 2021-12-12 RX ORDER — DOXYCYCLINE 100 MG/1
100 CAPSULE ORAL 2 TIMES DAILY
Qty: 20 CAPSULE | Refills: 0 | Status: SHIPPED | OUTPATIENT
Start: 2021-12-12 | End: 2021-12-22

## 2021-12-12 RX ORDER — CEFTRIAXONE 500 MG/1
500 INJECTION, POWDER, FOR SOLUTION INTRAMUSCULAR; INTRAVENOUS
Status: COMPLETED | OUTPATIENT
Start: 2021-12-12 | End: 2021-12-12

## 2021-12-12 RX ADMIN — AZITHROMYCIN MONOHYDRATE 1000 MG: 250 TABLET ORAL at 09:12

## 2021-12-12 RX ADMIN — CEFTRIAXONE SODIUM 500 MG: 500 INJECTION, POWDER, FOR SOLUTION INTRAMUSCULAR; INTRAVENOUS at 09:12

## 2021-12-12 RX ADMIN — KETOROLAC TROMETHAMINE 10 MG: 10 TABLET, FILM COATED ORAL at 08:12

## 2021-12-14 ENCOUNTER — PATIENT MESSAGE (OUTPATIENT)
Dept: PSYCHIATRY | Facility: CLINIC | Age: 36
End: 2021-12-14

## 2021-12-14 LAB
C TRACH DNA SPEC QL NAA+PROBE: NOT DETECTED
N GONORRHOEA DNA SPEC QL NAA+PROBE: NOT DETECTED

## 2021-12-16 ENCOUNTER — OFFICE VISIT (OUTPATIENT)
Dept: INTERNAL MEDICINE | Facility: CLINIC | Age: 36
End: 2021-12-16
Payer: COMMERCIAL

## 2021-12-16 VITALS
OXYGEN SATURATION: 99 % | HEIGHT: 70 IN | SYSTOLIC BLOOD PRESSURE: 132 MMHG | DIASTOLIC BLOOD PRESSURE: 86 MMHG | HEART RATE: 77 BPM | BODY MASS INDEX: 30.68 KG/M2 | TEMPERATURE: 98 F | WEIGHT: 214.31 LBS

## 2021-12-16 DIAGNOSIS — N45.3 EPIDIDYMO-ORCHITIS: Primary | ICD-10-CM

## 2021-12-16 DIAGNOSIS — L91.8 ACROCHORDON: ICD-10-CM

## 2021-12-16 PROCEDURE — 99999 PR PBB SHADOW E&M-EST. PATIENT-LVL IV: ICD-10-PCS | Mod: PBBFAC,,, | Performed by: FAMILY MEDICINE

## 2021-12-16 PROCEDURE — 99213 OFFICE O/P EST LOW 20 MIN: CPT | Mod: S$GLB,,, | Performed by: FAMILY MEDICINE

## 2021-12-16 PROCEDURE — 99213 PR OFFICE/OUTPT VISIT, EST, LEVL III, 20-29 MIN: ICD-10-PCS | Mod: S$GLB,,, | Performed by: FAMILY MEDICINE

## 2021-12-16 PROCEDURE — 99999 PR PBB SHADOW E&M-EST. PATIENT-LVL IV: CPT | Mod: PBBFAC,,, | Performed by: FAMILY MEDICINE

## 2021-12-21 ENCOUNTER — OFFICE VISIT (OUTPATIENT)
Dept: UROLOGY | Facility: CLINIC | Age: 36
End: 2021-12-21
Payer: COMMERCIAL

## 2021-12-21 VITALS
SYSTOLIC BLOOD PRESSURE: 131 MMHG | WEIGHT: 214.31 LBS | DIASTOLIC BLOOD PRESSURE: 82 MMHG | BODY MASS INDEX: 30.68 KG/M2 | HEART RATE: 74 BPM | HEIGHT: 70 IN

## 2021-12-21 DIAGNOSIS — N45.3 EPIDIDYMO-ORCHITIS: ICD-10-CM

## 2021-12-21 LAB
BILIRUB SERPL-MCNC: NORMAL MG/DL
BLOOD URINE, POC: NORMAL
CLARITY, POC UA: NORMAL
COLOR, POC UA: YELLOW
GLUCOSE UR QL STRIP: NORMAL
KETONES UR QL STRIP: NORMAL
LEUKOCYTE ESTERASE URINE, POC: NORMAL
NITRITE, POC UA: NORMAL
PH, POC UA: 7
PROTEIN, POC: NORMAL
SPECIFIC GRAVITY, POC UA: 1.01
UROBILINOGEN, POC UA: NORMAL

## 2021-12-21 PROCEDURE — 81002 POCT URINE DIPSTICK WITHOUT MICROSCOPE: ICD-10-PCS | Mod: S$GLB,,, | Performed by: NURSE PRACTITIONER

## 2021-12-21 PROCEDURE — 99215 PR OFFICE/OUTPT VISIT, EST, LEVL V, 40-54 MIN: ICD-10-PCS | Mod: 25,S$GLB,, | Performed by: NURSE PRACTITIONER

## 2021-12-21 PROCEDURE — 99999 PR PBB SHADOW E&M-EST. PATIENT-LVL III: CPT | Mod: PBBFAC,,, | Performed by: NURSE PRACTITIONER

## 2021-12-21 PROCEDURE — 81002 URINALYSIS NONAUTO W/O SCOPE: CPT | Mod: S$GLB,,, | Performed by: NURSE PRACTITIONER

## 2021-12-21 PROCEDURE — 99215 OFFICE O/P EST HI 40 MIN: CPT | Mod: 25,S$GLB,, | Performed by: NURSE PRACTITIONER

## 2021-12-21 PROCEDURE — 99999 PR PBB SHADOW E&M-EST. PATIENT-LVL III: ICD-10-PCS | Mod: PBBFAC,,, | Performed by: NURSE PRACTITIONER

## 2021-12-21 RX ORDER — SULFAMETHOXAZOLE AND TRIMETHOPRIM 800; 160 MG/1; MG/1
1 TABLET ORAL 2 TIMES DAILY
Qty: 20 TABLET | Refills: 0 | Status: SHIPPED | OUTPATIENT
Start: 2021-12-21 | End: 2021-12-31

## 2022-01-11 ENCOUNTER — TELEPHONE (OUTPATIENT)
Dept: UROLOGY | Facility: CLINIC | Age: 37
End: 2022-01-11
Payer: COMMERCIAL

## 2022-01-11 NOTE — TELEPHONE ENCOUNTER
I called this patient for a reassessment after completing antibiotics for prostatitis.  Patient states that he has a complete resolved and has been asymptomatic for days.  Therefore, patient does not have to keep his upcoming appointment today.  Will contact clinic with any recurrent symptoms.

## 2022-01-13 ENCOUNTER — OFFICE VISIT (OUTPATIENT)
Dept: DERMATOLOGY | Facility: CLINIC | Age: 37
End: 2022-01-13
Payer: COMMERCIAL

## 2022-01-13 DIAGNOSIS — D18.01 CHERRY ANGIOMA: ICD-10-CM

## 2022-01-13 DIAGNOSIS — L72.0 MILIA: ICD-10-CM

## 2022-01-13 DIAGNOSIS — L91.8 ACROCHORDON: Primary | ICD-10-CM

## 2022-01-13 DIAGNOSIS — R22.9 SUBCUTANEOUS NODULE: ICD-10-CM

## 2022-01-13 DIAGNOSIS — D22.9 MULTIPLE BENIGN NEVI: ICD-10-CM

## 2022-01-13 PROCEDURE — 1160F RVW MEDS BY RX/DR IN RCRD: CPT | Mod: CPTII,S$GLB,, | Performed by: DERMATOLOGY

## 2022-01-13 PROCEDURE — 99203 OFFICE O/P NEW LOW 30 MIN: CPT | Mod: S$GLB,,, | Performed by: DERMATOLOGY

## 2022-01-13 PROCEDURE — 99999 PR PBB SHADOW E&M-EST. PATIENT-LVL III: ICD-10-PCS | Mod: PBBFAC,,, | Performed by: DERMATOLOGY

## 2022-01-13 PROCEDURE — 1159F PR MEDICATION LIST DOCUMENTED IN MEDICAL RECORD: ICD-10-PCS | Mod: CPTII,S$GLB,, | Performed by: DERMATOLOGY

## 2022-01-13 PROCEDURE — 1159F MED LIST DOCD IN RCRD: CPT | Mod: CPTII,S$GLB,, | Performed by: DERMATOLOGY

## 2022-01-13 PROCEDURE — 1160F PR REVIEW ALL MEDS BY PRESCRIBER/CLIN PHARMACIST DOCUMENTED: ICD-10-PCS | Mod: CPTII,S$GLB,, | Performed by: DERMATOLOGY

## 2022-01-13 PROCEDURE — 99999 PR PBB SHADOW E&M-EST. PATIENT-LVL III: CPT | Mod: PBBFAC,,, | Performed by: DERMATOLOGY

## 2022-01-13 PROCEDURE — 99203 PR OFFICE/OUTPT VISIT, NEW, LEVL III, 30-44 MIN: ICD-10-PCS | Mod: S$GLB,,, | Performed by: DERMATOLOGY

## 2022-01-13 NOTE — PATIENT INSTRUCTIONS
Shave Biopsy Wound Care    Your doctor has performed a shave biopsy today.  A band aid and vaseline ointment has been placed over the site.  This should remain in place for 24 hours.  It is recommended that you keep the area dry for the first 24 hours.  After 24 hours, you may remove the band aid and wash the area with warm soap and water and apply Vaseline jelly.  Many patients prefer to use Neosporin or Bacitracin ointment.  This is acceptable; however, know that you can develop an allergy to this medication even if you have used it safely for years.  It is important to keep the area moist.  Letting it dry out and get air slows healing time, and will worsen the scar.  Band aid is optional after first 24 hours.      If you notice increasing redness, tenderness, pain, or yellow drainage at the biopsy site, please notify your doctor.  These are signs of an infection.    If your biopsy site is bleeding, apply firm pressure for 15 minutes straight.  Repeat for another 15 minutes, if it is still bleeding.   If the surgical site continues to bleed, then please contact your doctor.      [facility] [place] [address]/ [phone] FAX/ [email or web address]

## 2022-01-13 NOTE — PROGRESS NOTES
"  Subjective:       Patient ID:  Rahul Farrar is a 36 y.o. male who presents for   History of Present Illness: The patient presents with chief complaint of skin tag.  Location: L groin  Duration: years  Signs/Symptoms: growing    Prior treatments: none      Denies personal h/o skin cancer  Denies 1st degree relative with skin cancer.        Patient complains of lesion(s)  Location: scalp  Duration: years  Symptoms: possibly slowly growing; no pain, drainage  Relieving factors/Previous treatments: none      Patient complains of lesion(s):moles  Location: back  Duration: unsure  Symptoms: none  Relieving factors/Previous treatments: none      Patient complains of lesion(s): red spot  Location: chest  Duration: a couple months  Symptoms: none  Relieving factors/Previous treatments: none    Patient complains of lesion(s)  Location: cheeks, around eyes  Duration: intermittently x years  Symptoms: tiny white bumps that he extracts himself and hard tiny white "seeds" come out; none present today  Relieving factors/Previous treatments: none            Review of Systems   Skin: Positive for activity-related sunscreen use. Negative for daily sunscreen use and recent sunburn.        Objective:    Physical Exam   Constitutional: He appears well-developed and well-nourished. No distress.   Neurological: He is alert and oriented to person, place, and time. He is not disoriented.   Psychiatric: He has a normal mood and affect.   Skin:   Areas Examined (abnormalities noted in diagram):   Scalp / Hair Palpated and Inspected  Head / Face Inspection Performed  Chest / Axilla Inspection Performed  Genitals / Buttocks / Groin Inspection Performed  Back Inspection Performed                   Diagram Legend     Erythematous scaling macule/papule c/w actinic keratosis       Vascular papule c/w angioma      Pigmented verrucoid papule/plaque c/w seborrheic keratosis      Yellow umbilicated papule c/w sebaceous hyperplasia      " Irregularly shaped tan macule c/w lentigo     1-2 mm smooth white papules consistent with Milia      Movable subcutaneous cyst with punctum c/w epidermal inclusion cyst      Subcutaneous movable cyst c/w pilar cyst      Firm pink to brown papule c/w dermatofibroma      Pedunculated fleshy papule(s) c/w skin tag(s)      Evenly pigmented macule c/w junctional nevus     Mildly variegated pigmented, slightly irregular-bordered macule c/w mildly atypical nevus      Flesh colored to evenly pigmented papule c/w intradermal nevus       Pink pearly papule/plaque c/w basal cell carcinoma      Erythematous hyperkeratotic cursted plaque c/w SCC      Surgical scar with no sign of skin cancer recurrence      Open and closed comedones      Inflammatory papules and pustules      Verrucoid papule consistent consistent with wart     Erythematous eczematous patches and plaques     Dystrophic onycholytic nail with subungual debris c/w onychomycosis     Umbilicated papule    Erythematous-base heme-crusted tan verrucoid plaque consistent with inflamed seborrheic keratosis     Erythematous Silvery Scaling Plaque c/w Psoriasis     See annotation      Assessment / Plan:        Acrochordon    After risks, benefits and alternatives explained, including blistering, pain, and dyschromia, and verbal consent was obtained, lesion treated with cryotherapy.  Patient tolerated cryotherapy well.  Verbal and written wound care instructions were given.       Multiple benign nevi  Discussed ABCDE's of nevi.  Monitor for new mole or moles that are becoming bigger, darker, irritated, or developing irregular borders. Brochure provided.  Patient instructed in importance in daily sun protection of at least spf 30. Mineral sunscreen ingredients preferred (Zinc +/- Titanium).   Patient encouraged to wear hat for all outdoor exposure.   Also discussed sun avoidance and use of protective clothing.    Conte angioma  This is a benign vascular lesion. Reassurance  given. No treatment required.     History of milia  - not present on exam today, but based on history  - can try OTC retinol    Subcutaneous nodule  - suspect pilar cyst. Present, stable, and asymptomatic for many years per pt. Discussed suspected etiologies and likely benign nature of lesion, but that I cannot exclude malignancy with 100% certainty unless it is excised and sent for pathologic exam. Offered excision, patient declines at this time. Continue to monitor. He will message if/when he decides for procedure scheduling           Follow up if symptoms worsen or fail to improve.        LOS NUMBER AND COMPLEXITY OF PROBLEMS    COMPLEXITY OF DATA RISK TOTAL TIME (m)   17230  26819 [] 1 self-limited or minor problem [x] Minimal to none [] No treatment recommended or patient to monitor. Reassurance.  15-29  10-19   04700  05365 Low  [x] 2 or more self limited or minor problems  [] 1 stable chronic illness  [] 1 acute, uncomplicated illness or injury Limited (2)  [] Prior external notes from each unique source  [] Review result of each unique test  [] Order each unique test  OR [] Independent historian Low  [x]  OTC medications   []  Discussed/Decision for minor skin surgery (no risk factors) 30-44  20-29   35706  63831 Moderate  []  1 or more chronic unstable illness (not at goal or progression or exacerbation) or SE of treatment  []  2 or more stable chronic illnesses  []  1 acute illness with systemic symptoms  []  1 acute complicated injury  []  1 undiagnosed new problem with uncertain prognosis Moderate (1/3 below)  []  3 or more data items        *Now includes independent historian  []  Independent interpretation of a test  []  Discuss management/test with another provider Moderate  []  Prescription drug mgmt  []  Discussed/Decision for Minor surgery with risk factors  []  Mgmt limited by social determinates 45-59  30-39   51236  30529 High  []  1 or more chronic illness with severe exacerbation, progression  or SE of treatment  []  1 acute or chronic illness/injury that poses a threat to life or bodily function Extensive (2/3 below)  []  3 or more data items        *Now includes independent historian.  []  Independent interpretation of a test  []  Discuss management/test with another provider High  []  Major surgery with risk discussed  []  Drug therapy requiring intensive monitoring for toxicity  []  Hospitalization  []  Decision for DNR 60-74  40-54

## 2022-01-27 ENCOUNTER — TELEPHONE (OUTPATIENT)
Dept: PSYCHIATRY | Facility: CLINIC | Age: 37
End: 2022-01-27
Payer: COMMERCIAL

## 2022-01-28 ENCOUNTER — OFFICE VISIT (OUTPATIENT)
Dept: PSYCHIATRY | Facility: CLINIC | Age: 37
End: 2022-01-28
Payer: COMMERCIAL

## 2022-01-28 DIAGNOSIS — F41.1 GENERALIZED ANXIETY DISORDER WITH PANIC ATTACKS: Primary | ICD-10-CM

## 2022-01-28 DIAGNOSIS — F41.0 GENERALIZED ANXIETY DISORDER WITH PANIC ATTACKS: Primary | ICD-10-CM

## 2022-01-28 PROCEDURE — 99999 PR PBB SHADOW E&M-EST. PATIENT-LVL II: ICD-10-PCS | Mod: PBBFAC,,, | Performed by: PSYCHIATRY & NEUROLOGY

## 2022-01-28 PROCEDURE — 99999 PR PBB SHADOW E&M-EST. PATIENT-LVL II: CPT | Mod: PBBFAC,,, | Performed by: PSYCHIATRY & NEUROLOGY

## 2022-01-28 PROCEDURE — 90833 PR PSYCHOTHERAPY W/PATIENT W/E&M, 30 MIN (ADD ON): ICD-10-PCS | Mod: S$GLB,,, | Performed by: PSYCHIATRY & NEUROLOGY

## 2022-01-28 PROCEDURE — 99213 OFFICE O/P EST LOW 20 MIN: CPT | Mod: S$GLB,,, | Performed by: PSYCHIATRY & NEUROLOGY

## 2022-01-28 PROCEDURE — 90833 PSYTX W PT W E/M 30 MIN: CPT | Mod: S$GLB,,, | Performed by: PSYCHIATRY & NEUROLOGY

## 2022-01-28 PROCEDURE — 99213 PR OFFICE/OUTPT VISIT, EST, LEVL III, 20-29 MIN: ICD-10-PCS | Mod: S$GLB,,, | Performed by: PSYCHIATRY & NEUROLOGY

## 2022-01-28 RX ORDER — CLONAZEPAM 0.5 MG/1
TABLET ORAL
Qty: 60 TABLET | Refills: 2 | Status: SHIPPED | OUTPATIENT
Start: 2022-01-28 | End: 2022-05-20 | Stop reason: SDUPTHER

## 2022-01-28 NOTE — PROGRESS NOTES
"Outpatient Psychiatry Follow-Up Visit (MD/NP)    1/28/2022      Clinical Status of Patient:  Outpatient (Ambulatory)    Chief Complaint:  Rahul Farrar is a 36 y.o. male who presents today for follow-up of depression and anxiety.  Met with patient.      Interval History and Content of Current Session:  Interim Events/Subjective Report/Content of Current Session: Patient seen and interviewed for follow-up. Clonazepam 1/2 and 1/2 - anxiety relief lasting longer, more effective than previously with short-acting benzos. Denies side effects. Hasn't taken fluoxetine, fearing side effects. No new health complaints, diagnoses, or new medications for other conditions. No new symptoms or stressors.     Background: Patient is a 36 year old man who presents for psychiatric follow-up, previously a patient of Johanna Boucher, no longer at [facility]. Started seeing Dr. MARADIAGA in August of '20, last saw her in April of this year. Saw Sharda Harmno in therapy from Feb. To May of this year.     Complains of problematic anxiety including intense intermittent anxiety attacks, precipitated by overworry, catastrophizing and thinking of "worst case scenarios". Intense worry focused on his infant choking. Also has intense fear of flying. Realizes anxiety is disproportionate to dangers.   Xanax has been helpful. Trying to get off over time. Sees a therapist for couples counseling. Anxiety about flying.   Buspirone didn't help  Hydroxyzine  Duloxetine    Remote: seroquel, escitalopram, ambien; following dad's suicide. Gained weight. Stopped meds, did ok.     Last visit: Struggling with anxiety. He is worried about his daughter choking and is unable to let go of the thought. White coat syndrome: bp goes up    He is really resistant about having medications.     No suicidal ideation   No homicidal ideation      GAD7 1/28/2022 5/3/2021 4/19/2021   1. Feeling nervous, anxious, or on edge? 1 1 1   2. Not being able to stop or control worrying? 1 1 " "2   3. Worrying too much about different things? 1 1 2   4. Trouble relaxing? 1 1 2   5. Being so restless that it is hard to sit still? 0 2 2   6. Becoming easily annoyed or irritable? 0 2 0   7. Feeling afraid as if something awful might happen? 1 1 2   WENDIE-7 Score 5 9 11          35 yo M who was referred by Dr. Long, anxiety. Currently Xanax .5 mg twice a day. He states that he doesn't take one. She had prescribed it originally. He has tried different medications: buspirone treid it for 3 weeks then prescribed an anti-depressant: They tried different types of medications, the psychiatrist tried lexapro, seroquel, ambien,      had a baby, found out wife was pregnant October daughter born, new job, & had a lot of anxiety, hard time to get things done. Had a lot on his plate, that was stressful, and he had anxiety about being a dad. Dad has depression     Step dad committed suicide when Rahul was 21 yo, he had a DWI & mom & him were mad at him, Dad texted him. They switched medicines a lot, he was prescribed seroquel & ambien at the same time. Was on lexapro. A lot of problems with sleep, he nightmares, sleep depressed. Mom "checked out". He went to a grief counselor. He was on too much medication: later tapered off of the medications by another doctor who also provided counseling, & he felt that he developed the coping skills to deal with anxiety & grief     His symptoms of anxiety, with anxiety attacks were reactivated when he became anxious about being a father. The anxiety worsened when his wife had an emergency  & their daughter had complications of jaundice.           Reviewed the following     GAD7 5/3/2021 2021 2021   1. Feeling nervous, anxious, or on edge? 1 1 1   2. Not being able to stop or control worrying? 1 2 1   3. Worrying too much about different things? 1 2 1   4. Trouble relaxing? 1 2 1   5. Being so restless that it is hard to sit still? 2 2 2   6. Becoming easily " annoyed or irritable? 2 0 2   7. Feeling afraid as if something awful might happen? 1 2 1   WENDIE-7 Score 9 11 9     After the panic attacks, he was doing a lot of things, but his energy levels were down due to feeling overwhelmed.   So it was more avoidant behavior he has an interest in doing things, but he was avoiding thinking too far     MDQ Scale 3/29/2021   you felt so good or so hyper that other people thought you were not your normal self or you were so hyper that you got into trouble? 0   you were so irritable that you shouted at people or started fights or arguments? 0   you felt much more self-confident than usual? 0   you got much less sleep than usual and found that you didn't really miss it? 0   you were more talkative or spoke much faster than usual? 0   thoughts raced through your head or you couldn't slow your mind down? 0   you were so easily distracted by things around you that you had trouble concentrating or staying on track? 0   you had more energy than usual? 0   you were much more active or did many more things than usual? 0   you were much more social or outgoing than usual, for example, you telephoned friends in the middle of the night? 0   you were much more interested in sex than usual? 0   you did things that were unusual for you or that other people might have thought were excessive, foolish, or risky? 0   spending money got you or your family in trouble? 0   If you checked YES to more than one of the above, have several of these ever happened during the same period of time? 0   How much of a problem did any of these cause you - like being unable to work; having family, money or legal troubles; getting into arguments or fights? No problems   Mood Disorder Questionnaire Score  0     Psychiatric history: has participated in counseling/psychotherapy on an outpatient basis in the past    Medical history: none  No medical history       Family history of psychiatric illness:   Mom suffers from  depression     Social history (marriage, employment, etc.):   Step dad  from suicide attempt  Dad hit by a drunk  and has brain damage  He worked as a lead manager  Currently  has one daughter    Substance Abuse History:  Does have a history of drinking, he started drinking again a glass of wine a year ago   Now he drinks a glass of wine before he gets on a plane  Had a history of DWI  Does use tobacco, dip    Impression:     No diagnosis found.    Referral to therapist for treatment of anxiety   Will begin to taper off of xanax once patient is established in therapy    Strengths and Liabilities: Strength: Patient accepts guidance/feedback, Strength: Patient is expressive/articulate., Strength: Patient is motivated for change., Liability: Patient lacks coping skills.    Treatment Goals:  Specify outcomes written in observable, behavioral terms:   Anxiety: reducing negative automatic thoughts, reducing physical symptoms of anxiety and reducing time spent worrying (<30 minutes/day)    Treatment Plan/Recommendations:   · Medication Management: Continue current medications. The risks and benefits of medication were discussed with the patient.    Review of Systems   · PSYCHIATRIC: Pertinant items are noted in the narrative.    Past Medical, Family and Social History: The patient's past medical, family and social history have been reviewed and updated as appropriate within the electronic medical record - see encounter notes.    Compliance: yes    Side effects: None    Risk Parameters:  Patient reports no suicidal ideation  Patient reports no homicidal ideation  Patient reports no self-injurious behavior  Patient reports no violent behavior    Exam (detailed: at least 9 elements; comprehensive: all 15 elements)   Constitutional  Vitals:  Most recent vital signs, dated greater than 90 days prior to this appointment, were reviewed.   Last 3 sets of Vitals    Vitals - 1 value per visit 2021  1/13/2022   SYSTOLIC - 131 -   DIASTOLIC - 82 -   Pulse - 74 -   Temp - - -   Resp - - -   SPO2 - - -   Weight (lb) - 214.29 -   Weight (kg) - 97.2 -   Height - 70 -   BMI (Calculated) - 30.7 -   VISIT REPORT - - -   Pain Score  2 - 0          General:  unremarkable, age appropriate     Musculoskeletal  Muscle Strength/Tone:  not examined   Gait & Station:  non-ataxic     Psychiatric  Speech:  no latency; no press   Mood & Affect:  anxious  congruent and appropriate   Thought Process:  normal and logical   Associations:  intact   Thought Content:  suicidal thoughts: (active-no, passive-no), homicidal thoughts: (active-no, passive-no)   Insight:  has awareness of illness   Judgement: behavior is adequate to circumstances   Orientation:  grossly intact   Memory: intact for content of interview   Language: grossly intact   Attention Span & Concentration:  able to focus   Fund of Knowledge:  intact and appropriate to age and level of education     Assessment and Diagnosis   Status/Progress: Based on the examination today, the patient's problem(s) is/are adequately but not ideally controlled.  New problems have not been presented today.   social stressors are complicating management of the primary condition.  There are no active rule-out diagnoses for this patient at this time.     General Impression:     Dx: WENDIE    Intervention/Counseling/Treatment Plan   · Medication Management: The risks and benefits of medication were discussed with the patient.    Fluoxetine 10 mg daily  Alprazolam prn  Psychotherapy today. psychoeducation, motivational interviewing.   Psychotherapy referral.     Return to Clinic: 6 weeks

## 2022-02-07 ENCOUNTER — OFFICE VISIT (OUTPATIENT)
Dept: PSYCHIATRY | Facility: CLINIC | Age: 37
End: 2022-02-07
Payer: COMMERCIAL

## 2022-02-07 DIAGNOSIS — Z86.59 HISTORY OF POSTTRAUMATIC STRESS DISORDER (PTSD): ICD-10-CM

## 2022-02-07 DIAGNOSIS — F41.0 GENERALIZED ANXIETY DISORDER WITH PANIC ATTACKS: Primary | ICD-10-CM

## 2022-02-07 DIAGNOSIS — F41.1 GENERALIZED ANXIETY DISORDER WITH PANIC ATTACKS: Primary | ICD-10-CM

## 2022-02-07 PROCEDURE — 90834 PSYTX W PT 45 MINUTES: CPT | Mod: 95,,, | Performed by: SOCIAL WORKER

## 2022-02-07 PROCEDURE — 90834 PR PSYCHOTHERAPY W/PATIENT, 45 MIN: ICD-10-PCS | Mod: 95,,, | Performed by: SOCIAL WORKER

## 2022-02-07 NOTE — PROGRESS NOTES
"Individual Psychotherapy (PhD/LCSW)    2/7/2022    Site:  Telemed         Each patient to whom she provides medical services by telemedicine is:  (1) informed of the relationship between the physician and patient and the respective role of any other health care provider with respect to management of the patient; and (2) notified that he or she may decline to receive medical services by telemedicine and may withdraw from such care at any time.     Virtual visit with synchronous audio and video    Pt calling from his home in [place], [place]    Therapeutic Intervention: Met with patient.  Outpatient - Insight oriented psychotherapy 45 min - CPT code 03572    Chief complaint/reason for encounter: anxiety     Interval history and content of current session: Pt has not been to therapy in 9 months.  He reports that in the wake of Dr. Boucher leaving he has been seeing Dr. DILL who encouraged him to return to therapy.  Dr. DILL has stopped Xanax and changed another benzo.  He has encouraged patient to switch to SSRI but patient remains resistive as he feels like he was once before on too many meds.  Challenged his logic as he initially agreed to benzos just to get through anxiety periods and now he has been taking scheduled doses for over a year.  Discussed the reinforcing nature of being on benzo's and how they can actually reinforce the anxiety.  Pt still having trouble understanding why parenting for the first time has triggered so much anxiety for him.  Trying to break down his belief that there is a "perfect" parents and how unrealistic that is and sets him up for feelings of failure as a dad.  Working on staying in the moment and enjoying the moments he has with her.      Treatment plan:  · Target symptoms: anxiety   · Why chosen therapy is appropriate versus another modality: patient responds to this modality, evidence based practice  · Outcome monitoring methods: self-report, observation  · Therapeutic intervention " type: insight oriented psychotherapy, behavior modifying psychotherapy    Risk parameters:  Patient reports no suicidal ideation  Patient reports no homicidal ideation  Patient reports no self-injurious behavior  Patient reports no violent behavior    Verbal deficits: None    Patient's response to intervention:  The patient's response to intervention is accepting.    Progress toward goals and other mental status changes:  The patient's progress toward goals is not progressing.    Diagnosis:     ICD-10-CM ICD-9-CM   1. Generalized anxiety disorder with panic attacks  F41.1 300.02    F41.0 300.01   2. History of posttraumatic stress disorder (PTSD)  Z86.59 V11.8       Plan:  individual psychotherapy    Return to clinic: as needed    Length of Service (minutes): 45     Sharda Harmon   02/07/2022   4:58 PM

## 2022-03-28 ENCOUNTER — OFFICE VISIT (OUTPATIENT)
Dept: PSYCHIATRY | Facility: CLINIC | Age: 37
End: 2022-03-28
Payer: COMMERCIAL

## 2022-03-28 DIAGNOSIS — F41.1 GENERALIZED ANXIETY DISORDER WITH PANIC ATTACKS: Primary | ICD-10-CM

## 2022-03-28 DIAGNOSIS — F41.0 GENERALIZED ANXIETY DISORDER WITH PANIC ATTACKS: Primary | ICD-10-CM

## 2022-03-28 PROCEDURE — 99214 OFFICE O/P EST MOD 30 MIN: CPT | Mod: S$GLB,,, | Performed by: PSYCHIATRY & NEUROLOGY

## 2022-03-28 PROCEDURE — 99999 PR PBB SHADOW E&M-EST. PATIENT-LVL I: CPT | Mod: PBBFAC,,, | Performed by: PSYCHIATRY & NEUROLOGY

## 2022-03-28 PROCEDURE — 99999 PR PBB SHADOW E&M-EST. PATIENT-LVL I: ICD-10-PCS | Mod: PBBFAC,,, | Performed by: PSYCHIATRY & NEUROLOGY

## 2022-03-28 PROCEDURE — 99214 PR OFFICE/OUTPT VISIT, EST, LEVL IV, 30-39 MIN: ICD-10-PCS | Mod: S$GLB,,, | Performed by: PSYCHIATRY & NEUROLOGY

## 2022-03-28 RX ORDER — FLUOXETINE 10 MG/1
30 CAPSULE ORAL DAILY
Qty: 90 CAPSULE | Refills: 2 | Status: SHIPPED | OUTPATIENT
Start: 2022-03-28 | End: 2022-08-04 | Stop reason: SDUPTHER

## 2022-03-28 RX ORDER — CLONAZEPAM 0.5 MG/1
0.5 TABLET ORAL 3 TIMES DAILY PRN
Qty: 90 TABLET | Refills: 2 | Status: SHIPPED | OUTPATIENT
Start: 2022-03-28 | End: 2022-08-04 | Stop reason: SDUPTHER

## 2022-03-28 RX ORDER — CLONAZEPAM 0.5 MG/1
0.5 TABLET ORAL 2 TIMES DAILY PRN
Qty: 90 TABLET | Refills: 2 | Status: SHIPPED | OUTPATIENT
Start: 2022-03-28 | End: 2022-03-28 | Stop reason: SDUPTHER

## 2022-03-28 NOTE — PROGRESS NOTES
"Outpatient Psychiatry Follow-Up Visit (MD/NP)    3/28/2022      Clinical Status of Patient:  Outpatient (Ambulatory)    Chief Complaint:  Rahul Farrar is a 36 y.o. male who presents today for follow-up of depression and anxiety.  Met with patient.      Interval History and Content of Current Session:  Interim Events/Subjective Report/Content of Current Session: Patient seen and interviewed for follow-up, last seen about 2 months previously. Has attended initial psychotherapy visit. Reports good rapport. Symptoms ongoing, reduced from baseline. Gets good effect from clonazepam. Adherent to fluoxetine, tolerating it ok. No new health complaints, diagnoses, or new medications for other conditions. No new symptoms or stressors.     Background: Patient is a 36 year old man who presents for psychiatric follow-up, previously a patient of Johanna Boucher, no longer at [facility]. Started seeing Dr. MARADIAGA in August of '20, last saw her in April of this year. Saw Sharda Harmon in therapy from Feb. To May of this year.     Complains of problematic anxiety including intense intermittent anxiety attacks, precipitated by overworry, catastrophizing and thinking of "worst case scenarios". Intense worry focused on his infant choking. Also has intense fear of flying. Realizes anxiety is disproportionate to dangers.   Xanax has been helpful. Trying to get off over time. Sees a therapist for couples counseling. Anxiety about flying.   Buspirone didn't help  Hydroxyzine  Duloxetine    Remote: seroquel, escitalopram, ambien; following dad's suicide. Gained weight. Stopped meds, did ok.     Last visit: Struggling with anxiety. He is worried about his daughter choking and is unable to let go of the thought. White coat syndrome: bp goes up    He is really resistant about having medications.     No suicidal ideation   No homicidal ideation      GAD7 2/7/2022 1/28/2022 5/3/2021   1. Feeling nervous, anxious, or on edge? 1 1 1   2. Not being " "able to stop or control worrying? 1 1 1   3. Worrying too much about different things? 1 1 1   4. Trouble relaxing? 1 1 1   5. Being so restless that it is hard to sit still? 0 0 2   6. Becoming easily annoyed or irritable? 0 0 2   7. Feeling afraid as if something awful might happen? 1 1 1   WENDIE-7 Score 5 5 9          35 yo M who was referred by Dr. Long, anxiety. Currently Xanax .5 mg twice a day. He states that he doesn't take one. She had prescribed it originally. He has tried different medications: buspirone treid it for 3 weeks then prescribed an anti-depressant: They tried different types of medications, the psychiatrist tried lexapro, seroquel, ambien,      had a baby, found out wife was pregnant October daughter born, new job, & had a lot of anxiety, hard time to get things done. Had a lot on his plate, that was stressful, and he had anxiety about being a dad. Dad has depression     Step dad committed suicide when Rahul was 21 yo, he had a DWI & mom & him were mad at him, Dad texted him. They switched medicines a lot, he was prescribed seroquel & ambien at the same time. Was on lexapro. A lot of problems with sleep, he nightmares, sleep depressed. Mom "checked out". He went to a grief counselor. He was on too much medication: later tapered off of the medications by another doctor who also provided counseling, & he felt that he developed the coping skills to deal with anxiety & grief     His symptoms of anxiety, with anxiety attacks were reactivated when he became anxious about being a father. The anxiety worsened when his wife had an emergency  & their daughter had complications of jaundice.     Reviewed the following: GAD7 - 5.3.21 (9), 4.19.21 (11) (see notes for specific items):   After the panic attacks, he was doing a lot of things, but his energy levels were down due to feeling overwhelmed.   So it was more avoidant behavior he has an interest in doing things, but he was avoiding " thinking too far     MDQ - 3.29.21 - 0    Psychiatric history: has participated in counseling/psychotherapy on an outpatient basis in the past    Medical history: none  No medical history     Family history of psychiatric illness:   Mom suffers from depression     Social history: Step dad  from suicide attempt  Dad hit by a drunk  & has brain damage  He worked as a lead manager  Currently  has one daughter    Substance Abuse History:  Does have a history of drinking, he started drinking again a glass of wine a year ago   Now he drinks a glass of wine before he gets on a plane  Had a history of DWI  Does use tobacco, dip    Impression:     No diagnosis found.    Referral to therapist for treatment of anxiety   Will begin to taper off of xanax once patient is established in therapy    Strengths and Liabilities: Strength: Patient accepts guidance/feedback, Strength: Patient is expressive/articulate., Strength: Patient is motivated for change., Liability: Patient lacks coping skills.    Treatment Goals:  Specify outcomes written in observable, behavioral terms:   Anxiety: reducing negative automatic thoughts, reducing physical symptoms of anxiety and reducing time spent worrying (<30 minutes/day)    Treatment Plan/Recommendations:   · Medication Management: Continue current medications. The risks and benefits of medication were discussed with the patient.    Review of Systems   · PSYCHIATRIC: Pertinant items are noted in the narrative.    Past Medical, Family and Social History: The patient's past medical, family and social history have been reviewed and updated as appropriate within the electronic medical record - see encounter notes.    Compliance: yes    Side effects: None    Risk Parameters:  Patient reports no suicidal ideation  Patient reports no homicidal ideation  Patient reports no self-injurious behavior  Patient reports no violent behavior    Exam (detailed: at least 9 elements;  comprehensive: all 15 elements)   Constitutional  Vitals:  Most recent vital signs, dated greater than 90 days prior to this appointment, were reviewed.   Last 3 sets of Vitals    Vitals - 1 value per visit 12/21/2021 12/21/2021 1/13/2022   SYSTOLIC - 131 -   DIASTOLIC - 82 -   Pulse - 74 -   Temp - - -   Resp - - -   SPO2 - - -   Weight (lb) - 214.29 -   Weight (kg) - 97.2 -   Height - 70 -   BMI (Calculated) - 30.7 -   VISIT REPORT - - -   Pain Score  2 - 0          General:  unremarkable, age appropriate     Musculoskeletal  Muscle Strength/Tone:  not examined   Gait & Station:  non-ataxic     Psychiatric  Speech:  no latency; no press   Mood & Affect:  anxious  congruent and appropriate   Thought Process:  normal and logical   Associations:  intact   Thought Content:  suicidal thoughts: (active-no, passive-no), homicidal thoughts: (active-no, passive-no)   Insight:  has awareness of illness   Judgement: behavior is adequate to circumstances   Orientation:  grossly intact   Memory: intact for content of interview   Language: grossly intact   Attention Span & Concentration:  able to focus   Fund of Knowledge:  intact and appropriate to age and level of education     Assessment and Diagnosis   Status/Progress: Based on the examination today, the patient's problem(s) is/are adequately but not ideally controlled.  New problems have not been presented today.   social stressors are complicating management of the primary condition.  There are no active rule-out diagnoses for this patient at this time.     General Impression:     Dx: WENDIE    Intervention/Counseling/Treatment Plan   · Medication Management: The risks and benefits of medication were discussed with the patient.    Fluoxetine to 30 mg daily as tolerated.  Clonazepam   Psychotherapy today. psychoeducation, motivational interviewing.   Psychotherapy referral.     Return to Clinic: 6 weeks

## 2022-04-30 ENCOUNTER — PATIENT OUTREACH (OUTPATIENT)
Dept: ADMINISTRATIVE | Facility: OTHER | Age: 37
End: 2022-04-30
Payer: COMMERCIAL

## 2022-05-01 NOTE — PROGRESS NOTES
Health Maintenance Due   Topic Date Due    COVID-19 Vaccine (3 - Booster for Pfizer series) 12/02/2021     Updates were requested from care everywhere.  Chart was reviewed for overdue Proactive [facility] Encounters (JOSE) topics (CRS, Breast Cancer Screening, Eye exam)  Health Maintenance has been updated.  LINKS immunization registry triggered.  Immunizations were reconciled.

## 2022-05-02 ENCOUNTER — HOSPITAL ENCOUNTER (OUTPATIENT)
Dept: RADIOLOGY | Facility: HOSPITAL | Age: 37
Discharge: HOME | End: 2022-05-02
Attending: PODIATRIST
Payer: COMMERCIAL

## 2022-05-02 ENCOUNTER — OFFICE VISIT (OUTPATIENT)
Dept: PODIATRY | Facility: CLINIC | Age: 37
End: 2022-05-02
Payer: COMMERCIAL

## 2022-05-02 VITALS — WEIGHT: 214.31 LBS | BODY MASS INDEX: 30.68 KG/M2 | HEIGHT: 70 IN

## 2022-05-02 DIAGNOSIS — M79.672 LEFT FOOT PAIN: Primary | ICD-10-CM

## 2022-05-02 DIAGNOSIS — M79.672 LEFT FOOT PAIN: ICD-10-CM

## 2022-05-02 DIAGNOSIS — G57.52 TARSAL TUNNEL SYNDROME OF LEFT SIDE: ICD-10-CM

## 2022-05-02 PROCEDURE — 1160F RVW MEDS BY RX/DR IN RCRD: CPT | Mod: CPTII,S$GLB,, | Performed by: PODIATRIST

## 2022-05-02 PROCEDURE — 1159F MED LIST DOCD IN RCRD: CPT | Mod: CPTII,S$GLB,, | Performed by: PODIATRIST

## 2022-05-02 PROCEDURE — 99999 PR PBB SHADOW E&M-EST. PATIENT-LVL III: CPT | Mod: PBBFAC,,, | Performed by: PODIATRIST

## 2022-05-02 PROCEDURE — 73630 X-RAY EXAM OF FOOT: CPT | Mod: 26,LT,, | Performed by: RADIOLOGY

## 2022-05-02 PROCEDURE — 99214 PR OFFICE/OUTPT VISIT, EST, LEVL IV, 30-39 MIN: ICD-10-PCS | Mod: S$GLB,,, | Performed by: PODIATRIST

## 2022-05-02 PROCEDURE — 73630 XR FOOT COMPLETE 3 VIEW LEFT: ICD-10-PCS | Mod: 26,LT,, | Performed by: RADIOLOGY

## 2022-05-02 PROCEDURE — 3008F PR BODY MASS INDEX (BMI) DOCUMENTED: ICD-10-PCS | Mod: CPTII,S$GLB,, | Performed by: PODIATRIST

## 2022-05-02 PROCEDURE — 99999 PR PBB SHADOW E&M-EST. PATIENT-LVL III: ICD-10-PCS | Mod: PBBFAC,,, | Performed by: PODIATRIST

## 2022-05-02 PROCEDURE — 3008F BODY MASS INDEX DOCD: CPT | Mod: CPTII,S$GLB,, | Performed by: PODIATRIST

## 2022-05-02 PROCEDURE — 1160F PR REVIEW ALL MEDS BY PRESCRIBER/CLIN PHARMACIST DOCUMENTED: ICD-10-PCS | Mod: CPTII,S$GLB,, | Performed by: PODIATRIST

## 2022-05-02 PROCEDURE — 1159F PR MEDICATION LIST DOCUMENTED IN MEDICAL RECORD: ICD-10-PCS | Mod: CPTII,S$GLB,, | Performed by: PODIATRIST

## 2022-05-02 PROCEDURE — 99214 OFFICE O/P EST MOD 30 MIN: CPT | Mod: S$GLB,,, | Performed by: PODIATRIST

## 2022-05-02 PROCEDURE — 73630 X-RAY EXAM OF FOOT: CPT | Mod: TC,LT

## 2022-05-02 NOTE — PROGRESS NOTES
Subjective:       Patient ID: Rahul Farrar is a 36 y.o. male.    Chief Complaint: Foot Pain (C/o left foot pain, rates pain 7/10, nondiabetic, wearing socks and shoes, last seen PCP Dr. Long on 12/16/2021.)    HPI: Rahul Farrar presents to the clinic today with the complaint of persistent burning and tingling to the left lower extremity. Patient states these symptoms have been on going now for the past several months, and are worsening.  Patient states that pains have no nocturnal for disposition. Pain/Discomfort is rated at approx. 7/10. Pain/Discomfort is described as sharp and knife-like. To the toes and up the lower leg.  Patient states profound debility due to the aforementioned.  Is utilizing orthotics to elevate the medial longitudinal arch but this is not been improving his symptoms.  Patient states no trauma. No recent EMG/NCV is stated.     Review of patient's allergies indicates:  No Known Allergies    History reviewed. No pertinent past medical history.    Family History   Problem Relation Age of Onset    Diabetes Maternal Grandfather     Heart disease Maternal Grandfather        Social History     Socioeconomic History    Marital status:    Occupational History    Occupation: operation   Tobacco Use    Smoking status: Never Smoker    Smokeless tobacco: Current User     Types: Chew   Substance and Sexual Activity    Alcohol use: Yes    Drug use: No    Sexual activity: Yes     Partners: Female     Social Determinants of Health     Financial Resource Strain: Low Risk     Difficulty of Paying Living Expenses: Not hard at all   Food Insecurity: No Food Insecurity    Worried About Running Out of Food in the Last Year: Never true    Ran Out of Food in the Last Year: Never true   Transportation Needs: No Transportation Needs    Lack of Transportation (Medical): No    Lack of Transportation (Non-Medical): No   Physical Activity: Insufficiently Active    Days of Exercise  "per Week: 3 days    Minutes of Exercise per Session: 30 min   Stress: Stress Concern Present    Feeling of Stress : To some extent   Social Connections: Unknown    Frequency of Communication with Friends and Family: More than three times a week    Frequency of Social Gatherings with Friends and Family: Once a week    Active Member of Clubs or Organizations: Yes    Attends Club or Organization Meetings: Patient refused    Marital Status:    Housing Stability: Unknown    Unable to Pay for Housing in the Last Year: No    Unstable Housing in the Last Year: No       Past Surgical History:   Procedure Laterality Date    FOREARM FRACTURE SURGERY      NOSE SURGERY      SINUS SURGERY      TYMPANOSTOMY TUBE PLACEMENT         Review of Systems       Objective:   Ht 5' 10" (1.778 m)   Wt 97.2 kg (214 lb 4.6 oz)   BMI 30.75 kg/m²     X-Ray Foot Complete Left  Narrative: EXAMINATION:  XR FOOT COMPLETE 3 VIEW LEFT    CLINICAL HISTORY:  .  Pain in left foot    TECHNIQUE:  AP, lateral and oblique views of the left foot were performed.    COMPARISON:  None    FINDINGS:  There is no radiographic evidence of acute osseous, articular, or soft tissue abnormality.  Mild degenerative findings are present at the 1st MTP joint.  No erosive changes demonstrated.  Impression: As above.  No acute findings.    Electronically signed by: Dax Frey MD  Date:    05/02/2022  Time:    15:20       Physical Exam    LOWER EXTREMITY PHYSICAL EXAMINATION  VASCULAR: On the left foot, the dorsalis pedis pulse is 2/4 and the posterior tibial pulse is 2/4. Capillary refill time is less than 3 seconds. Hair growth is present on the dorsum of the foot and at the digits. No rubor is present. Proximal to distal temperature is warm to warm.    NEUROLOGY: Proprioception is intact, bilateral. Sensation to light touch is intact.  Positive Tinel's Sign on the left.  No Valleix sign on the left.  Mild neurological sensations with compression " of the area of Frausto's Nerve in the area of the Abductor Hallucis muscle belly on the left.     ORTHOPEDIC:  Mild to moderate tenderness to palpation of the area around the plantar medial calcaneal tubercle on the left foot. Pains are characterized as sharp and stabbing-like with direct palpation of the area. There is also no pain to palpation of the immediate plantar aspect of the heel, and no pains to the lateral band of the fascia. No edema is noted. No fullness is noted. No pains or defects are noted within the plantar fascia at the arch. No plantar fibromas are noted. No defects are noted within the ligament. Dorsiflexion of the MTPJs with simultaneous palpation of the fascia at the arch, worsens the pains. No pains with medial to lateral compression of the heel. Antalgic gait pattern is noted.     DERMATOLOGY: No ecchymosis is noted.  Skin is supple, dry and intact. Skin is supple.  No hyperkeratosis noted. No calluses.  No open wounds or ulcerations are noted.  No palpable plantar fibromas noted.    Assessment:     1. Left foot pain    2. Tarsal tunnel syndrome of left side        Plan:     Left foot pain  -     X-Ray Foot Complete Left; Future; Expected date: 05/02/2022    Tarsal tunnel syndrome of left side  -     MRI Foot (Hindfoot) Left Without Contrast; Future; Expected date: 05/09/2022  -     CREATININE, SERUM; Future; Expected date: 05/02/2022        The probable diagnosis, given the clinical examination and history presented, does include Tarsal Tunnel Syndrome of the left foot.  X-rays were taken reviewed by myself with the patient room.  No acute fracture dislocation is noted.. Thorough discussion is had with the patient today, concerning the diagnosis, its etiology, and the treatment algorithm at present. No cortisone injection is given.  Will plan to perform MRI of the left foot and ankle to assess for possible space occupying lesion versus other etiology resulting in tarsal tunnel syndrome.   Will plan to call patient back with results of MRI.          Future Appointments   Date Time Provider Department Center   5/6/2022  8:00 AM [facility] [place]   5/27/2022  1:30 PM Jose Gilliam MD [facility]

## 2022-05-06 ENCOUNTER — HOSPITAL ENCOUNTER (OUTPATIENT)
Dept: RADIOLOGY | Facility: HOSPITAL | Age: 37
Discharge: HOME | End: 2022-05-06
Attending: PODIATRIST
Payer: COMMERCIAL

## 2022-05-06 DIAGNOSIS — G57.52 TARSAL TUNNEL SYNDROME OF LEFT SIDE: ICD-10-CM

## 2022-05-06 PROCEDURE — 73718 MRI LOWER EXTREMITY W/O DYE: CPT | Mod: 26,LT,, | Performed by: RADIOLOGY

## 2022-05-06 PROCEDURE — 73718 MRI LOWER EXTREMITY W/O DYE: CPT | Mod: TC,PO,LT

## 2022-05-06 PROCEDURE — 73718 MRI FOOT (HINDFOOT) LEFT WITHOUT CONTRAST: ICD-10-PCS | Mod: 26,LT,, | Performed by: RADIOLOGY

## 2022-05-11 ENCOUNTER — PATIENT MESSAGE (OUTPATIENT)
Dept: PODIATRY | Facility: CLINIC | Age: 37
End: 2022-05-11
Payer: COMMERCIAL

## 2022-05-11 DIAGNOSIS — M79.672 LEFT FOOT PAIN: ICD-10-CM

## 2022-05-11 DIAGNOSIS — G57.52 TARSAL TUNNEL SYNDROME OF LEFT SIDE: Primary | ICD-10-CM

## 2022-05-12 ENCOUNTER — CLINICAL SUPPORT (OUTPATIENT)
Dept: REHABILITATION | Facility: HOSPITAL | Age: 37
End: 2022-05-12
Attending: PODIATRIST
Payer: COMMERCIAL

## 2022-05-12 DIAGNOSIS — G57.52 TARSAL TUNNEL SYNDROME OF LEFT SIDE: ICD-10-CM

## 2022-05-12 DIAGNOSIS — M79.672 LEFT FOOT PAIN: ICD-10-CM

## 2022-05-12 DIAGNOSIS — R26.2 DIFFICULTY WALKING: ICD-10-CM

## 2022-05-12 PROCEDURE — 97140 MANUAL THERAPY 1/> REGIONS: CPT

## 2022-05-12 PROCEDURE — 97110 THERAPEUTIC EXERCISES: CPT

## 2022-05-12 PROCEDURE — 97162 PT EVAL MOD COMPLEX 30 MIN: CPT

## 2022-05-12 NOTE — PLAN OF CARE
[facility]  Physical Therapy Initial Evaluation    Name: Rahul Farrar  Clinic Number: [id number]    Therapy Diagnosis:   Encounter Diagnoses   Name Primary?    Tarsal tunnel syndrome of left side     Left foot pain     Difficulty walking      Physician: Shaista Pyle DPM    Physician Orders: PT Eval and Treat   Medical Diagnosis from Referral:   G57.52 (ICD-10-CM) - Tarsal tunnel syndrome of left side   M79.672 (ICD-10-CM) - Left foot pain     Evaluation Date: 5/12/2022  Authorization Period Expiration: 12/31/22  Plan of Care Expiration: 7/8/22  Visit # / Visits authorized: 1/ 1  FOTO: 1/10    Time In: 3:05 pm  Time Out: 3:45 pm  Total Billable Time: 40 minutes     Precautions: Standard, Standard    Subjective   Date of onset: 2 years  History of current condition - Rahul reports: left foot pain that began about two years ago as plantar fasciitis. Pain has gradually gotten worse and is pretty constant. Pt now feels more pain on the medial side of his foot. Walking, running, exercise all aggravate the pain.      Medical History:   No past medical history on file.    Surgical History:   Rahul Farrar  has a past surgical history that includes Nose surgery; Forearm fracture surgery; Tympanostomy tube placement; and Sinus surgery.    Medications:   Rahul has a current medication list which includes the following prescription(s): clonazepam, clonazepam, fluoxetine, fluoxetine, ketorolac, and multivitamin.    Allergies:   Review of patient's allergies indicates:  No Known Allergies     Imaging, see chart review    Prior Therapy: no  Social History: Pt lives with their family  Occupation: loading  at [employer]  Prior Level of Function: Ind  Current Level of Function: Ind    Pain:   Current 6/10, worst 10/10, best 4/10   Location: left foot  Description: Burning, Throbbing and Sharp  Aggravating Factors: Walking, Morning and Getting out of bed/chair  Easing Factors:  rest    Pts goals: to get back to running and working out     Objective     Posture: L foot arch decreased vs R  Palpation: medial calcaneal tubercle, medial long arch TTP    Range of Motion/Strength:     Ankle Right Left Pain/Dysfunction with Movement   AROM      dorsiflexion 10 10    plantarflexion 50 45    inversion 20 10 pain   eversion 15 15      L/E MMT Right Left Pain/Dysfunction with Movement   Ankle DF 5/5 5/5    Ankle PF 5/5 5/5    Ankle Inversion 5/5 4/5    Ankle Eversion 5/5 4+/5    Big Toe Extension 5/5 5/5          Single Leg Stance: R normal seconds, L normal seconds      CMS Impairment/Limitation/Restriction for FOTO Foot/Ankle Survey    Therapist reviewed FOTO scores for Rahul Farrar on 5/12/2022.   FOTO documents entered into EPIC - see Media section.    Limitation Score: 49%  Category: Mobility         TREATMENT   Treatment Time In: 3:29  Treatment Time Out: 3:45  Total Treatment time separate from Evaluation: 16 minutes    Rahul received therapeutic exercises to develop strength, endurance and flexibility for 8 minutes including:    Toe yoga  Arch doming  Towel scrunches  Figure 4 GTB        Rahul received the following manual therapy techniques: dry needling were applied to the: left foot for 8 minutes, including:    IMT to quadratus plantae with stim   IMT to abductor hallucis with stim         Home Exercises Provided and Patient Education Provided     Education provided:   - HEP    Written Home Exercises Provided: yes.  Exercises were reviewed and Rahul was able to demonstrate them prior to the end of the session.  Rahul demonstrated good  understanding of the education provided.     See EMR under Patient Instructions for exercises provided 5/12/2022.      Assessment   Rahul is a 36 y.o. male referred to outpatient Physical Therapy with a medical diagnosis of tarsal tunnel syndrome. Pt presents with decreased ankle inversion ROM and strength and difficulty walking and with ADLs.     Pt  prognosis is Good.   Pt will benefit from skilled outpatient Physical Therapy to address the deficits stated above and in the chart below, provide pt/family education, and to maximize pt's level of independence.     Plan of care discussed with patient: Yes  Pt's spiritual, cultural and educational needs considered and patient is agreeable to the plan of care and goals as stated below:     Anticipated Barriers for therapy: chronicity of pain    Medical Necessity is demonstrated by the following  History  Co-morbidities and personal factors that may impact the plan of care Co-morbidities:   see med hx    Personal Factors:   no deficits     moderate   Examination  Body Structures and Functions, activity limitations and participation restrictions that may impact the plan of care Body Regions:   lower extremities    Body Systems:    gross symmetry  ROM  strength  gross coordinated movement  balance    Participation Restrictions:   none    Activity limitations:   Mobility  walking    Self care  no deficits    Domestic Life  doing house work (cleaning house, washing dishes, laundry)  assisting others    Interactions/Relationships  family relationships    Life Areas  employment    Community and Social Life  recreation and leisure         high   Clinical Presentation evolving clinical presentation with changing clinical characteristics moderate   Decision Making/ Complexity Score: moderate       Goals:  Short Term Goals: 4 weeks   1. Patient will be independent with HEP.    Long Term Goals: 8 weeks   1. Patient will improve left ankle inversion range of motion to 20 degrees or greater.  2. Pt will improve LLE MMT to 5/5.  3. Pt will report pain at worst at 4/10 or less.  4. Pt demo improvements in FOTO score to 31% limited or less.    Plan   Plan of care Certification: 5/12/2022 to 7/8/22.    Outpatient Physical Therapy 2 times weekly for 8 weeks to include the following interventions: Manual Therapy, Moist Heat/ Ice,  Neuromuscular Re-ed, Patient Education, Therapeutic Activities and Therapeutic Exercise, ASTYM, Kinesiotaping PRN, Functional Dry Needling    Ana Hernandez, PT, DPT

## 2022-05-20 RX ORDER — FLUOXETINE 10 MG/1
CAPSULE ORAL
Qty: 60 CAPSULE | Refills: 1 | Status: SHIPPED | OUTPATIENT
Start: 2022-05-20 | End: 2022-08-04

## 2022-05-20 RX ORDER — CLONAZEPAM 0.5 MG/1
TABLET ORAL
Qty: 60 TABLET | Refills: 0 | Status: SHIPPED | OUTPATIENT
Start: 2022-05-20 | End: 2022-09-06

## 2022-05-25 ENCOUNTER — CLINICAL SUPPORT (OUTPATIENT)
Dept: REHABILITATION | Facility: HOSPITAL | Age: 37
End: 2022-05-25
Payer: COMMERCIAL

## 2022-05-25 DIAGNOSIS — R26.2 DIFFICULTY WALKING: Primary | ICD-10-CM

## 2022-05-25 PROCEDURE — 97110 THERAPEUTIC EXERCISES: CPT

## 2022-05-25 PROCEDURE — 97140 MANUAL THERAPY 1/> REGIONS: CPT

## 2022-05-26 ENCOUNTER — CLINICAL SUPPORT (OUTPATIENT)
Dept: REHABILITATION | Facility: HOSPITAL | Age: 37
End: 2022-05-26
Payer: COMMERCIAL

## 2022-05-26 DIAGNOSIS — R26.2 DIFFICULTY WALKING: Primary | ICD-10-CM

## 2022-05-26 PROCEDURE — 97140 MANUAL THERAPY 1/> REGIONS: CPT

## 2022-05-26 PROCEDURE — 97110 THERAPEUTIC EXERCISES: CPT

## 2022-05-26 NOTE — PROGRESS NOTES
"[facility]   Physical Therapy Treatment Note     Name: Rahul Farrar  Clinic Number: [id number]    Therapy Diagnosis:   Encounter Diagnosis   Name Primary?    Difficulty walking Yes     Physician: Shaista Pyle DPM    Visit Date: 5/26/2022    Physician Orders: PT Eval and Treat   Medical Diagnosis from Referral:   G57.52 (ICD-10-CM) - Tarsal tunnel syndrome of left side   M79.672 (ICD-10-CM) - Left foot pain      Evaluation Date: 5/12/2022  Authorization Period Expiration: 12/31/22  Plan of Care Expiration: 7/8/22  Visit # / Visits authorized: 3/20  FOTO: 1/3     Time In: 2:55 pm  Time Out: 3:40 pm  Total Billable Time: 45 minutes      Precautions: Standard        SUBJECTIVE     Pt reports: foot is better since last visit.   He was compliant with home exercise program.  Response to previous treatment: decreased pain  Functional change: none yet    Pain: 4/10  Location: left foot    OBJECTIVE     Objective Measures updated at progress report unless specified.     Treatment     Rahul received the treatments listed below:      Rahul received therapeutic exercises to develop strength, endurance and flexibility for 35 minutes including:     Bike 5'  Toe yoga  Arch doming  Towel scrunches  Figure 4 GTB  Great toe flexion GTB  Plank to down dog  Toe walks/heel walks  ISO heel raise hold 35# barbell 45" 2x  SL heel raise with OH plate hold  SLS airex w/ KB pass arounds  SLS w/ KB press out  MOBO board 10x (both directions)          Rahul received the following manual therapy techniques: dry needling were applied to the: left foot for 10 minutes, including:     IMT to medial gastroc with stim          Patient Education and Home Exercises     Home Exercises Provided and Patient Education Provided     Education provided:   - HEP     Written Home Exercises Provided: yes.  Exercises were reviewed and Rahul was able to demonstrate them prior to the end of the session.  Rahul demonstrated good "  understanding of the education provided.      See EMR under Patient Instructions for exercises provided 5/12/2022.    ASSESSMENT     Pt tolerated second follow up visit well with no adverse effects. Able to load foot/ankle more today as well as challenge balance with MOBO board.     Rahul Is progressing well towards his goals.   Pt prognosis is Good.     Pt will continue to benefit from skilled outpatient physical therapy to address the deficits listed in the problem list box on initial evaluation, provide pt/family education and to maximize pt's level of independence in the home and community environment.     Pt's spiritual, cultural and educational needs considered and pt agreeable to plan of care and goals.     Anticipated barriers to physical therapy: chronicity of pain    Goals:     Short Term Goals: 4 weeks   1. Patient will be independent with HEP.     Long Term Goals: 8 weeks   1. Patient will improve left ankle inversion range of motion to 20 degrees or greater.  2. Pt will improve LLE MMT to 5/5.  3. Pt will report pain at worst at 4/10 or less.  4. Pt demo improvements in FOTO score to 31% limited or less.    PLAN     Continue with current POC.    Ana Hernandez, PT

## 2022-05-30 ENCOUNTER — CLINICAL SUPPORT (OUTPATIENT)
Dept: REHABILITATION | Facility: HOSPITAL | Age: 37
End: 2022-05-30
Payer: COMMERCIAL

## 2022-05-30 DIAGNOSIS — R26.2 DIFFICULTY WALKING: Primary | ICD-10-CM

## 2022-05-30 PROCEDURE — 97140 MANUAL THERAPY 1/> REGIONS: CPT

## 2022-05-30 PROCEDURE — 97110 THERAPEUTIC EXERCISES: CPT

## 2022-05-30 NOTE — PROGRESS NOTES
"[facility]   Physical Therapy Treatment Note     Name: Rahul Farrar  Clinic Number: [id number]    Therapy Diagnosis:   Encounter Diagnosis   Name Primary?    Difficulty walking Yes     Physician: Shaista Pyle DPM    Visit Date: 5/30/2022    Physician Orders: PT Eval and Treat   Medical Diagnosis from Referral:   G57.52 (ICD-10-CM) - Tarsal tunnel syndrome of left side   M79.672 (ICD-10-CM) - Left foot pain      Evaluation Date: 5/12/2022  Authorization Period Expiration: 12/31/22  Plan of Care Expiration: 7/8/22  Visit # / Visits authorized: 4/20  FOTO: 1/3     Time In: 12:15 pm  Time Out: 1:05 pm  Total Billable Time: 50 minutes      Precautions: Standard        SUBJECTIVE     Pt reports: foot is hurting today; worked 3 days in a row.   He was compliant with home exercise program.  Response to previous treatment: decreased pain  Functional change: none yet    Pain: 4/10  Location: left foot    OBJECTIVE     Objective Measures updated at progress report unless specified.     Treatment     Rahul received the treatments listed below:      Rahul received therapeutic exercises to develop strength, endurance and flexibility for 40 minutes including:     Bike 5'  Toe yoga (standing in SLS)  Towel scrunches  Figure 4 GTB- NP  Great toe flexion GTB  Plank to down dog  Plank to ankle taps  Toe walks/heel walks 15# KB  ISO heel raise hold 35# barbell 45" 2x  SL heel raise with OH plate hold (20#)  SLS w/ KB pass arounds on MOBO  SLS w/ KB press out on MOBO          Rahul received the following manual therapy techniques: dry needling were applied to the: left foot for 10 minutes, including:     IMT to quadratus plantae with stim   IMT to abductor hallucis with stim   IMT to medial gastroc with stim  IMT to tibialis posterior with stim          Patient Education and Home Exercises     Home Exercises Provided and Patient Education Provided     Education provided:   - HEP     Written Home " Exercises Provided: yes.  Exercises were reviewed and Rahul was able to demonstrate them prior to the end of the session.  Rahul demonstrated good  understanding of the education provided.      See EMR under Patient Instructions for exercises provided 5/12/2022.    ASSESSMENT     Pt tolerated third follow up visit well with no adverse effects. Increased pain today after work for 3 days in a row but was able to perform all exercises and continued good results with IMT.     Rahul Is progressing well towards his goals.   Pt prognosis is Good.     Pt will continue to benefit from skilled outpatient physical therapy to address the deficits listed in the problem list box on initial evaluation, provide pt/family education and to maximize pt's level of independence in the home and community environment.     Pt's spiritual, cultural and educational needs considered and pt agreeable to plan of care and goals.     Anticipated barriers to physical therapy: chronicity of pain    Goals:     Short Term Goals: 4 weeks   1. Patient will be independent with HEP.     Long Term Goals: 8 weeks   1. Patient will improve left ankle inversion range of motion to 20 degrees or greater.  2. Pt will improve LLE MMT to 5/5.  3. Pt will report pain at worst at 4/10 or less.  4. Pt demo improvements in FOTO score to 31% limited or less.    PLAN     Continue with current POC.    Ana Hernandez, PT

## 2022-06-08 ENCOUNTER — CLINICAL SUPPORT (OUTPATIENT)
Dept: REHABILITATION | Facility: HOSPITAL | Age: 37
End: 2022-06-08
Payer: COMMERCIAL

## 2022-06-08 DIAGNOSIS — R26.2 DIFFICULTY WALKING: Primary | ICD-10-CM

## 2022-06-08 PROCEDURE — 97110 THERAPEUTIC EXERCISES: CPT

## 2022-06-08 PROCEDURE — 97140 MANUAL THERAPY 1/> REGIONS: CPT

## 2022-06-08 NOTE — PROGRESS NOTES
"[facility]   Physical Therapy Treatment Note     Name: Rahul Farrar  Clinic Number: [id number]    Therapy Diagnosis:   Encounter Diagnosis   Name Primary?    Difficulty walking Yes     Physician: Shaista Pyle DPM    Visit Date: 6/8/2022    Physician Orders: PT Eval and Treat   Medical Diagnosis from Referral:   G57.52 (ICD-10-CM) - Tarsal tunnel syndrome of left side   M79.672 (ICD-10-CM) - Left foot pain      Evaluation Date: 5/12/2022  Authorization Period Expiration: 12/31/22  Plan of Care Expiration: 7/8/22  Visit # / Visits authorized: 5/20  FOTO: 2/3     Time In: 12:35 pm  Time Out: 1:10 pm  Total Billable Time: 35 minutes      Precautions: Standard        SUBJECTIVE     Pt reports: frustrated he still has foot pain. Mainly feels burning sensation.   He was compliant with home exercise program.  Response to previous treatment: decreased pain  Functional change: none yet    Pain: 4/10  Location: left foot    OBJECTIVE     Objective Measures updated at progress report unless specified.     Treatment     Rahul received the treatments listed below:      Rahul received therapeutic exercises to develop strength, endurance and flexibility for 25 minutes including:       Plank to down dog  SL deadlift 10# KB  SLS w/ OH press 35# barbell   ISO heel raise hold 45# barbell 60" 2x  SL heel raise with OH plate hold   SLS w/ ball toss on airex  Sled push 50# 2 laps          Rahul received the following manual therapy techniques: dry needling were applied to the: left foot for 10 minutes, including:     IMT to quadratus plantae with stim   IMT to abductor hallucis with stim           Patient Education and Home Exercises     Home Exercises Provided and Patient Education Provided     Education provided:   - HEP     Written Home Exercises Provided: yes.  Exercises were reviewed and Rahul was able to demonstrate them prior to the end of the session.  Rahul demonstrated good  understanding " of the education provided.      See EMR under Patient Instructions for exercises provided 5/12/2022.    ASSESSMENT     Pt tolerated follow up visit well with no adverse effects. Decreased pain re-testing pivot on L foot after IMT. Pt tolerated exercises well; minimal burning in L foot with iso heel raise hold on 2nd trial after about 45 seconds. No other exercises reproduced pain.    Rahul Is progressing well towards his goals.   Pt prognosis is Good.     Pt will continue to benefit from skilled outpatient physical therapy to address the deficits listed in the problem list box on initial evaluation, provide pt/family education and to maximize pt's level of independence in the home and community environment.     Pt's spiritual, cultural and educational needs considered and pt agreeable to plan of care and goals.     Anticipated barriers to physical therapy: chronicity of pain    Goals:     Short Term Goals: 4 weeks   1. Patient will be independent with HEP.     Long Term Goals: 8 weeks   1. Patient will improve left ankle inversion range of motion to 20 degrees or greater.  2. Pt will improve LLE MMT to 5/5.  3. Pt will report pain at worst at 4/10 or less.  4. Pt demo improvements in FOTO score to 31% limited or less.    PLAN     Continue with current POC.    Ana Hernandez, PT

## 2022-06-09 ENCOUNTER — CLINICAL SUPPORT (OUTPATIENT)
Dept: REHABILITATION | Facility: HOSPITAL | Age: 37
End: 2022-06-09
Payer: COMMERCIAL

## 2022-06-09 DIAGNOSIS — R26.2 DIFFICULTY WALKING: Primary | ICD-10-CM

## 2022-06-09 PROCEDURE — 97140 MANUAL THERAPY 1/> REGIONS: CPT

## 2022-06-09 PROCEDURE — 97110 THERAPEUTIC EXERCISES: CPT

## 2022-06-09 NOTE — PROGRESS NOTES
"[facility]   Physical Therapy Treatment Note     Name: Rahul Farrar  Clinic Number: [id number]    Therapy Diagnosis:   Encounter Diagnosis   Name Primary?    Difficulty walking Yes     Physician: Shaista Pyle DPM    Visit Date: 6/9/2022    Physician Orders: PT Eval and Treat   Medical Diagnosis from Referral:   G57.52 (ICD-10-CM) - Tarsal tunnel syndrome of left side   M79.672 (ICD-10-CM) - Left foot pain      Evaluation Date: 5/12/2022  Authorization Period Expiration: 12/31/22  Plan of Care Expiration: 7/8/22  Visit # / Visits authorized: 6/20  FOTO: 2/3     Time In: 12:50 pm  Time Out: 1:25 pm  Total Billable Time: 35 minutes      Precautions: Standard        SUBJECTIVE     Pt reports: sore from yesterday  He was compliant with home exercise program.  Response to previous treatment: decreased pain  Functional change: none yet    Pain: 4/10  Location: left foot    OBJECTIVE     Objective Measures updated at progress report unless specified.     Treatment     Rahul received the treatments listed below:      Rahul received therapeutic exercises to develop strength, endurance and flexibility for 25 minutes including:     Plank to down dog  Front plank 60"  Side plank 30" B  Toy soldiers  HS sweeps   SL deadlift 10# KB  SLS w/ OH press 35# barbell   SLS w/ on airex KB pass  MOBO w/ KB press out  MOBO rocking 1' each  Single leg STS 10x ea (20")    NEXT: rolling, prone/supine scorpions            Rahul received the following manual therapy techniques: dry needling were applied to the: left foot for 10 minutes, including:     IMT to quadratus plantae with stim   IMT to abductor hallucis with stim   IMT to medial gastroc with stim           Patient Education and Home Exercises     Home Exercises Provided and Patient Education Provided     Education provided:   - HEP     Written Home Exercises Provided: yes.  Exercises were reviewed and Rahul was able to demonstrate them prior to " the end of the session.  Rahul demonstrated good  understanding of the education provided.      See EMR under Patient Instructions for exercises provided 5/12/2022.    ASSESSMENT     Pt tolerated follow up visit well with no adverse effects. Able to incorporate more SL balance activities as well as core with planks.     Rahul Is progressing well towards his goals.   Pt prognosis is Good.     Pt will continue to benefit from skilled outpatient physical therapy to address the deficits listed in the problem list box on initial evaluation, provide pt/family education and to maximize pt's level of independence in the home and community environment.     Pt's spiritual, cultural and educational needs considered and pt agreeable to plan of care and goals.     Anticipated barriers to physical therapy: chronicity of pain    Goals:     Short Term Goals: 4 weeks   1. Patient will be independent with HEP.     Long Term Goals: 8 weeks   1. Patient will improve left ankle inversion range of motion to 20 degrees or greater.  2. Pt will improve LLE MMT to 5/5.  3. Pt will report pain at worst at 4/10 or less.  4. Pt demo improvements in FOTO score to 31% limited or less.    PLAN     Continue with current POC.    Ana Hernandez, PT

## 2022-06-17 ENCOUNTER — CLINICAL SUPPORT (OUTPATIENT)
Dept: REHABILITATION | Facility: HOSPITAL | Age: 37
End: 2022-06-17
Payer: COMMERCIAL

## 2022-06-17 DIAGNOSIS — R26.2 DIFFICULTY WALKING: Primary | ICD-10-CM

## 2022-06-17 PROCEDURE — 97110 THERAPEUTIC EXERCISES: CPT

## 2022-06-17 PROCEDURE — 97140 MANUAL THERAPY 1/> REGIONS: CPT

## 2022-06-17 NOTE — PROGRESS NOTES
"[facility]   Physical Therapy Treatment Note     Name: Rahul Farrar  Clinic Number: [id number]    Therapy Diagnosis:   Encounter Diagnosis   Name Primary?    Difficulty walking Yes     Physician: Shaista Pyle DPM    Visit Date: 6/17/2022    Physician Orders: PT Eval and Treat   Medical Diagnosis from Referral:   G57.52 (ICD-10-CM) - Tarsal tunnel syndrome of left side   M79.672 (ICD-10-CM) - Left foot pain      Evaluation Date: 5/12/2022  Authorization Period Expiration: 12/31/22  Plan of Care Expiration: 7/8/22  Visit # / Visits authorized: 7/20  FOTO: 2/3     Time In: 4:35 pm  Time Out: 5:18 pm  Total Billable Time: 43 minutes      Precautions: Standard        SUBJECTIVE     Pt reports: foot is doing better; did not notice the pain after working two days in a row.   He was compliant with home exercise program.  Response to previous treatment: decreased pain  Functional change: decreased pain at work  Pain: 4/10  Location: left foot    OBJECTIVE     Objective Measures updated at progress report unless specified.     Treatment     Rahul received the treatments listed below:      Rahul received therapeutic exercises to develop strength, endurance and flexibility for 33 minutes including:     Plank to down dog  Front plank 60"  Side plank 30" B  Toy soldiers  HS sweeps   Toe walks 15# KB  SL deadlift 35# barbell   SLS w/ OH press 35# barbell   SLS w/ on airex KB pass  MOBO w/ KB press out  MOBO rocking 1' each  Single leg STS 10x ea (20")  Rolling  Prone/supine scorpions            Rahul received the following manual therapy techniques: dry needling were applied to the: left foot for 10 minutes, including:     IMT to quadratus plantae with stim   IMT to abductor hallucis with stim   IMT to medial gastroc with stim           Patient Education and Home Exercises     Home Exercises Provided and Patient Education Provided     Education provided:   - HEP     Written Home Exercises " Provided: yes.  Exercises were reviewed and Rahul was able to demonstrate them prior to the end of the session.  Rahul demonstrated good  understanding of the education provided.      See EMR under Patient Instructions for exercises provided 5/12/2022.    ASSESSMENT     Pt tolerated follow up visit well with no adverse effects. Improved stability on single leg exercises noted. Less pain and difficulty pushing forward on MOBO board.      Rahul Is progressing well towards his goals.   Pt prognosis is Good.     Pt will continue to benefit from skilled outpatient physical therapy to address the deficits listed in the problem list box on initial evaluation, provide pt/family education and to maximize pt's level of independence in the home and community environment.     Pt's spiritual, cultural and educational needs considered and pt agreeable to plan of care and goals.     Anticipated barriers to physical therapy: chronicity of pain    Goals:     Short Term Goals: 4 weeks   1. Patient will be independent with HEP.     Long Term Goals: 8 weeks   1. Patient will improve left ankle inversion range of motion to 20 degrees or greater.  2. Pt will improve LLE MMT to 5/5.  3. Pt will report pain at worst at 4/10 or less.  4. Pt demo improvements in FOTO score to 31% limited or less.    PLAN     Continue with current POC.    Ana Hernandez, PT

## 2022-07-20 ENCOUNTER — OFFICE VISIT (OUTPATIENT)
Dept: UROLOGY | Facility: CLINIC | Age: 37
End: 2022-07-20
Payer: COMMERCIAL

## 2022-07-20 VITALS
BODY MASS INDEX: 32.52 KG/M2 | SYSTOLIC BLOOD PRESSURE: 125 MMHG | WEIGHT: 226.63 LBS | DIASTOLIC BLOOD PRESSURE: 85 MMHG

## 2022-07-20 DIAGNOSIS — E29.1 TESTICULAR HYPOGONADISM: Primary | ICD-10-CM

## 2022-07-20 PROBLEM — R26.2 DIFFICULTY WALKING: Status: RESOLVED | Noted: 2022-05-12 | Resolved: 2022-07-20

## 2022-07-20 PROBLEM — M72.2 PLANTAR FASCIITIS: Status: RESOLVED | Noted: 2020-01-08 | Resolved: 2022-07-20

## 2022-07-20 PROCEDURE — 3074F PR MOST RECENT SYSTOLIC BLOOD PRESSURE < 130 MM HG: ICD-10-PCS | Mod: CPTII,S$GLB,, | Performed by: NURSE PRACTITIONER

## 2022-07-20 PROCEDURE — 3079F DIAST BP 80-89 MM HG: CPT | Mod: CPTII,S$GLB,, | Performed by: NURSE PRACTITIONER

## 2022-07-20 PROCEDURE — 3079F PR MOST RECENT DIASTOLIC BLOOD PRESSURE 80-89 MM HG: ICD-10-PCS | Mod: CPTII,S$GLB,, | Performed by: NURSE PRACTITIONER

## 2022-07-20 PROCEDURE — 99214 OFFICE O/P EST MOD 30 MIN: CPT | Mod: S$GLB,,, | Performed by: NURSE PRACTITIONER

## 2022-07-20 PROCEDURE — 99999 PR PBB SHADOW E&M-EST. PATIENT-LVL III: CPT | Mod: PBBFAC,,, | Performed by: NURSE PRACTITIONER

## 2022-07-20 PROCEDURE — 99999 PR PBB SHADOW E&M-EST. PATIENT-LVL III: ICD-10-PCS | Mod: PBBFAC,,, | Performed by: NURSE PRACTITIONER

## 2022-07-20 PROCEDURE — 3008F BODY MASS INDEX DOCD: CPT | Mod: CPTII,S$GLB,, | Performed by: NURSE PRACTITIONER

## 2022-07-20 PROCEDURE — 3008F PR BODY MASS INDEX (BMI) DOCUMENTED: ICD-10-PCS | Mod: CPTII,S$GLB,, | Performed by: NURSE PRACTITIONER

## 2022-07-20 PROCEDURE — 1159F PR MEDICATION LIST DOCUMENTED IN MEDICAL RECORD: ICD-10-PCS | Mod: CPTII,S$GLB,, | Performed by: NURSE PRACTITIONER

## 2022-07-20 PROCEDURE — 3074F SYST BP LT 130 MM HG: CPT | Mod: CPTII,S$GLB,, | Performed by: NURSE PRACTITIONER

## 2022-07-20 PROCEDURE — 1159F MED LIST DOCD IN RCRD: CPT | Mod: CPTII,S$GLB,, | Performed by: NURSE PRACTITIONER

## 2022-07-20 PROCEDURE — 99214 PR OFFICE/OUTPT VISIT, EST, LEVL IV, 30-39 MIN: ICD-10-PCS | Mod: S$GLB,,, | Performed by: NURSE PRACTITIONER

## 2022-07-20 NOTE — PROGRESS NOTES
Chief Complaint:   Hypogonadism    HPI:   Patient is a 36-year-old male that is presenting with concerns of hypogonadism.  Patient was seen several years ago and total T was at the low-side of normal. Patient states that he and his wife are no longer wanting any more children and he would like to discuss possible testosterone replacement therapy.  No labs in the last 16 months.  Patient states that he has had fatigue and energy level has greatly decreased.Normal sexual function.    Allergies:  Patient has no known allergies.    Medications:  has a current medication list which includes the following prescription(s): clonazepam, clonazepam, fluoxetine, fluoxetine, multivitamin, and ketorolac.    Review of Systems:  General: No fever, chills, fatigability, or weight loss.  Skin: No rashes, itching, or changes in color or texture of skin.  Chest: Denies LAKHANI, cyanosis, wheezing, cough, and sputum production.  Abdomen: Appetite fine. No weight loss. Denies diarrhea, abdominal pain, hematemesis, or blood in stool.  Musculoskeletal: No joint stiffness or swelling. Denies back pain.  : As above.  All other review of systems negative.    PMH:   has no past medical history on file.    PSH:   has a past surgical history that includes Nose surgery; Forearm fracture surgery; Tympanostomy tube placement; and Sinus surgery.    FamHx: family history includes Diabetes in his maternal grandfather; Heart disease in his maternal grandfather.    SocHx:  reports that he has never smoked. His smokeless tobacco use includes chew. He reports current alcohol use. He reports that he does not use drugs.      Physical Exam:  Vitals:    07/20/22 1328   BP: 125/85     General: A&Ox3, no apparent distress, no deformities  Neck: No masses, normal thyroid  Lungs: normal inspiration, no use of accessory muscles  Heart: normal pulse, no arrhythmias  Abdomen: Soft, NT, ND, no masses, no hernias, no hepatosplenomegaly  Lymphatic: Neck and groin nodes  negative    Labs/Studies:    Latest Reference Range & Units 12/16/19 11:07 03/07/20 11:33 03/15/21 10:45   Testosterone 250 - 1100 ng/dL 280 293 359     Impression/Plan:   Possible Low T - Reviewed diagnosis and management of testosterone deficiency. Explained that AUA guidelines recommend obtaining two early morning testosterone values less than 300 with associated symptoms prior to diagnosing low testosterone. Reviewed management of low T including exogenous T(injections, creams, gels, etc), SERMs, aromatase inhibitors, and recombinant HCG. Reviewed that any exogenous T usage will result in drastically impaired fertility, and that this effect may be permanent. I reviewed that if the patient desires maintaining fertility while addressing his low T, exogenous T would not be an appropriate choice. Also explained that addressing his low T would require routine lab testing to ensure that his blood counts remain stable. Will obtain testosterone, prolactin, estradiol, TSH, LH and FSH.Will call with lab results.

## 2022-07-21 ENCOUNTER — LAB VISIT (OUTPATIENT)
Dept: LAB | Facility: HOSPITAL | Age: 37
End: 2022-07-21
Attending: NURSE PRACTITIONER
Payer: COMMERCIAL

## 2022-07-21 DIAGNOSIS — E29.1 TESTICULAR HYPOGONADISM: ICD-10-CM

## 2022-07-21 LAB
ALBUMIN SERPL BCP-MCNC: 3.9 G/DL (ref 3.5–5.2)
ALP SERPL-CCNC: 47 U/L (ref 55–135)
ALT SERPL W/O P-5'-P-CCNC: 23 U/L (ref 10–44)
AST SERPL-CCNC: 16 U/L (ref 10–40)
BILIRUB DIRECT SERPL-MCNC: 0.2 MG/DL (ref 0.1–0.3)
BILIRUB SERPL-MCNC: 0.6 MG/DL (ref 0.1–1)
ESTRADIOL SERPL-MCNC: 19 PG/ML (ref 11–44)
FSH SERPL-ACNC: 3.57 MIU/ML (ref 0.95–11.95)
HCT VFR BLD AUTO: 44.9 % (ref 40–54)
LH SERPL-ACNC: 3.1 MIU/ML (ref 0.6–12.1)
PROLACTIN SERPL IA-MCNC: 16.6 NG/ML (ref 3.5–19.4)
PROT SERPL-MCNC: 6.3 G/DL (ref 6–8.4)
TSH SERPL DL<=0.005 MIU/L-ACNC: 1.36 UIU/ML (ref 0.4–4)

## 2022-07-21 PROCEDURE — 82670 ASSAY OF TOTAL ESTRADIOL: CPT | Performed by: NURSE PRACTITIONER

## 2022-07-21 PROCEDURE — 84443 ASSAY THYROID STIM HORMONE: CPT | Performed by: NURSE PRACTITIONER

## 2022-07-21 PROCEDURE — 84146 ASSAY OF PROLACTIN: CPT | Performed by: NURSE PRACTITIONER

## 2022-07-21 PROCEDURE — 80076 HEPATIC FUNCTION PANEL: CPT | Performed by: NURSE PRACTITIONER

## 2022-07-21 PROCEDURE — 36415 COLL VENOUS BLD VENIPUNCTURE: CPT | Performed by: NURSE PRACTITIONER

## 2022-07-21 PROCEDURE — 85014 HEMATOCRIT: CPT | Performed by: NURSE PRACTITIONER

## 2022-07-21 PROCEDURE — 83001 ASSAY OF GONADOTROPIN (FSH): CPT | Performed by: NURSE PRACTITIONER

## 2022-07-21 PROCEDURE — 83002 ASSAY OF GONADOTROPIN (LH): CPT | Performed by: NURSE PRACTITIONER

## 2022-07-21 PROCEDURE — 82040 ASSAY OF SERUM ALBUMIN: CPT | Performed by: NURSE PRACTITIONER

## 2022-07-28 ENCOUNTER — PATIENT MESSAGE (OUTPATIENT)
Dept: UROLOGY | Facility: CLINIC | Age: 37
End: 2022-07-28
Payer: COMMERCIAL

## 2022-07-29 ENCOUNTER — PATIENT MESSAGE (OUTPATIENT)
Dept: UROLOGY | Facility: CLINIC | Age: 37
End: 2022-07-29
Payer: COMMERCIAL

## 2022-07-29 LAB
ALBUMIN SERPL-MCNC: 4.1 G/DL (ref 3.6–5.1)
SHBG SERPL-SCNC: 18 NMOL/L (ref 10–50)
TESTOST FREE SERPL-MCNC: 61.2 PG/ML (ref 46–224)
TESTOST SERPL-MCNC: 297 NG/DL (ref 250–1100)
TESTOSTERONE.FREE+WB SERPL-MCNC: 115.2 NG/DL (ref 110–575)

## 2022-08-04 RX ORDER — CLONAZEPAM 0.5 MG/1
0.5 TABLET ORAL 3 TIMES DAILY PRN
Qty: 90 TABLET | Refills: 0 | Status: SHIPPED | OUTPATIENT
Start: 2022-08-04 | End: 2022-09-02 | Stop reason: SDUPTHER

## 2022-08-04 RX ORDER — FLUOXETINE 10 MG/1
30 CAPSULE ORAL DAILY
Qty: 90 CAPSULE | Refills: 1 | Status: SHIPPED | OUTPATIENT
Start: 2022-08-04 | End: 2022-09-23

## 2022-08-04 NOTE — TELEPHONE ENCOUNTER
Patient has an appt on 9/23/22  Please look at the Fluoxetine prescriptions there are 2 one for 10 mg dated on 5/20/22 and one dated for 3/28/22

## 2022-08-08 ENCOUNTER — TELEPHONE (OUTPATIENT)
Dept: UROLOGY | Facility: CLINIC | Age: 37
End: 2022-08-08
Payer: COMMERCIAL

## 2022-08-09 ENCOUNTER — OFFICE VISIT (OUTPATIENT)
Dept: UROLOGY | Facility: CLINIC | Age: 37
End: 2022-08-09
Payer: COMMERCIAL

## 2022-08-09 VITALS
BODY MASS INDEX: 33.72 KG/M2 | TEMPERATURE: 99 F | SYSTOLIC BLOOD PRESSURE: 123 MMHG | HEIGHT: 70 IN | DIASTOLIC BLOOD PRESSURE: 82 MMHG | HEART RATE: 71 BPM | WEIGHT: 235.56 LBS

## 2022-08-09 DIAGNOSIS — E29.1 TESTICULAR HYPOGONADISM: Primary | ICD-10-CM

## 2022-08-09 PROCEDURE — 3074F PR MOST RECENT SYSTOLIC BLOOD PRESSURE < 130 MM HG: ICD-10-PCS | Mod: CPTII,S$GLB,, | Performed by: NURSE PRACTITIONER

## 2022-08-09 PROCEDURE — 3074F SYST BP LT 130 MM HG: CPT | Mod: CPTII,S$GLB,, | Performed by: NURSE PRACTITIONER

## 2022-08-09 PROCEDURE — 3079F DIAST BP 80-89 MM HG: CPT | Mod: CPTII,S$GLB,, | Performed by: NURSE PRACTITIONER

## 2022-08-09 PROCEDURE — 99999 PR PBB SHADOW E&M-EST. PATIENT-LVL III: CPT | Mod: PBBFAC,,, | Performed by: NURSE PRACTITIONER

## 2022-08-09 PROCEDURE — 3008F PR BODY MASS INDEX (BMI) DOCUMENTED: ICD-10-PCS | Mod: CPTII,S$GLB,, | Performed by: NURSE PRACTITIONER

## 2022-08-09 PROCEDURE — 99214 OFFICE O/P EST MOD 30 MIN: CPT | Mod: S$GLB,,, | Performed by: NURSE PRACTITIONER

## 2022-08-09 PROCEDURE — 3079F PR MOST RECENT DIASTOLIC BLOOD PRESSURE 80-89 MM HG: ICD-10-PCS | Mod: CPTII,S$GLB,, | Performed by: NURSE PRACTITIONER

## 2022-08-09 PROCEDURE — 99999 PR PBB SHADOW E&M-EST. PATIENT-LVL III: ICD-10-PCS | Mod: PBBFAC,,, | Performed by: NURSE PRACTITIONER

## 2022-08-09 PROCEDURE — 99214 PR OFFICE/OUTPT VISIT, EST, LEVL IV, 30-39 MIN: ICD-10-PCS | Mod: S$GLB,,, | Performed by: NURSE PRACTITIONER

## 2022-08-09 PROCEDURE — 1159F MED LIST DOCD IN RCRD: CPT | Mod: CPTII,S$GLB,, | Performed by: NURSE PRACTITIONER

## 2022-08-09 PROCEDURE — 1159F PR MEDICATION LIST DOCUMENTED IN MEDICAL RECORD: ICD-10-PCS | Mod: CPTII,S$GLB,, | Performed by: NURSE PRACTITIONER

## 2022-08-09 PROCEDURE — 3008F BODY MASS INDEX DOCD: CPT | Mod: CPTII,S$GLB,, | Performed by: NURSE PRACTITIONER

## 2022-08-09 RX ORDER — SYRINGE, DISPOSABLE, 3 ML
SYRINGE, EMPTY DISPOSABLE MISCELLANEOUS
Refills: 0 | Status: CANCELLED | OUTPATIENT
Start: 2022-08-09

## 2022-08-09 RX ORDER — TESTOSTERONE CYPIONATE 200 MG/ML
200 INJECTION, SOLUTION INTRAMUSCULAR
Qty: 10 ML | Refills: 5 | Status: SHIPPED | OUTPATIENT
Start: 2022-08-09 | End: 2022-08-25

## 2022-08-09 NOTE — PROGRESS NOTES
Chief Complaint:   Hypogonadism in males    HPI:   Patient is a 36-year-old male that is presenting as a follow-up to review testosterone panel and additional hypogonadism labs.  Additional labs were normal limits, however, testosterone remains low.  Patient wants to consider testosterone replacement therapy.    07/20/2022  Patient is a 36-year-old male that is presenting with concerns of hypogonadism.  Patient was seen several years ago and total T was at the low-side of normal. Patient states that he and his wife are no longer wanting any more children and he would like to discuss possible testosterone replacement therapy.  No labs in the last 16 months.  Patient states that he has had fatigue and energy level has greatly decreased.Normal sexual function.       Allergies:  Patient has no known allergies.    Medications:  has a current medication list which includes the following prescription(s): clonazepam, clonazepam, fluoxetine, multivitamin, needle (disp) 21 g, safety needles, syringe (disposable), and testosterone cypionate.    Review of Systems:  General: No fever, chills, fatigability, or weight loss.  Skin: No rashes, itching, or changes in color or texture of skin.  Chest: Denies LAKHANI, cyanosis, wheezing, cough, and sputum production.  Abdomen: Appetite fine. No weight loss. Denies diarrhea, abdominal pain, hematemesis, or blood in stool.  Musculoskeletal: No joint stiffness or swelling. Denies back pain.  : As above.  All other review of systems negative.    PMH:  Hypogonadism in males    PSH:   has a past surgical history that includes Nose surgery; Forearm fracture surgery; Tympanostomy tube placement; and Sinus surgery.    FamHx: family history includes Diabetes in his maternal grandfather; Heart disease in his maternal grandfather.    SocHx:  reports that he has never smoked. His smokeless tobacco use includes chew. He reports current alcohol use. He reports that he does not use drugs.      Physical  Exam:  Vitals:    08/09/22 1545   BP: 123/82   Pulse: 71   Temp: 98.7 °F (37.1 °C)     General: A&Ox3, no apparent distress, no deformities  Neck: No masses, normal thyroid  Lungs: normal inspiration, no use of accessory muscles  Heart: normal pulse, no arrhythmias  Abdomen: Soft, NT, ND, no masses, no hernias, no hepatosplenomegaly  Lymphatic: Neck and groin nodes negative  Skin: The skin is warm and dry. No jaundice.  Labs/Studies:    Latest Reference Range & Units 03/07/20 11:33 03/15/21 10:45 07/21/22 09:56   Testosterone 250 - 1100 ng/dL 293 359 297     Impression/Plan:   Hypogonadism in males  Patient wants to begin with monthly injections and then see MD to discuss possible Testopel.  Prescription for testosterone 200 mg every 28 days sent to his local pharmacy.  We will review labs in 3 months and schedule him with MD to discuss Testopel implants.

## 2022-08-10 RX ORDER — NEEDLES, SAFETY 18GX1 1/2"
NEEDLE, DISPOSABLE MISCELLANEOUS
Qty: 3 EACH | Refills: 4 | Status: SHIPPED | OUTPATIENT
Start: 2022-08-10 | End: 2023-03-30

## 2022-08-10 RX ORDER — SYRINGE, DISPOSABLE, 3 ML
SYRINGE, EMPTY DISPOSABLE MISCELLANEOUS
Qty: 3 EACH | Refills: 4 | Status: SHIPPED | OUTPATIENT
Start: 2022-08-10 | End: 2023-03-30

## 2022-08-22 ENCOUNTER — PATIENT MESSAGE (OUTPATIENT)
Dept: UROLOGY | Facility: CLINIC | Age: 37
End: 2022-08-22
Payer: COMMERCIAL

## 2022-08-25 RX ORDER — TESTOSTERONE CYPIONATE 200 MG/ML
200 INJECTION, SOLUTION INTRAMUSCULAR
Qty: 10 ML | Refills: 5 | Status: SHIPPED | OUTPATIENT
Start: 2022-08-25 | End: 2023-01-18

## 2022-09-20 ENCOUNTER — TELEPHONE (OUTPATIENT)
Dept: PSYCHIATRY | Facility: CLINIC | Age: 37
End: 2022-09-20
Payer: COMMERCIAL

## 2022-09-20 ENCOUNTER — PATIENT MESSAGE (OUTPATIENT)
Dept: PSYCHIATRY | Facility: CLINIC | Age: 37
End: 2022-09-20
Payer: COMMERCIAL

## 2022-09-23 ENCOUNTER — OFFICE VISIT (OUTPATIENT)
Dept: PSYCHIATRY | Facility: CLINIC | Age: 37
End: 2022-09-23
Payer: COMMERCIAL

## 2022-09-23 DIAGNOSIS — F41.0 GENERALIZED ANXIETY DISORDER WITH PANIC ATTACKS: Primary | ICD-10-CM

## 2022-09-23 DIAGNOSIS — F41.1 GENERALIZED ANXIETY DISORDER WITH PANIC ATTACKS: Primary | ICD-10-CM

## 2022-09-23 PROCEDURE — 90833 PR PSYCHOTHERAPY W/PATIENT W/E&M, 30 MIN (ADD ON): ICD-10-PCS | Mod: 95,,, | Performed by: PSYCHIATRY & NEUROLOGY

## 2022-09-23 PROCEDURE — 90833 PSYTX W PT W E/M 30 MIN: CPT | Mod: 95,,, | Performed by: PSYCHIATRY & NEUROLOGY

## 2022-09-23 PROCEDURE — 99214 PR OFFICE/OUTPT VISIT, EST, LEVL IV, 30-39 MIN: ICD-10-PCS | Mod: 95,,, | Performed by: PSYCHIATRY & NEUROLOGY

## 2022-09-23 PROCEDURE — 99214 OFFICE O/P EST MOD 30 MIN: CPT | Mod: 95,,, | Performed by: PSYCHIATRY & NEUROLOGY

## 2022-09-23 RX ORDER — CLONAZEPAM 0.5 MG/1
0.5 TABLET ORAL 3 TIMES DAILY PRN
Qty: 90 TABLET | Refills: 2 | Status: SHIPPED | OUTPATIENT
Start: 2022-09-23 | End: 2022-12-26 | Stop reason: SDUPTHER

## 2022-09-23 RX ORDER — ESCITALOPRAM OXALATE 10 MG/1
TABLET ORAL
Qty: 30 TABLET | Refills: 2 | Status: SHIPPED | OUTPATIENT
Start: 2022-09-23 | End: 2023-03-30
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
## 2022-10-29 ENCOUNTER — PATIENT MESSAGE (OUTPATIENT)
Dept: PSYCHIATRY | Facility: CLINIC | Age: 37
End: 2022-10-29
Payer: COMMERCIAL

## 2022-11-01 ENCOUNTER — OFFICE VISIT (OUTPATIENT)
Dept: INTERNAL MEDICINE | Facility: CLINIC | Age: 37
End: 2022-11-01
Payer: COMMERCIAL

## 2022-11-01 ENCOUNTER — PATIENT MESSAGE (OUTPATIENT)
Dept: INTERNAL MEDICINE | Facility: CLINIC | Age: 37
End: 2022-11-01

## 2022-11-01 ENCOUNTER — HOSPITAL ENCOUNTER (OUTPATIENT)
Dept: RADIOLOGY | Facility: HOSPITAL | Age: 37
Discharge: HOME | End: 2022-11-01
Attending: FAMILY MEDICINE
Payer: COMMERCIAL

## 2022-11-01 VITALS
DIASTOLIC BLOOD PRESSURE: 84 MMHG | SYSTOLIC BLOOD PRESSURE: 130 MMHG | HEIGHT: 70 IN | OXYGEN SATURATION: 99 % | HEART RATE: 79 BPM | BODY MASS INDEX: 34.53 KG/M2 | WEIGHT: 241.19 LBS | TEMPERATURE: 98 F

## 2022-11-01 DIAGNOSIS — M79.644 PAIN IN FINGER OF RIGHT HAND: Primary | ICD-10-CM

## 2022-11-01 DIAGNOSIS — M79.644 PAIN IN FINGER OF RIGHT HAND: ICD-10-CM

## 2022-11-01 PROCEDURE — 3075F PR MOST RECENT SYSTOLIC BLOOD PRESS GE 130-139MM HG: ICD-10-PCS | Mod: CPTII,S$GLB,, | Performed by: FAMILY MEDICINE

## 2022-11-01 PROCEDURE — 3008F PR BODY MASS INDEX (BMI) DOCUMENTED: ICD-10-PCS | Mod: CPTII,S$GLB,, | Performed by: FAMILY MEDICINE

## 2022-11-01 PROCEDURE — 99213 OFFICE O/P EST LOW 20 MIN: CPT | Mod: S$GLB,,, | Performed by: FAMILY MEDICINE

## 2022-11-01 PROCEDURE — 1159F MED LIST DOCD IN RCRD: CPT | Mod: CPTII,S$GLB,, | Performed by: FAMILY MEDICINE

## 2022-11-01 PROCEDURE — 3079F PR MOST RECENT DIASTOLIC BLOOD PRESSURE 80-89 MM HG: ICD-10-PCS | Mod: CPTII,S$GLB,, | Performed by: FAMILY MEDICINE

## 2022-11-01 PROCEDURE — 99213 PR OFFICE/OUTPT VISIT, EST, LEVL III, 20-29 MIN: ICD-10-PCS | Mod: S$GLB,,, | Performed by: FAMILY MEDICINE

## 2022-11-01 PROCEDURE — 3008F BODY MASS INDEX DOCD: CPT | Mod: CPTII,S$GLB,, | Performed by: FAMILY MEDICINE

## 2022-11-01 PROCEDURE — 73130 X-RAY EXAM OF HAND: CPT | Mod: TC,FY,PO,RT

## 2022-11-01 PROCEDURE — 73130 X-RAY EXAM OF HAND: CPT | Mod: 26,RT,, | Performed by: RADIOLOGY

## 2022-11-01 PROCEDURE — 99999 PR PBB SHADOW E&M-EST. PATIENT-LVL IV: ICD-10-PCS | Mod: PBBFAC,,, | Performed by: FAMILY MEDICINE

## 2022-11-01 PROCEDURE — 1159F PR MEDICATION LIST DOCUMENTED IN MEDICAL RECORD: ICD-10-PCS | Mod: CPTII,S$GLB,, | Performed by: FAMILY MEDICINE

## 2022-11-01 PROCEDURE — 3075F SYST BP GE 130 - 139MM HG: CPT | Mod: CPTII,S$GLB,, | Performed by: FAMILY MEDICINE

## 2022-11-01 PROCEDURE — 99999 PR PBB SHADOW E&M-EST. PATIENT-LVL IV: CPT | Mod: PBBFAC,,, | Performed by: FAMILY MEDICINE

## 2022-11-01 PROCEDURE — 3079F DIAST BP 80-89 MM HG: CPT | Mod: CPTII,S$GLB,, | Performed by: FAMILY MEDICINE

## 2022-11-01 PROCEDURE — 73130 XR HAND COMPLETE 3 VIEW RIGHT: ICD-10-PCS | Mod: 26,RT,, | Performed by: RADIOLOGY

## 2022-11-01 RX ORDER — MELOXICAM 15 MG/1
15 TABLET ORAL DAILY
Qty: 7 TABLET | Refills: 0 | Status: SHIPPED | OUTPATIENT
Start: 2022-11-01 | End: 2023-01-27

## 2022-11-02 NOTE — PROGRESS NOTES
Subjective:      Patient ID: Rahul Farrar is a 37 y.o. male.    Chief Complaint: Hand Pain (Right ring finger)      Patient reports about 2 weeks ago had hyperextended his 4th finger of right hand. Since then having swelling, pain, deviation of PIP. Some improvement since this originally happened but still painful, not able to close his fist completely    Hand Pain     Review of Systems   Musculoskeletal:  Positive for arthralgias and joint swelling.   History reviewed. No pertinent past medical history.       Past Surgical History:   Procedure Laterality Date    FOREARM FRACTURE SURGERY      NOSE SURGERY      SINUS SURGERY      TYMPANOSTOMY TUBE PLACEMENT       Family History   Problem Relation Age of Onset    Diabetes Maternal Grandfather     Heart disease Maternal Grandfather      Social History     Socioeconomic History    Marital status:    Occupational History    Occupation: operation   Tobacco Use    Smoking status: Never    Smokeless tobacco: Current     Types: Chew   Substance and Sexual Activity    Alcohol use: Yes    Drug use: No    Sexual activity: Yes     Partners: Female     Social Determinants of Health     Financial Resource Strain: Low Risk     Difficulty of Paying Living Expenses: Not hard at all   Food Insecurity: No Food Insecurity    Worried About Running Out of Food in the Last Year: Never true    Ran Out of Food in the Last Year: Never true   Transportation Needs: No Transportation Needs    Lack of Transportation (Medical): No    Lack of Transportation (Non-Medical): No   Physical Activity: Insufficiently Active    Days of Exercise per Week: 3 days    Minutes of Exercise per Session: 30 min   Stress: Stress Concern Present    Feeling of Stress : To some extent   Social Connections: Unknown    Frequency of Communication with Friends and Family: More than three times a week    Frequency of Social Gatherings with Friends and Family: Once a week    Active Member of Clubs or  "Organizations: Yes    Attends Club or Organization Meetings: Patient refused    Marital Status:    Housing Stability: Unknown    Unable to Pay for Housing in the Last Year: No    Unstable Housing in the Last Year: No     Review of patient's allergies indicates:  No Known Allergies    Objective:       /84 (BP Location: Right arm, Patient Position: Sitting, BP Method: Large (Manual))   Pulse 79   Temp 98.1 °F (36.7 °C) (Temporal)   Ht 5' 10" (1.778 m)   Wt 109.4 kg (241 lb 2.9 oz)   SpO2 99%   BMI 34.61 kg/m²   Physical Exam  Constitutional:       General: He is not in acute distress.     Appearance: Normal appearance. He is well-developed. He is not ill-appearing or diaphoretic.   Musculoskeletal:         General: Swelling (4th finger PIP) and tenderness (PIP 4th finger) present.   Neurological:      General: No focal deficit present.      Mental Status: He is alert and oriented to person, place, and time.   Psychiatric:         Mood and Affect: Mood normal.         Behavior: Behavior normal.         Thought Content: Thought content normal.         Judgment: Judgment normal.     Assessment:     1. Pain in finger of right hand      Plan:   Pain in finger of right hand  -     X-Ray Hand Complete Right; Future; Expected date: 11/01/2022    Other orders  -     meloxicam (MOBIC) 15 MG tablet; Take 1 tablet (15 mg total) by mouth once daily. Take with meal  Dispense: 7 tablet; Refill: 0    Xray today: NAF  Recommended he take NSAID 7 days, try to rest the joint, let me know if swelling/pain worsens or persists  Medication List with Changes/Refills   New Medications    MELOXICAM (MOBIC) 15 MG TABLET    Take 1 tablet (15 mg total) by mouth once daily. Take with meal   Current Medications    CLONAZEPAM (KLONOPIN) 0.5 MG TABLET    Take 1 tablet (0.5 mg total) by mouth 3 (three) times daily as needed for Anxiety.    ESCITALOPRAM OXALATE (LEXAPRO) 10 MG TABLET    Take 1/2 tablet daily for 7 days then 1 daily. " "   MULTIVITAMIN (THERAGRAN) PER TABLET    Take 1 tablet by mouth.    NEEDLE, DISP, 21 G 21 GAUGE X 1 1/2" NDLE    Use with syringe to self administer Depotestosterone    SAFETY NEEDLES (BD SAFETYGLIDE NEEDLE) 18 GAUGE X 1 1/2" NDLE    Use to self administer depotestosterone    SYRINGE, DISPOSABLE, (BD LUER-MARTIN SYRINGE) 3 ML SYRG    Use syringes along with needles to self administer depotestosterone    TESTOSTERONE CYPIONATE (DEPOTESTOTERONE CYPIONATE) 200 MG/ML INJECTION    Inject 1 mL (200 mg total) into the muscle every 14 (fourteen) days.       "

## 2022-11-09 ENCOUNTER — LAB VISIT (OUTPATIENT)
Dept: LAB | Facility: HOSPITAL | Age: 37
End: 2022-11-09
Attending: NURSE PRACTITIONER
Payer: COMMERCIAL

## 2022-11-09 DIAGNOSIS — E29.1 TESTICULAR HYPOGONADISM: ICD-10-CM

## 2022-11-09 LAB
ALBUMIN SERPL BCP-MCNC: 4 G/DL (ref 3.5–5.2)
ALP SERPL-CCNC: 48 U/L (ref 55–135)
ALT SERPL W/O P-5'-P-CCNC: 22 U/L (ref 10–44)
AST SERPL-CCNC: 19 U/L (ref 10–40)
BILIRUB DIRECT SERPL-MCNC: 0.3 MG/DL (ref 0.1–0.3)
BILIRUB SERPL-MCNC: 1.2 MG/DL (ref 0.1–1)
HCT VFR BLD AUTO: 49.9 % (ref 40–54)
PROT SERPL-MCNC: 6.5 G/DL (ref 6–8.4)

## 2022-11-09 PROCEDURE — 36415 COLL VENOUS BLD VENIPUNCTURE: CPT | Performed by: NURSE PRACTITIONER

## 2022-11-09 PROCEDURE — 82040 ASSAY OF SERUM ALBUMIN: CPT | Mod: 59 | Performed by: NURSE PRACTITIONER

## 2022-11-09 PROCEDURE — 85014 HEMATOCRIT: CPT | Performed by: NURSE PRACTITIONER

## 2022-11-09 PROCEDURE — 80076 HEPATIC FUNCTION PANEL: CPT | Performed by: NURSE PRACTITIONER

## 2022-11-16 LAB
ALBUMIN SERPL-MCNC: 4.1 G/DL (ref 3.6–5.1)
SHBG SERPL-SCNC: 11 NMOL/L (ref 10–50)
TESTOST FREE SERPL-MCNC: 107.3 PG/ML (ref 46–224)
TESTOST SERPL-MCNC: 374 NG/DL (ref 250–1100)
TESTOSTERONE.FREE+WB SERPL-MCNC: 202 NG/DL (ref 110–575)

## 2022-11-17 ENCOUNTER — PATIENT MESSAGE (OUTPATIENT)
Dept: UROLOGY | Facility: CLINIC | Age: 37
End: 2022-11-17
Payer: COMMERCIAL

## 2022-11-17 DIAGNOSIS — E29.1 TESTICULAR HYPOGONADISM: Primary | ICD-10-CM

## 2022-11-21 ENCOUNTER — PATIENT MESSAGE (OUTPATIENT)
Dept: PSYCHIATRY | Facility: CLINIC | Age: 37
End: 2022-11-21
Payer: COMMERCIAL

## 2022-11-22 ENCOUNTER — OFFICE VISIT (OUTPATIENT)
Dept: PSYCHIATRY | Facility: CLINIC | Age: 37
End: 2022-11-22
Payer: COMMERCIAL

## 2022-11-22 DIAGNOSIS — Z86.59 HISTORY OF POSTTRAUMATIC STRESS DISORDER (PTSD): ICD-10-CM

## 2022-11-22 DIAGNOSIS — F41.1 GENERALIZED ANXIETY DISORDER WITH PANIC ATTACKS: Primary | ICD-10-CM

## 2022-11-22 DIAGNOSIS — F41.0 GENERALIZED ANXIETY DISORDER WITH PANIC ATTACKS: Primary | ICD-10-CM

## 2022-11-22 PROCEDURE — 90791 PSYCH DIAGNOSTIC EVALUATION: CPT | Mod: 95,,, | Performed by: SOCIAL WORKER

## 2022-11-22 PROCEDURE — 90791 PR PSYCHIATRIC DIAGNOSTIC EVALUATION: ICD-10-PCS | Mod: 95,,, | Performed by: SOCIAL WORKER
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
## 2022-12-07 ENCOUNTER — TELEPHONE (OUTPATIENT)
Dept: PSYCHIATRY | Facility: CLINIC | Age: 37
End: 2022-12-07

## 2023-01-18 ENCOUNTER — PATIENT MESSAGE (OUTPATIENT)
Dept: INTERNAL MEDICINE | Facility: CLINIC | Age: 38
End: 2023-01-18
Payer: COMMERCIAL

## 2023-01-18 ENCOUNTER — PROCEDURE VISIT (OUTPATIENT)
Dept: UROLOGY | Facility: CLINIC | Age: 38
End: 2023-01-18
Payer: COMMERCIAL

## 2023-01-18 VITALS
SYSTOLIC BLOOD PRESSURE: 134 MMHG | WEIGHT: 241 LBS | BODY MASS INDEX: 34.58 KG/M2 | DIASTOLIC BLOOD PRESSURE: 85 MMHG | HEART RATE: 87 BPM

## 2023-01-18 DIAGNOSIS — N45.3 EPIDIDYMO-ORCHITIS: ICD-10-CM

## 2023-01-18 DIAGNOSIS — Z30.09 VASECTOMY EVALUATION: ICD-10-CM

## 2023-01-18 DIAGNOSIS — M79.644 PAIN IN FINGER OF RIGHT HAND: Primary | ICD-10-CM

## 2023-01-18 DIAGNOSIS — E29.1 TESTICULAR HYPOGONADISM: Primary | ICD-10-CM

## 2023-01-18 PROCEDURE — 11980 PR IMPLANT,HORMONE,SUBCUTANEOUS: ICD-10-PCS | Mod: S$GLB,,, | Performed by: UROLOGY

## 2023-01-18 PROCEDURE — 11980 IMPLANT HORMONE PELLET(S): CPT | Mod: S$GLB,,, | Performed by: UROLOGY

## 2023-01-18 PROCEDURE — S0189 TESTOSTERONE PELLET 75 MG: HCPCS | Mod: S$GLB,,, | Performed by: UROLOGY

## 2023-01-18 PROCEDURE — S0189 PR TESTOSTERONE PELLET 75 MG: ICD-10-PCS | Mod: S$GLB,,, | Performed by: UROLOGY

## 2023-01-18 RX ORDER — LEVOFLOXACIN 500 MG/1
500 TABLET, FILM COATED ORAL DAILY
Qty: 3 TABLET | Refills: 0 | Status: SHIPPED | OUTPATIENT
Start: 2023-01-18 | End: 2023-01-21

## 2023-01-18 RX ORDER — OXYCODONE AND ACETAMINOPHEN 5; 325 MG/1; MG/1
1 TABLET ORAL EVERY 4 HOURS PRN
Qty: 25 TABLET | Refills: 0 | Status: SHIPPED | OUTPATIENT
Start: 2023-01-18

## 2023-01-18 RX ORDER — PROMETHAZINE HYDROCHLORIDE 25 MG/1
25 TABLET ORAL ONCE
Qty: 2 TABLET | Refills: 0 | Status: SHIPPED | OUTPATIENT
Start: 2023-01-18 | End: 2023-01-18

## 2023-01-18 RX ORDER — SULFAMETHOXAZOLE AND TRIMETHOPRIM 800; 160 MG/1; MG/1
1 TABLET ORAL 2 TIMES DAILY
Qty: 10 TABLET | Refills: 0 | Status: SHIPPED | OUTPATIENT
Start: 2023-01-18 | End: 2023-07-14

## 2023-01-18 RX ORDER — DIAZEPAM 5 MG/1
5 TABLET ORAL ONCE
Qty: 1 TABLET | Refills: 0 | Status: SHIPPED | OUTPATIENT
Start: 2023-01-18 | End: 2023-05-25

## 2023-01-18 NOTE — PROCEDURES
Procedures  Chief Complaint:   Encounter Diagnoses   Name Primary?    Testicular hypogonadism Yes    Epididymo-orchitis          HPI:   1/18/23- patient is here to see me for the 1st time today, he is here to have his 1st testosterone pellets inserted.  Known history of epididymo-orchitis, no evidence recurrence though.  Patient is also interested in pursuing a vasectomy, they have 2 children.  7/20/22- LR- Patient is a 36-year-old male that is presenting with concerns of hypogonadism.  Patient was seen several years ago and total T was at the low-side of normal. Patient states that he and his wife are no longer wanting any more children and he would like to discuss possible testosterone replacement therapy.  No labs in the last 16 months.  Patient states that he has had fatigue and energy level has greatly decreased.Normal sexual function.       Allergies:  Patient has no known allergies.    Medications:  has a current medication list which includes the following prescription(s): clonazepam, multivitamin, needle (disp) 21 g, safety needles, syringe (disposable), testosterone cypionate, escitalopram oxalate, and meloxicam.    Review of Systems:  General: No fever, chills, fatigability, or weight loss.  Skin: No rashes, itching, or changes in color or texture of skin.  Chest: Denies LAKHANI, cyanosis, wheezing, cough, and sputum production.  Abdomen: Appetite fine. No weight loss. Denies diarrhea, abdominal pain, hematemesis, or blood in stool.  Musculoskeletal: No joint stiffness or swelling. Denies back pain.  : As above.  All other review of systems negative.    PMH:  Hypogonadism in males    PSH:   has a past surgical history that includes Nose surgery; Forearm fracture surgery; Tympanostomy tube placement; and Sinus surgery.    FamHx: family history includes Diabetes in his maternal grandfather; Heart disease in his maternal grandfather.    SocHx:  reports that he has never smoked. His smokeless tobacco use  includes chew. He reports current alcohol use. He reports that he does not use drugs.      Physical Exam:  Vitals:    01/18/23 0942   BP: 134/85   Pulse: 87     General: A&Ox3, no apparent distress, no deformities  Neck: No masses, normal ROM  Lungs: normal inspiration, no use of accessory muscles  Heart: normal pulse, no arrhythmias  Abdomen: Soft, NT, ND, no masses, no hernias, no hepatosplenomegaly  Skin: The skin is warm and dry. No jaundice.  Ext: No c/c/e.    Labs/Studies:   Testopel  1/18/23  Testosterone 297 7/22    Patient was sterilely prepped and draped, then positioned in the left side up, lateral decubitus position.  Lidocaine was used for local anesthesia.  Eleven blade was used to incise the skin, included trocars with the testopel kit were then used.  They were inserted within the incision site and we placed the pellets in a V location.  10 pellets total were inserted without difficulty.  Trocars were removed and pressure was applied for 2 min.  Steri-Strips applied for local coverage, he will return for lab assessment in 3 months.      Impression/Plan:     1. Hypogonadism- patient tolerated insertion of testopel well, call with any complaints.  Otherwise see me in 3 months and determine interval.      2. Epididymo-orchitis- call with any evidence of recurrence.    3. Desired infertility- I discussed with the patient risks and benefits, as well as alternatives. We discussed possible complications at length, including fever, infection, bleeding (possibly requiring re-operation), testicular injury or atrophy with loss of function, chronic pain, persistent or recurrent presence of sperm in the ejaculate, or vasal recanalization, as well as the risks attendant to the anesthetic.  We discussed that he should stop aspirin, ibuprofen, and similar products at least one week prior to the procedure. Acetaminophen is okay to use for headaches, pain, etc.  He should bring an athletic supporter and loose-fitting  sweat pants on the day of the procedure.  We discussed that he must continue to use contraception until two consecutive semen analyses (checked at approximately 2-3 months post-vasectomy) reveal azoospermia.  He will schedule an elective vasectomy.

## 2023-01-25 ENCOUNTER — PATIENT MESSAGE (OUTPATIENT)
Dept: UROLOGY | Facility: CLINIC | Age: 38
End: 2023-01-25
Payer: COMMERCIAL

## 2023-01-26 NOTE — PROGRESS NOTES
"  SUBJECTIVE:      Chief Complaint: Pain of the Right Hand    Referring Provider: Nain Long MD     History of Present Illness:  Patient is a 37 y.o. male who presents today with complaints of right hand pain.   Onset of symptoms was about 7 weeks ago. Patient was at [facility] and injured his finger.  He states that initially the finger was swollen and bruised but that has since improved.  He was evaluated by his PCP on 11/01/2022, x-rays were normal, and was given Mobic for the pain.  Today, pain is 3/10. The location of the pain is right ring finger PIP joint.  States that he has difficulty making a fist and has mild tenderness to the joint.  He has been icing the finger for relief.  He is not taking any medication for pain.  The patient denies any fevers, chills, N/V, D/C and presents for evaluation.    History reviewed. No pertinent past medical history.  Past Surgical History:   Procedure Laterality Date    FOREARM FRACTURE SURGERY      NOSE SURGERY      SINUS SURGERY      TYMPANOSTOMY TUBE PLACEMENT       Review of patient's allergies indicates:  No Known Allergies  Social History     Social History Narrative    Not on file     Family History   Problem Relation Age of Onset    Diabetes Maternal Grandfather     Heart disease Maternal Grandfather          Current Outpatient Medications:     clonazePAM (KLONOPIN) 0.5 MG tablet, Take 1 tablet (0.5 mg total) by mouth 3 (three) times daily as needed for Anxiety., Disp: 90 tablet, Rfl: 1    multivitamin (THERAGRAN) per tablet, Take 1 tablet by mouth., Disp: , Rfl:     needle, disp, 21 G 21 gauge x 1 1/2" Ndle, Use with syringe to self administer Depotestosterone, Disp: 3 each, Rfl: 4    oxyCODONE-acetaminophen (PERCOCET) 5-325 mg per tablet, Take 1 tablet by mouth every 4 (four) hours as needed for Pain., Disp: 25 tablet, Rfl: 0    safety needles (BD SAFETYGLIDE NEEDLE) 18 gauge x 1 1/2" Ndle, Use to self administer depotestosterone, Disp: 3 each, " "Rfl: 4    sulfamethoxazole-trimethoprim 800-160mg (BACTRIM DS) 800-160 mg Tab, Take 1 tablet by mouth 2 (two) times daily., Disp: 10 tablet, Rfl: 0    syringe, disposable, (BD LUER-MARTIN SYRINGE) 3 mL Syrg, Use syringes along with needles to self administer depotestosterone, Disp: 3 each, Rfl: 4    diazePAM (VALIUM) 5 MG tablet, Take 1 tablet (5 mg total) by mouth once. for 1 dose, Disp: 1 tablet, Rfl: 0    EScitalopram oxalate (LEXAPRO) 10 MG tablet, Take 1/2 tablet daily for 7 days then 1 daily. (Patient not taking: Reported on 1/18/2023), Disp: 30 tablet, Rfl: 2    meloxicam (MOBIC) 15 MG tablet, Take 1 tablet (15 mg total) by mouth once daily. Take with meal, Disp: 7 tablet, Rfl: 0      Review of Systems:  Constitutional: Negative for chills and fever.   Respiratory: Negative for cough and shortness of breath.    Gastrointestinal: Negative for nausea and vomiting.   Skin: Negative for rash.   Neurological: Negative for dizziness and headaches.   Psychiatric/Behavioral: Negative for depression.   MSK as in HPI     OBJECTIVE:      Vital Signs (Most Recent):  Vitals:    01/27/23 0841   Weight: 109.3 kg (240 lb 15.4 oz)   Height: 5' 10" (1.778 m)     Body mass index is 34.57 kg/m².      Physical Exam:  Constitutional: The patient appears well-developed and well-nourished. No distress.   Skin: No lesions appreciated  Head: Normocephalic and atraumatic.   Nose: Nose normal.   Ears: No deformities seen  Eyes: Conjunctivae and EOM are normal.   Neck: No tracheal deviation present.   Cardiovascular: Normal rate and intact distal pulses.    Pulmonary/Chest: Effort normal. No respiratory distress.   Abdominal: There is no guarding.   Neurological: The patient is alert.   Psychiatric: The patient has a normal mood and affect.     General    Vitals reviewed.            Right Hand/Wrist Exam     Inspection   Scars: Wrist - absent Hand -  absent  Effusion: Wrist - absent Hand -  absent    Pain   Hand - The patient exhibits pain " of the ring IP.    Swelling   Hand - The patient is swollen on the ring IP.    Other     Neuorologic Exam    Median Distribution: normal  Ulnar Distribution: normal  Radial Distribution: normal    Comments:  Mild tenderness to palpation at the ring finger PIP joint  Able to passively flex ring finger to palm  Ring finger is NVI  No motor or sensory deficits   No signs of infection          Vascular Exam       Capillary Refill  Right Hand: normal capillary refill        Diagnostic Results:    EXAMINATION:  XR HAND COMPLETE 3 VIEW RIGHT     CLINICAL HISTORY:  Pain in right finger(s)     TECHNIQUE:  PA, lateral, and oblique views of the right hand were performed.     COMPARISON:  None     FINDINGS:  No acute fracture or dislocation.  There is some soft tissue swelling surrounding the PIP joint of the 4th digit.  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  Incompletely visualized plate seen across the shaft of the distal radius.     Impression:     As above        Electronically signed by: Mirza Abdi DO  Date:                                            11/01/2022  Time:                                           14:52     Imaging - I independently viewed the patient's imaging as well as the radiology report.  Xrays of the patient's right hand demonstrate no evidence of any acute fractures or dislocations or significant degenerative changes.      ASSESSMENT/PLAN:        ICD-10-CM ICD-9-CM   1. Sprain of interphalangeal joint of right ring finger, initial encounter  S63.634A 842.13   2. Pain in finger of right hand  M79.644 729.5     Orders Placed This Encounter    Small Joint Aspiration/Injection: R ring PIP     Orders Placed This Encounter   Procedures    Small Joint Aspiration/Injection: R ring PIP     Plan:     -right ring finger PIP joint injection today.  Patient must wait at least 3 months between injections  -x-rays reviewed, show no acute changes  -recommend buddy taping the finger as needed  -continue  conservative treatment:  Ice/heat as needed, NSAIDs as tolerated  -follow-up as needed    PROCEDURE:  I have explained the risks, benefits, and alternatives of the procedure in detail.  The patient voices understanding and all questions have been answered.  The patient agrees to proceed as planned. So after a sterile prep of the skin in the normal fashion the right ring finger PIP joint is injected from the radial approach using a 25 gauge needle with a combination of .25cc 2% plain lidocaine and .25cc of kenalog.  The patient is cautioned and immediate relief of pain is secondary to the local anesthetic and will be temporary.  After the anesthetic wears off there may be a increase in pain that may last for a few hours or a few days and they should use ice to help alleviate this flare up of pain. Patient tolerated the procedure well.       Should the patient's symptoms worsen, persist, or fail to improve they should return for reevaluation and I would be happy to see them back anytime.        Loretta Laws PA-C   [facility]     Please be aware that this note has been generated with the assistance of Lexi voice-to-text.  Please excuse any spelling or grammatical errors.

## 2023-01-27 ENCOUNTER — OFFICE VISIT (OUTPATIENT)
Dept: ORTHOPEDICS | Facility: CLINIC | Age: 38
End: 2023-01-27
Payer: COMMERCIAL

## 2023-01-27 VITALS — WEIGHT: 240.94 LBS | HEIGHT: 70 IN | BODY MASS INDEX: 34.49 KG/M2

## 2023-01-27 DIAGNOSIS — M79.644 PAIN IN FINGER OF RIGHT HAND: ICD-10-CM

## 2023-01-27 DIAGNOSIS — S63.634A SPRAIN OF INTERPHALANGEAL JOINT OF RIGHT RING FINGER, INITIAL ENCOUNTER: Primary | ICD-10-CM

## 2023-01-27 PROCEDURE — 20600 SMALL JOINT ASPIRATION/INJECTION: R RING PIP: ICD-10-PCS | Mod: RT,S$GLB,,

## 2023-01-27 PROCEDURE — 99999 PR PBB SHADOW E&M-EST. PATIENT-LVL III: ICD-10-PCS | Mod: PBBFAC,,,

## 2023-01-27 PROCEDURE — 3008F PR BODY MASS INDEX (BMI) DOCUMENTED: ICD-10-PCS | Mod: CPTII,S$GLB,,

## 2023-01-27 PROCEDURE — 20600 DRAIN/INJ JOINT/BURSA W/O US: CPT | Mod: RT,S$GLB,,

## 2023-01-27 PROCEDURE — 1159F PR MEDICATION LIST DOCUMENTED IN MEDICAL RECORD: ICD-10-PCS | Mod: CPTII,S$GLB,,

## 2023-01-27 PROCEDURE — 99999 PR PBB SHADOW E&M-EST. PATIENT-LVL III: CPT | Mod: PBBFAC,,,

## 2023-01-27 PROCEDURE — 3008F BODY MASS INDEX DOCD: CPT | Mod: CPTII,S$GLB,,

## 2023-01-27 PROCEDURE — 99203 PR OFFICE/OUTPT VISIT, NEW, LEVL III, 30-44 MIN: ICD-10-PCS | Mod: 25,S$GLB,,

## 2023-01-27 PROCEDURE — 99203 OFFICE O/P NEW LOW 30 MIN: CPT | Mod: 25,S$GLB,,

## 2023-01-27 PROCEDURE — 1159F MED LIST DOCD IN RCRD: CPT | Mod: CPTII,S$GLB,,

## 2023-01-27 RX ORDER — TRIAMCINOLONE ACETONIDE 40 MG/ML
40 INJECTION, SUSPENSION INTRA-ARTICULAR; INTRAMUSCULAR
Status: DISCONTINUED | OUTPATIENT
Start: 2023-01-27 | End: 2023-01-27 | Stop reason: HOSPADM

## 2023-01-27 RX ADMIN — TRIAMCINOLONE ACETONIDE 40 MG: 40 INJECTION, SUSPENSION INTRA-ARTICULAR; INTRAMUSCULAR at 08:01

## 2023-01-27 NOTE — PROCEDURES
Small Joint Aspiration/Injection: R ring PIP    Date/Time: 1/27/2023 8:30 AM  Performed by: Loretta Laws PA-C  Authorized by: Loretta Laws PA-C     Consent Done?:  Yes (Verbal)  Indications:  Pain  Site marked: the procedure site was marked    Timeout: prior to procedure the correct patient, procedure, and site was verified    Prep: patient was prepped and draped in usual sterile fashion      Local anesthesia used?: Yes    Local anesthetic:  Lidocaine 2% without epinephrine  Anesthetic total (ml):  0.25    Location:  Ring finger  Site:  R ring PIP  Ultrasonic guidance for needle placement?: No    Needle size:  25 G  Approach:  Radial  Medications:  40 mg triamcinolone acetonide 40 mg/mL  Patient tolerance:  Patient tolerated the procedure well with no immediate complications

## 2023-02-01 ENCOUNTER — TELEPHONE (OUTPATIENT)
Dept: UROLOGY | Facility: CLINIC | Age: 38
End: 2023-02-01
Payer: COMMERCIAL

## 2023-02-06 ENCOUNTER — TELEPHONE (OUTPATIENT)
Dept: UROLOGY | Facility: CLINIC | Age: 38
End: 2023-02-06
Payer: COMMERCIAL

## 2023-02-06 DIAGNOSIS — E29.1 TESTICULAR HYPOGONADISM: Primary | ICD-10-CM

## 2023-02-06 NOTE — TELEPHONE ENCOUNTER
Returned call to pt; he wanted to know how many testosterone pellets were inserted during the visit; I provided that information to pt and he had lots of questions regarding the difference between the pellets and the injections, etc; pt requesting a call from MD.

## 2023-03-16 ENCOUNTER — OFFICE VISIT (OUTPATIENT)
Dept: INTERNAL MEDICINE | Facility: CLINIC | Age: 38
End: 2023-03-16
Payer: COMMERCIAL

## 2023-03-16 VITALS
BODY MASS INDEX: 33.7 KG/M2 | SYSTOLIC BLOOD PRESSURE: 126 MMHG | HEART RATE: 93 BPM | WEIGHT: 235.44 LBS | DIASTOLIC BLOOD PRESSURE: 84 MMHG | TEMPERATURE: 98 F | OXYGEN SATURATION: 96 % | HEIGHT: 70 IN

## 2023-03-16 DIAGNOSIS — R10.11 RUQ PAIN: ICD-10-CM

## 2023-03-16 DIAGNOSIS — R52 BODY ACHES: Primary | ICD-10-CM

## 2023-03-16 LAB
CTP QC/QA: YES
CTP QC/QA: YES
POC MOLECULAR INFLUENZA A AGN: NEGATIVE
POC MOLECULAR INFLUENZA B AGN: NEGATIVE
SARS-COV-2 RDRP RESP QL NAA+PROBE: NEGATIVE

## 2023-03-16 PROCEDURE — 87635 SARS-COV-2 COVID-19 AMP PRB: CPT | Mod: QW,S$GLB,, | Performed by: FAMILY MEDICINE

## 2023-03-16 PROCEDURE — 87502 POCT INFLUENZA A/B MOLECULAR: ICD-10-PCS | Mod: QW,S$GLB,, | Performed by: FAMILY MEDICINE

## 2023-03-16 PROCEDURE — 87635: ICD-10-PCS | Mod: QW,S$GLB,, | Performed by: FAMILY MEDICINE

## 2023-03-16 PROCEDURE — 87502 INFLUENZA DNA AMP PROBE: CPT | Mod: QW,S$GLB,, | Performed by: FAMILY MEDICINE

## 2023-03-16 PROCEDURE — 99999 PR PBB SHADOW E&M-EST. PATIENT-LVL IV: ICD-10-PCS | Mod: PBBFAC,,, | Performed by: FAMILY MEDICINE

## 2023-03-16 PROCEDURE — 99214 OFFICE O/P EST MOD 30 MIN: CPT | Mod: S$GLB,,, | Performed by: FAMILY MEDICINE

## 2023-03-16 PROCEDURE — 99214 PR OFFICE/OUTPT VISIT, EST, LEVL IV, 30-39 MIN: ICD-10-PCS | Mod: S$GLB,,, | Performed by: FAMILY MEDICINE

## 2023-03-16 PROCEDURE — 99999 PR PBB SHADOW E&M-EST. PATIENT-LVL IV: CPT | Mod: PBBFAC,,, | Performed by: FAMILY MEDICINE

## 2023-03-17 ENCOUNTER — TELEPHONE (OUTPATIENT)
Dept: UROLOGY | Facility: CLINIC | Age: 38
End: 2023-03-17
Payer: COMMERCIAL

## 2023-03-17 NOTE — TELEPHONE ENCOUNTER
Returned call to pt     ----- Message from Willa Rios sent at 3/17/2023  4:24 PM CDT -----  Contact: Rahul  Type:  Patient Returning Call    Who Called:Rahul  Who Left Message for Patient:Amara Mullins MA   Does the patient know what this is regarding?:scheduling  Would the patient rather a call back or a response via [facility]? Call back  Best Call Back Number:[phone]  Additional Information:Requests a call back

## 2023-03-17 NOTE — TELEPHONE ENCOUNTER
Returned call to pt to let him know if he reschedules his appt the next available will be in May. Pt did not answer   ----- Message from Tommy Oliva sent at 3/17/2023  9:54 AM CDT -----  Contact: Rahul  Patient is calling to discuss rescheduling 3/29 vasectomy procedure due to schedule change for work. Patient is requesting a date closer to the weekend,a Thursday or Friday If possible. Please give patient a call back at .[phone]

## 2023-03-19 NOTE — PROGRESS NOTES
"Subjective:      Patient ID: Rahul Farrar is a 37 y.o. male.    Chief Complaint: Abdominal Pain, Generalized Body Aches, Chills, and Cough      Patient reports body aches, chills, nausea, sinus congestion, abdominal pain, coughing, runny nose periods.  He reports sudden onset 3 days ago, had gone to urgent care yesterday and had negative COVID and flu tests - was told he might have a gallbladder problem and needs to follow-up.    Abdominal Pain  Associated symptoms include myalgias. Pertinent negatives include no constipation, diarrhea or nausea.   Cough  Associated symptoms include chills and myalgias.   Review of Systems   Constitutional:  Positive for chills and fatigue.   HENT:  Positive for congestion and sinus pain.    Respiratory:  Positive for cough.    Gastrointestinal:  Positive for abdominal pain. Negative for constipation, diarrhea and nausea.   Musculoskeletal:  Positive for myalgias.   History reviewed. No pertinent past medical history.       Past Surgical History:   Procedure Laterality Date    FOREARM FRACTURE SURGERY      NOSE SURGERY      SINUS SURGERY      TYMPANOSTOMY TUBE PLACEMENT       Family History   Problem Relation Age of Onset    Diabetes Maternal Grandfather     Heart disease Maternal Grandfather      Social History     Socioeconomic History    Marital status:     Number of children: 1   Occupational History    Occupation: operation   Tobacco Use    Smoking status: Never    Smokeless tobacco: Former     Types: Chew   Substance and Sexual Activity    Alcohol use: Yes    Drug use: No    Sexual activity: Yes     Partners: Female     Review of patient's allergies indicates:  No Known Allergies    Objective:       /84 (BP Location: Right arm, Patient Position: Sitting, BP Method: Large (Manual))   Pulse 93   Temp 97.5 °F (36.4 °C) (Tympanic)   Ht 5' 10" (1.778 m)   Wt 106.8 kg (235 lb 7.2 oz)   SpO2 96%   BMI 33.78 kg/m²   Physical Exam  Vitals and nursing note " reviewed.   Constitutional:       General: He is not in acute distress.     Appearance: He is well-developed. He is not diaphoretic.   HENT:      Head: Normocephalic.      Right Ear: Hearing, tympanic membrane, ear canal and external ear normal.      Left Ear: Hearing, tympanic membrane, ear canal and external ear normal.      Nose: Mucosal edema present.      Right Sinus: No maxillary sinus tenderness or frontal sinus tenderness.      Left Sinus: No maxillary sinus tenderness or frontal sinus tenderness.      Mouth/Throat:      Pharynx: Uvula midline. Posterior oropharyngeal erythema present.   Eyes:      Conjunctiva/sclera: Conjunctivae normal.      Pupils: Pupils are equal, round, and reactive to light.   Cardiovascular:      Rate and Rhythm: Normal rate and regular rhythm.      Heart sounds: Normal heart sounds.   Pulmonary:      Effort: Pulmonary effort is normal. No respiratory distress.      Breath sounds: Normal breath sounds.   Abdominal:      General: Bowel sounds are normal.      Palpations: Abdomen is soft.      Tenderness: There is abdominal tenderness (Right upper quadrant). There is no guarding or rebound.   Lymphadenopathy:      Cervical: No cervical adenopathy.   Skin:     General: Skin is warm and dry.   Neurological:      Mental Status: He is alert and oriented to person, place, and time.   Psychiatric:         Mood and Affect: Mood normal.         Behavior: Behavior normal.     Assessment:     1. Body aches    2. RUQ pain      Plan:   Body aches  -     POCT Influenza A/B Molecular  -     POCT COVID-19 Rapid Screening    RUQ pain  -     X-Ray Abdomen Flat And Erect; Future; Expected date: 03/16/2023  -     US Abdomen Complete; Future; Expected date: 03/16/2023    COVID and flu test today negative  Will schedule for x-ray when available, ultrasound   Medication List with Changes/Refills   Current Medications    CLONAZEPAM (KLONOPIN) 0.5 MG TABLET    Take 1 tablet (0.5 mg total) by mouth 3 (three)  "times daily as needed for Anxiety.    DIAZEPAM (VALIUM) 5 MG TABLET    Take 1 tablet (5 mg total) by mouth once. for 1 dose    ESCITALOPRAM OXALATE (LEXAPRO) 10 MG TABLET    Take 1/2 tablet daily for 7 days then 1 daily.    MULTIVITAMIN (THERAGRAN) PER TABLET    Take 1 tablet by mouth.    NEEDLE, DISP, 21 G 21 GAUGE X 1 1/2" NDLE    Use with syringe to self administer Depotestosterone    OXYCODONE-ACETAMINOPHEN (PERCOCET) 5-325 MG PER TABLET    Take 1 tablet by mouth every 4 (four) hours as needed for Pain.    SAFETY NEEDLES (BD SAFETYGLIDE NEEDLE) 18 GAUGE X 1 1/2" NDLE    Use to self administer depotestosterone    SULFAMETHOXAZOLE-TRIMETHOPRIM 800-160MG (BACTRIM DS) 800-160 MG TAB    Take 1 tablet by mouth 2 (two) times daily.    SYRINGE, DISPOSABLE, (BD LUER-MARTIN SYRINGE) 3 ML SYRG    Use syringes along with needles to self administer depotestosterone       "

## 2023-03-20 ENCOUNTER — OFFICE VISIT (OUTPATIENT)
Dept: INTERNAL MEDICINE | Facility: CLINIC | Age: 38
End: 2023-03-20
Payer: COMMERCIAL

## 2023-03-20 ENCOUNTER — HOSPITAL ENCOUNTER (OUTPATIENT)
Dept: RADIOLOGY | Facility: HOSPITAL | Age: 38
Discharge: HOME | End: 2023-03-20
Attending: FAMILY MEDICINE
Payer: COMMERCIAL

## 2023-03-20 VITALS
HEIGHT: 70 IN | HEART RATE: 80 BPM | DIASTOLIC BLOOD PRESSURE: 80 MMHG | SYSTOLIC BLOOD PRESSURE: 126 MMHG | RESPIRATION RATE: 20 BRPM | WEIGHT: 229.25 LBS | BODY MASS INDEX: 32.82 KG/M2 | TEMPERATURE: 97 F | OXYGEN SATURATION: 99 %

## 2023-03-20 DIAGNOSIS — R10.11 RUQ PAIN: ICD-10-CM

## 2023-03-20 DIAGNOSIS — R10.10 UPPER ABDOMINAL PAIN: Primary | ICD-10-CM

## 2023-03-20 PROCEDURE — 99999 PR PBB SHADOW E&M-EST. PATIENT-LVL IV: ICD-10-PCS | Mod: PBBFAC,,, | Performed by: INTERNAL MEDICINE

## 2023-03-20 PROCEDURE — 74019 RADEX ABDOMEN 2 VIEWS: CPT | Mod: 26,,, | Performed by: RADIOLOGY

## 2023-03-20 PROCEDURE — 99214 OFFICE O/P EST MOD 30 MIN: CPT | Mod: S$GLB,,, | Performed by: INTERNAL MEDICINE

## 2023-03-20 PROCEDURE — 99214 PR OFFICE/OUTPT VISIT, EST, LEVL IV, 30-39 MIN: ICD-10-PCS | Mod: S$GLB,,, | Performed by: INTERNAL MEDICINE

## 2023-03-20 PROCEDURE — 74019 XR ABDOMEN FLAT AND ERECT: ICD-10-PCS | Mod: 26,,, | Performed by: RADIOLOGY

## 2023-03-20 PROCEDURE — 74019 RADEX ABDOMEN 2 VIEWS: CPT | Mod: TC,PO

## 2023-03-20 PROCEDURE — 81003 URINALYSIS AUTO W/O SCOPE: CPT | Performed by: INTERNAL MEDICINE

## 2023-03-20 PROCEDURE — 99999 PR PBB SHADOW E&M-EST. PATIENT-LVL IV: CPT | Mod: PBBFAC,,, | Performed by: INTERNAL MEDICINE

## 2023-03-20 NOTE — LETTER
March 20, 2023    Rahul Farrar  [address] [facility] [address]             [facility]  Internal Medicine  [id number] [place] [address]  Phone: [phone]  Fax: [phone]   March 20, 2023     Patient: Rahul Farrar   YOB: 1985   Date of Visit: 3/20/2023       To Whom it May Concern:    Rahul Farrar was seen in my clinic on 3/20/2023. He may return to work on 03/22/2023 .    Please excuse him from any classes or work missed.    If you have any questions or concerns, please don't hesitate to call.    Sincerely,         Pushpa Boss, DO

## 2023-03-20 NOTE — PROGRESS NOTES
Subjective:       Patient ID: Rahul Farrar is a 37 y.o. male.    Chief Complaint: Abdominal Pain    Abdominal Pain  This is a new problem. The current episode started in the past 7 days. The onset quality is sudden. The problem occurs intermittently. The problem has been waxing and waning. The pain is located in the epigastric region and RUQ. The pain is moderate. The quality of the pain is aching. The abdominal pain does not radiate. Associated symptoms include anorexia, constipation, diarrhea, nausea and weight loss. Pertinent negatives include no arthralgias, dysuria, fever or vomiting. The pain is aggravated by eating. The pain is relieved by Nothing. He has tried acetaminophen for the symptoms. The treatment provided mild relief.     Abdominal x-ray unremarkable.   Abdominal US shows mild sludge.     Review of Systems   Constitutional:  Positive for appetite change, unexpected weight change and weight loss. Negative for fever.   Cardiovascular:  Negative for chest pain.   Gastrointestinal:  Positive for abdominal pain, anorexia, constipation, diarrhea and nausea. Negative for vomiting.   Genitourinary:  Negative for dysuria.   Musculoskeletal:  Negative for arthralgias.       Objective:      Physical Exam  Vitals reviewed.   Constitutional:       General: He is not in acute distress.     Appearance: He is well-developed. He is not ill-appearing.   Eyes:      General: No scleral icterus.  Cardiovascular:      Rate and Rhythm: Normal rate and regular rhythm.      Heart sounds: Normal heart sounds.   Pulmonary:      Effort: Pulmonary effort is normal. No respiratory distress.      Breath sounds: Normal breath sounds.   Abdominal:      General: Abdomen is flat. Bowel sounds are normal. There is no distension.      Palpations: Abdomen is soft.      Tenderness: There is abdominal tenderness (mild). There is no right CVA tenderness, left CVA tenderness or guarding.   Skin:     General: Skin is warm and dry.    Neurological:      Mental Status: He is alert and oriented to person, place, and time.   Psychiatric:         Behavior: Behavior normal.       Assessment:       Problem List Items Addressed This Visit    None  Visit Diagnoses       Upper abdominal pain    -  Primary    Relevant Orders    CBC Auto Differential    Comprehensive Metabolic Panel    Urinalysis              Plan:       Rahul was seen today for abdominal pain.    Diagnoses and all orders for this visit:    Upper abdominal pain  -     CBC Auto Differential; Future  -     Comprehensive Metabolic Panel; Future  -     Urinalysis    Labs today.

## 2023-03-21 ENCOUNTER — TELEPHONE (OUTPATIENT)
Dept: INTERNAL MEDICINE | Facility: CLINIC | Age: 38
End: 2023-03-21
Payer: COMMERCIAL

## 2023-03-21 DIAGNOSIS — E87.5 HYPERKALEMIA: Primary | ICD-10-CM

## 2023-03-21 LAB
BILIRUB UR QL STRIP: NEGATIVE
CLARITY UR REFRACT.AUTO: CLEAR
COLOR UR AUTO: YELLOW
GLUCOSE UR QL STRIP: NEGATIVE
HGB UR QL STRIP: NEGATIVE
KETONES UR QL STRIP: NEGATIVE
LEUKOCYTE ESTERASE UR QL STRIP: NEGATIVE
NITRITE UR QL STRIP: NEGATIVE
PH UR STRIP: 7 [PH] (ref 5–8)
PROT UR QL STRIP: ABNORMAL
SP GR UR STRIP: >1.03 (ref 1–1.03)
URN SPEC COLLECT METH UR: ABNORMAL

## 2023-03-21 NOTE — LETTER
March 22, 2023    Rahul Farrar  [address] [facility] [address]             [facility]  Internal Medicine  [address] [place] [address]  Phone: [phone]  Fax: [phone]   March 22, 2023     Patient: Rahul Farrar   YOB: 1985   Date of Visit: 3/21/2023       To Whom it May Concern:    Rahul Farrar was seen in my clinic on 03/20/2023 . He may return to work on 03/25/2023 .    Please excuse him from any classes or work missed.    If you have any questions or concerns, please don't hesitate to call.    Sincerely,         Pushpa Boss, DO

## 2023-03-21 NOTE — TELEPHONE ENCOUNTER
----- Message from Elvira Kelley sent at 3/21/2023  2:12 PM CDT -----  .Type:  Test Results    Who Called: .Rahul Farrar   Name of Test (Lab/Mammo/Etc):   Date of Test: 03/20/2023  Ordering Provider: Gera  Where the test was performed:  [facility]  Would the patient rather a call back or a response via [facility]? Call back  Best Call Back Number: .[phone]   Additional Information:

## 2023-03-21 NOTE — TELEPHONE ENCOUNTER
----- Message from Rojas Reyes sent at 3/21/2023  4:05 PM CDT -----  Contact: Self - [phone]  .Type:  Patient Returning Call    Who Called:agnes  Who Left Message for Patient:nurse  Does the patient know what this is regarding?:unknown   Would the patient rather a call back or a response via [facility]? Call back   Best Call Back Number:[phone]  Additional Information:

## 2023-03-21 NOTE — TELEPHONE ENCOUNTER
Gave patient Dr. Boss results>>>>I reviewed his labs. His liver and kidneys are normal. White blood cell count is normal without evidence of an infection. Potassium is slightly high but may be due to mild dehydration    Patient states he needs a letter stating he is cleared to return to work. Can you assist with this matter?

## 2023-03-22 ENCOUNTER — PATIENT MESSAGE (OUTPATIENT)
Dept: INTERNAL MEDICINE | Facility: CLINIC | Age: 38
End: 2023-03-22
Payer: COMMERCIAL

## 2023-03-22 NOTE — TELEPHONE ENCOUNTER
"Gave patient Dr. Boss results>>>>>Only if patient feels he can return to work. If not, I am okay with extending his time off.   No significant abnormal lab findings to explain his pain. This rules out a lot of intra-abdominal causes and points to it being a virus. The recommendation is "bowel rest". This means primarily eating simple foods with few ingredients.   "

## 2023-03-22 NOTE — TELEPHONE ENCOUNTER
"Only if patient feels he can return to work. If not, I am okay with extending his time off.   No significant abnormal lab findings to explain his pain. This rules out a lot of intra-abdominal causes and points to it being a virus. The recommendation is "bowel rest". This means primarily eating simple foods with few ingredients.   "

## 2023-03-24 ENCOUNTER — PATIENT MESSAGE (OUTPATIENT)
Dept: GASTROENTEROLOGY | Facility: CLINIC | Age: 38
End: 2023-03-24
Payer: COMMERCIAL

## 2023-03-24 ENCOUNTER — TELEPHONE (OUTPATIENT)
Dept: GASTROENTEROLOGY | Facility: CLINIC | Age: 38
End: 2023-03-24
Payer: COMMERCIAL

## 2023-03-24 NOTE — TELEPHONE ENCOUNTER
Called patient to reschedule appointment scheduled for today due to Julianna being out of office. No answer, left a voicemail for a call back to be rescheduled to 3/31.

## 2023-03-28 ENCOUNTER — TELEPHONE (OUTPATIENT)
Dept: INTERNAL MEDICINE | Facility: CLINIC | Age: 38
End: 2023-03-28
Payer: COMMERCIAL

## 2023-03-28 NOTE — TELEPHONE ENCOUNTER
His GI appt got rescheduled due to the provider calling out ill his appt is now schedule for 3/30/2023

## 2023-03-28 NOTE — TELEPHONE ENCOUNTER
----- Message from Marissa Andino sent at 3/28/2023 10:43 AM CDT -----  Contact: [phone]  Patient is in need of an dated back from Monday of last week to Today. He was out sick and had to see Dr. Boss on the 20th and gave him an excuse but it did not stay he could come to work with his full duty. Patient would like to speak with a nurse, please call him back at [phone]. Thanks

## 2023-03-28 NOTE — TELEPHONE ENCOUNTER
----- Message from Letha Washington sent at 3/28/2023  9:33 AM CDT -----  Contact: Rahul  Patient is calling in regards to clearance. Reports messaging nurse previously but hasn't received a response. Is needing a work clearance letter stating he can return back to full duty without limitations. Is needing that clearance before he's allowed to return back to work and has already missed days due to not having it. Can be placed in my chart, emailed or he can come  a physical copy. Please return call at .[phone].

## 2023-03-30 ENCOUNTER — OFFICE VISIT (OUTPATIENT)
Dept: PSYCHIATRY | Facility: CLINIC | Age: 38
End: 2023-03-30
Payer: COMMERCIAL

## 2023-03-30 ENCOUNTER — HOSPITAL ENCOUNTER (OUTPATIENT)
Dept: RADIOLOGY | Facility: HOSPITAL | Age: 38
Discharge: HOME | End: 2023-03-30
Attending: PHYSICIAN ASSISTANT
Payer: COMMERCIAL

## 2023-03-30 ENCOUNTER — OFFICE VISIT (OUTPATIENT)
Dept: GASTROENTEROLOGY | Facility: CLINIC | Age: 38
End: 2023-03-30
Payer: COMMERCIAL

## 2023-03-30 VITALS
HEIGHT: 70 IN | WEIGHT: 224 LBS | SYSTOLIC BLOOD PRESSURE: 142 MMHG | DIASTOLIC BLOOD PRESSURE: 90 MMHG | BODY MASS INDEX: 32.07 KG/M2 | HEART RATE: 85 BPM

## 2023-03-30 DIAGNOSIS — E87.5 HYPERKALEMIA: ICD-10-CM

## 2023-03-30 DIAGNOSIS — F41.0 GENERALIZED ANXIETY DISORDER WITH PANIC ATTACKS: Primary | ICD-10-CM

## 2023-03-30 DIAGNOSIS — R10.9 ABDOMINAL PAIN, UNSPECIFIED ABDOMINAL LOCATION: ICD-10-CM

## 2023-03-30 DIAGNOSIS — R11.0 NAUSEA: ICD-10-CM

## 2023-03-30 DIAGNOSIS — R17 ELEVATED BILIRUBIN: ICD-10-CM

## 2023-03-30 DIAGNOSIS — R19.7 DIARRHEA, UNSPECIFIED TYPE: ICD-10-CM

## 2023-03-30 DIAGNOSIS — R10.9 ABDOMINAL PAIN, UNSPECIFIED ABDOMINAL LOCATION: Primary | ICD-10-CM

## 2023-03-30 DIAGNOSIS — F41.1 GENERALIZED ANXIETY DISORDER WITH PANIC ATTACKS: Primary | ICD-10-CM

## 2023-03-30 PROCEDURE — 99203 PR OFFICE/OUTPT VISIT, NEW, LEVL III, 30-44 MIN: ICD-10-PCS | Mod: S$GLB,,, | Performed by: PHYSICIAN ASSISTANT

## 2023-03-30 PROCEDURE — 74177 CT ABDOMEN PELVIS WITH CONTRAST: ICD-10-PCS | Mod: 26,,, | Performed by: RADIOLOGY

## 2023-03-30 PROCEDURE — 99999 PR PBB SHADOW E&M-EST. PATIENT-LVL IV: ICD-10-PCS | Mod: PBBFAC,,, | Performed by: PHYSICIAN ASSISTANT

## 2023-03-30 PROCEDURE — 99214 OFFICE O/P EST MOD 30 MIN: CPT | Mod: 95,,, | Performed by: PSYCHIATRY & NEUROLOGY

## 2023-03-30 PROCEDURE — A9698 NON-RAD CONTRAST MATERIALNOC: HCPCS | Mod: PO | Performed by: PHYSICIAN ASSISTANT

## 2023-03-30 PROCEDURE — 74177 CT ABD & PELVIS W/CONTRAST: CPT | Mod: TC

## 2023-03-30 PROCEDURE — 99214 PR OFFICE/OUTPT VISIT, EST, LEVL IV, 30-39 MIN: ICD-10-PCS | Mod: 95,,, | Performed by: PSYCHIATRY & NEUROLOGY

## 2023-03-30 PROCEDURE — 99999 PR PBB SHADOW E&M-EST. PATIENT-LVL IV: CPT | Mod: PBBFAC,,, | Performed by: PHYSICIAN ASSISTANT

## 2023-03-30 PROCEDURE — 99203 OFFICE O/P NEW LOW 30 MIN: CPT | Mod: S$GLB,,, | Performed by: PHYSICIAN ASSISTANT

## 2023-03-30 PROCEDURE — 25500020 PHARM REV CODE 255: Performed by: PHYSICIAN ASSISTANT

## 2023-03-30 PROCEDURE — 90833 PSYTX W PT W E/M 30 MIN: CPT | Mod: 95,,, | Performed by: PSYCHIATRY & NEUROLOGY

## 2023-03-30 PROCEDURE — 74177 CT ABD & PELVIS W/CONTRAST: CPT | Mod: 26,,, | Performed by: RADIOLOGY

## 2023-03-30 PROCEDURE — 90833 PR PSYCHOTHERAPY W/PATIENT W/E&M, 30 MIN (ADD ON): ICD-10-PCS | Mod: 95,,, | Performed by: PSYCHIATRY & NEUROLOGY

## 2023-03-30 PROCEDURE — 25500020 PHARM REV CODE 255: Mod: PO | Performed by: PHYSICIAN ASSISTANT

## 2023-03-30 RX ORDER — ONDANSETRON 4 MG/1
TABLET, ORALLY DISINTEGRATING ORAL
COMMUNITY
Start: 2023-03-15

## 2023-03-30 RX ORDER — CLONAZEPAM 0.5 MG/1
0.5 TABLET ORAL 3 TIMES DAILY PRN
Qty: 90 TABLET | Refills: 3 | Status: SHIPPED | OUTPATIENT
Start: 2023-03-30 | End: 2023-07-06 | Stop reason: SDUPTHER

## 2023-03-30 RX ORDER — PANTOPRAZOLE SODIUM 40 MG/1
40 TABLET, DELAYED RELEASE ORAL DAILY
Qty: 30 TABLET | Refills: 11 | Status: SHIPPED | OUTPATIENT
Start: 2023-03-30 | End: 2024-03-29

## 2023-03-30 RX ADMIN — IOHEXOL 500 ML: 9 SOLUTION ORAL at 12:03

## 2023-03-30 RX ADMIN — IOHEXOL 100 ML: 350 INJECTION, SOLUTION INTRAVENOUS at 01:03

## 2023-03-30 NOTE — PROGRESS NOTES
"Clinic Consult:  [facility] Consultation Note    Reason for Consult:  The primary encounter diagnosis was Abdominal pain, unspecified abdominal location. Diagnoses of Diarrhea, unspecified type, Hyperkalemia, Elevated bilirubin, and Nausea were also pertinent to this visit.    PCP: Nain Long   No address on file    CC: Abdominal Pain      HPI:  This is a 37 y.o. male here for evaluation of the above. Began March 16 with nausea, chills, feverish. Took Tylenol with no improvement. Felt like the flu. Went to urgent care. COVID and flu negative. No vomiting or diarrhea at this time. RUQ was tender (States he has had RUQ off and on for years). Urgent care thought viral vs. Gallbladder. Nausea worsened - started on Zofran. Went to PCP who ordered US and Xray. Pain continued prior to imaging - also started with some vomiting and diarrhea. US - normal other than mild sludge in the GB. Xray - normal other than stool and gas. US was painful to RUQ.  Still following BRAT diet. If eats anything more, pain and nausea.   This past week, started feeling some improvement. Tried going back to work yesterday and felt abdominal pain, weakness and fatigue. "Something isn't right".  Pain was initially sharp, but now feels a little more generalized and crampy in nature.   Labs completed with elevated Potassium and Tbili. Bilirubin with chronic, mild elevation. AST and ALT normal.    Review of Systems   Constitutional:  Positive for activity change, appetite change, chills and fatigue. Negative for diaphoresis. Fever: felt feverish - now resolved.  HENT:  Negative for trouble swallowing.    Respiratory:  Negative for cough and shortness of breath.    Cardiovascular:  Negative for chest pain.   Gastrointestinal:  Positive for abdominal pain, diarrhea and nausea. Negative for abdominal distention, anal bleeding, blood in stool, constipation and vomiting.   Genitourinary:  Negative for dysuria and hematuria.   Skin:  " Negative for color change and pallor.   Neurological:  Positive for weakness. Negative for dizziness and light-headedness.   Psychiatric/Behavioral:  Negative for dysphoric mood. The patient is nervous/anxious.       Medical History:   No past medical history on file.    Surgical History:   Past Surgical History:   Procedure Laterality Date    FOREARM FRACTURE SURGERY      NOSE SURGERY      SINUS SURGERY      TYMPANOSTOMY TUBE PLACEMENT         Family History:    Family History   Problem Relation Age of Onset    Diabetes Maternal Grandfather     Heart disease Maternal Grandfather        Social History:   Social History     Socioeconomic History    Marital status:     Number of children: 1   Occupational History    Occupation: operation   Tobacco Use    Smoking status: Never    Smokeless tobacco: Former     Types: Chew   Substance and Sexual Activity    Alcohol use: Yes    Drug use: No    Sexual activity: Yes     Partners: Female       Allergies:   Review of patient's allergies indicates:  No Known Allergies    Home Medications:   Current Outpatient Medications on File Prior to Visit   Medication Sig Dispense Refill    clonazePAM (KLONOPIN) 0.5 MG tablet Take 1 tablet (0.5 mg total) by mouth 3 (three) times daily as needed for Anxiety. 90 tablet 1    ondansetron (ZOFRAN-ODT) 4 MG TbDL DISSOLVE 1 TABLET ON THE TONGUE THREE TIMES DAILY FOR 3 DAYS AS NEEDED FOR NAUSEA      diazePAM (VALIUM) 5 MG tablet Take 1 tablet (5 mg total) by mouth once. for 1 dose (Patient not taking: Reported on 3/30/2023) 1 tablet 0    EScitalopram oxalate (LEXAPRO) 10 MG tablet Take 1/2 tablet daily for 7 days then 1 daily. 30 tablet 2    multivitamin (THERAGRAN) per tablet Take 1 tablet by mouth.      oxyCODONE-acetaminophen (PERCOCET) 5-325 mg per tablet Take 1 tablet by mouth every 4 (four) hours as needed for Pain. (Patient not taking: Reported on 3/30/2023) 25 tablet 0    sulfamethoxazole-trimethoprim 800-160mg (BACTRIM DS) 800-160  "mg Tab Take 1 tablet by mouth 2 (two) times daily. (Patient not taking: Reported on 3/30/2023) 10 tablet 0    [DISCONTINUED] needle, disp, 21 G 21 gauge x 1 1/2" Ndle Use with syringe to self administer Depotestosterone 3 each 4    [DISCONTINUED] safety needles (BD SAFETYGLIDE NEEDLE) 18 gauge x 1 1/2" Ndle Use to self administer depotestosterone 3 each 4    [DISCONTINUED] syringe, disposable, (BD LUER-MATRIN SYRINGE) 3 mL Syrg Use syringes along with needles to self administer depotestosterone 3 each 4     No current facility-administered medications on file prior to visit.       Physical Exam:  BP (!) 142/90   Pulse 85   Ht 5' 10" (1.778 m)   Wt 101.6 kg (223 lb 15.8 oz)   BMI 32.14 kg/m²   Body mass index is 32.14 kg/m².  Physical Exam  Constitutional:       General: He is not in acute distress.     Appearance: Normal appearance. He is not ill-appearing, toxic-appearing or diaphoretic.   HENT:      Head: Normocephalic and atraumatic.   Eyes:      General: No scleral icterus.     Extraocular Movements: Extraocular movements intact.   Cardiovascular:      Rate and Rhythm: Normal rate and regular rhythm.   Pulmonary:      Effort: Pulmonary effort is normal. No respiratory distress.      Breath sounds: Normal breath sounds.   Abdominal:      General: Bowel sounds are normal. There is no distension.      Palpations: Abdomen is soft. There is no mass.      Tenderness: There is abdominal tenderness. There is no guarding.      Hernia: No hernia is present.   Musculoskeletal:         General: Normal range of motion.      Cervical back: Normal range of motion.   Skin:     General: Skin is warm and dry.      Coloration: Skin is not jaundiced or pale.   Neurological:      Mental Status: He is alert and oriented to person, place, and time.         Labs: Pertinent labs reviewed.  Endoscopy: --  CRC Screening: --  Anticoagulation: --    Assessment:  1. Abdominal pain, unspecified abdominal location    2. Diarrhea, unspecified " type    3. Hyperkalemia    4. Elevated bilirubin    5. Nausea         Recommendations:  -CT scan of abdomen due to ongoing symptoms. Explained this could possibly be viral, but would like to rule out colitis or inflammatory response given his ongoing symptoms.   -continue bland diet.   -start on daily Protonix given nausea and upper abdominal discomfort.   -Recheck CMP due to previous hyperkalemia.   -If CT normal, consider HIDA. US mentions mild sludge with no other abnormalities, however, patient reports intermittent RUQ pain for years, but seems like this was a severe flare.     Abdominal pain, unspecified abdominal location  -     Comprehensive Metabolic Panel; Future; Expected date: 03/30/2023  -     pantoprazole (PROTONIX) 40 MG tablet; Take 1 tablet (40 mg total) by mouth once daily.  Dispense: 30 tablet; Refill: 11  -     CT Abdomen Pelvis With Contrast; Future; Expected date: 03/30/2023    Diarrhea, unspecified type  -     Comprehensive Metabolic Panel; Future; Expected date: 03/30/2023  -     CT Abdomen Pelvis With Contrast; Future; Expected date: 03/30/2023    Hyperkalemia  -     Comprehensive Metabolic Panel; Future; Expected date: 03/30/2023  -     CT Abdomen Pelvis With Contrast; Future; Expected date: 03/30/2023    Elevated bilirubin  -     Comprehensive Metabolic Panel; Future; Expected date: 03/30/2023  -     CT Abdomen Pelvis With Contrast; Future; Expected date: 03/30/2023    Nausea  -     Comprehensive Metabolic Panel; Future; Expected date: 03/30/2023  -     pantoprazole (PROTONIX) 40 MG tablet; Take 1 tablet (40 mg total) by mouth once daily.  Dispense: 30 tablet; Refill: 11  -     CT Abdomen Pelvis With Contrast; Future; Expected date: 03/30/2023        No follow-ups on file.    Thank you so much for allowing me to participate in the care of Rahul Blankenship PA-C

## 2023-03-30 NOTE — LETTER
March 30, 2023    Rahul Farrar  [address] [facility] [address]             [facility]  Gastroenterology  [id number] [place] [id number]  Phone: [phone]  Fax: [phone]   March 30, 2023     Patient: Rahul Farrar   YOB: 1985   Date of Visit: 3/30/2023       To Whom it May Concern:    Rahul Farrar was seen in my clinic on 3/30/2023. He may return to work on Monday 04/03/2023, full duty.    Please excuse him from any work missed.    If you have any questions or concerns, please don't hesitate to call.    Sincerely,         Julianna Blankenship PA-C/am

## 2023-03-30 NOTE — PROGRESS NOTES
"Outpatient Psychiatry Follow-Up Visit (MD/NP)    3/30/2023      Clinical Status of Patient:  Outpatient (Ambulatory)    Chief Complaint:  Rahul Farrar is a 37 y.o. male who presents today for follow-up of depression and anxiety.  Met with patient.      Interval History and Content of Current Session:  Interim Events/Subjective Report/Content of Current Session: Patient seen and interviewed for follow-up, last seen about six months previously. Ongoing anxiety. WENDIE-7 = 11 (9 at last visit). CT - constitutional symptoms; past week. Out all this week. Abdominal tenderness. Question of gall-bladder illness. Saw GI. Getting a CT. Now doing q3m testosterone. Better energy. New baby born in December. Healthy and wife's mental health is better than at last visit. Work is overall going well. New therapy relationship is good. Doing couples therapy. Adherent with clonazepam. Getting good relief.     Background: Pt is a 35 y/o man who presents for psychiatric follow-up, previously a pt of Johanna Boucher, no longer at [facility]. Started seeing Dr. MARADIAGA in August of '20, last saw her in April of this year. Saw Sharda Harmon in therapy from Feb. to May of this year.     Complains of problematic anxiety including intense intermittent anxiety attacks, precipitated by overworry, catastrophizing & thinking of "worst case scenarios". Intense worry focused on his infant choking. Also has intense fear of flying. Realizes anxiety is disproportionate to dangers.   Xanax has been helpful. Trying to get off over time. Sees a therapist for couples counseling. Anxiety about flying.   Buspirone didn't help  Hydroxyzine  Duloxetine    Remote: seroquel, escitalopram, ambien; following dad's suicide. Gained weight. Stopped meds, did ok.     Last visit: Struggling with anxiety. He is worried about his daughter choking and is unable to let go of the thought. White coat syndrome: bp goes up    He is really resistant about having medications.     No " "suicidal ideation   No homicidal ideation      GAD7 3/30/2023 2022 2022   1. Feeling nervous, anxious, or on edge? 1 1 1   2. Not being able to stop or control worrying? 2 1 1   3. Worrying too much about different things? 1 1 1   4. Trouble relaxing? 1 2 1   5. Being so restless that it is hard to sit still? 2 0 1   6. Becoming easily annoyed or irritable? 2 2 1   7. Feeling afraid as if something awful might happen? 2 2 2   WENDIE-7 Score 11 9 8     33 yo M who was referred by Dr. Long, anxiety. Currently Xanax .5 mg twice a day. He states that he doesn't take one. She had prescribed it originally. He has tried different medications: buspirone treid it for 3 weeks then prescribed an anti-depressant: They tried different types of medications, the psychiatrist tried lexapro, seroquel, ambien,      had a baby, found out wife was pregnant October daughter born, new job, & had a lot of anxiety, hard time to get things done. Had a lot on his plate, that was stressful, and he had anxiety about being a dad. Dad has depression     Step dad committed suicide when Rahul was 21 yo, he had a DWI & mom & him were mad at him, Dad texted him. They switched medicines a lot, he was prescribed seroquel & ambien at the same time. Was on lexapro. A lot of problems with sleep, he nightmares, sleep depressed. Mom "checked out". He went to a grief counselor. He was on too much medication: later tapered off of the medications by another doctor who also provided counseling, & he felt that he developed the coping skills to deal with anxiety & grief     His symptoms of anxiety, with anxiety attacks were reactivated when he became anxious about being a father. The anxiety worsened when his wife had an emergency  & their daughter had complications of jaundice.     Reviewed the following: GAD7 - 5.3.21 (9), 4.19.21 (11) (see notes for specific items):   After the panic attacks, he was doing a lot of things, but his " energy levels were down due to feeling overwhelmed.   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    Referral to therapist for treatment of anxiety   Will begin to taper off of xanax once patient is established in therapy    Strengths and Liabilities: Strength: Patient accepts guidance/feedback, Strength: Patient is expressive/articulate., Strength: Patient is motivated for change., Liability: Patient lacks coping skills.    Treatment Goals:  Specify outcomes written in observable, behavioral terms:   Anxiety: reducing negative automatic thoughts, reducing physical symptoms of anxiety and reducing time spent worrying (<30 minutes/day)    Treatment Plan/Recommendations:   Medication Management: Continue current medications. The risks and benefits of medication were discussed with the patient.    Review of Systems   PSYCHIATRIC: Pertinant items are noted in the narrative.    Past Medical, Family and Social History: The patient's past medical, family and social history have been reviewed and updated as appropriate within the electronic medical record - see encounter notes.    Compliance: yes    Side effects: None    Risk Parameters:  Patient reports no suicidal ideation  Patient reports no homicidal  ideation  Patient reports no self-injurious behavior  Patient reports no violent behavior    Exam (detailed: at least 9 elements; comprehensive: all 15 elements)   Constitutional  Vitals:  Most recent vital signs, dated greater than 90 days prior to this appointment, were reviewed.   Last 3 sets of Vitals    Vitals - 1 value per visit 3/20/2023 3/30/2023 3/30/2023   SYSTOLIC 126 - 142   DIASTOLIC 80 - 90   Pulse 80 - 85   Temp 96.9 - -   Resp 20 - -   SPO2 99 - -   Weight (lb) 229.28 - 223.99   Weight (kg) 104 - 101.6   Height 70 - 70   BMI (Calculated) 32.9 - 32.1   VISIT REPORT - - -   Pain Score  - 0 -   Some recent data might be hidden          General:  unremarkable, age appropriate     Musculoskeletal  Muscle Strength/Tone:  not examined   Gait & Station:  non-ataxic     Psychiatric  Speech:  no latency; no press   Mood & Affect:  anxious  congruent and appropriate   Thought Process:  normal and logical   Associations:  intact   Thought Content:  suicidal thoughts: (active-no, passive-no), homicidal thoughts: (active-no, passive-no)   Insight:  has awareness of illness   Judgement: behavior is adequate to circumstances   Orientation:  grossly intact   Memory: intact for content of interview   Language: grossly intact   Attention Span & Concentration:  able to focus   Fund of Knowledge:  intact and appropriate to age and level of education     Assessment and Diagnosis   Status/Progress: Based on the examination today, the patient's problem(s) is/are adequately but not ideally controlled.  New problems have not been presented today.    social stressors  are complicating management of the primary condition.  There are no active rule-out diagnoses for this patient at this time.     General Impression:     Dx: WENDIE    Intervention/Counseling/Treatment Plan   Medication Management: The risks and benefits of medication were discussed with the patient. Has tolerated monoamines well.     Clonazepam.    Psychotherapy  today. psychoeducation, motivational interviewing.     Return to Clinic: 3 months    KIRTI Rhodes MD

## 2023-03-31 ENCOUNTER — PATIENT MESSAGE (OUTPATIENT)
Dept: GASTROENTEROLOGY | Facility: CLINIC | Age: 38
End: 2023-03-31
Payer: COMMERCIAL

## 2023-03-31 DIAGNOSIS — R10.11 RUQ PAIN: Primary | ICD-10-CM

## 2023-03-31 DIAGNOSIS — R11.0 NAUSEA: ICD-10-CM

## 2023-03-31 DIAGNOSIS — R19.7 DIARRHEA, UNSPECIFIED TYPE: ICD-10-CM

## 2023-03-31 DIAGNOSIS — E87.5 HYPERKALEMIA: Primary | ICD-10-CM

## 2023-03-31 DIAGNOSIS — R17 ELEVATED BILIRUBIN: ICD-10-CM

## 2023-04-13 ENCOUNTER — HOSPITAL ENCOUNTER (OUTPATIENT)
Dept: RADIOLOGY | Facility: HOSPITAL | Age: 38
Discharge: HOME | End: 2023-04-13
Attending: PHYSICIAN ASSISTANT
Payer: COMMERCIAL

## 2023-04-13 ENCOUNTER — PATIENT MESSAGE (OUTPATIENT)
Dept: GASTROENTEROLOGY | Facility: CLINIC | Age: 38
End: 2023-04-13
Payer: COMMERCIAL

## 2023-04-13 DIAGNOSIS — R10.11 RUQ PAIN: ICD-10-CM

## 2023-04-13 DIAGNOSIS — R11.0 NAUSEA: ICD-10-CM

## 2023-04-13 DIAGNOSIS — R19.7 DIARRHEA, UNSPECIFIED TYPE: ICD-10-CM

## 2023-04-13 PROCEDURE — 78227 HEPATOBIL SYST IMAGE W/DRUG: CPT | Mod: TC

## 2023-04-13 PROCEDURE — 78227 HEPATOBIL SYST IMAGE W/DRUG: CPT | Mod: 26,,, | Performed by: RADIOLOGY

## 2023-04-13 PROCEDURE — 78227 NM HEPATOBILIARY(HIDA) WITH PHARM AND EF WHEN PERFORMED: ICD-10-PCS | Mod: 26,,, | Performed by: RADIOLOGY

## 2023-04-13 PROCEDURE — 63600175 PHARM REV CODE 636 W HCPCS

## 2023-04-13 RX ORDER — SINCALIDE 5 UG/5ML
0.02 INJECTION, POWDER, LYOPHILIZED, FOR SOLUTION INTRAVENOUS ONCE
Status: COMPLETED | OUTPATIENT
Start: 2023-04-13 | End: 2023-04-13

## 2023-04-13 RX ADMIN — SINCALIDE: 5 INJECTION, POWDER, LYOPHILIZED, FOR SOLUTION INTRAVENOUS at 01:04

## 2023-04-17 ENCOUNTER — LAB VISIT (OUTPATIENT)
Dept: LAB | Facility: HOSPITAL | Age: 38
End: 2023-04-17
Attending: UROLOGY
Payer: COMMERCIAL

## 2023-04-17 DIAGNOSIS — E87.5 HYPERKALEMIA: ICD-10-CM

## 2023-04-17 DIAGNOSIS — E29.1 TESTICULAR HYPOGONADISM: ICD-10-CM

## 2023-04-17 DIAGNOSIS — R17 ELEVATED BILIRUBIN: ICD-10-CM

## 2023-04-17 PROCEDURE — 84403 ASSAY OF TOTAL TESTOSTERONE: CPT | Performed by: UROLOGY

## 2023-04-17 PROCEDURE — 80053 COMPREHEN METABOLIC PANEL: CPT | Performed by: PHYSICIAN ASSISTANT

## 2023-04-17 PROCEDURE — 85025 COMPLETE CBC W/AUTO DIFF WBC: CPT | Performed by: UROLOGY

## 2023-04-17 PROCEDURE — 80076 HEPATIC FUNCTION PANEL: CPT | Performed by: UROLOGY

## 2023-04-17 PROCEDURE — 36415 COLL VENOUS BLD VENIPUNCTURE: CPT | Performed by: UROLOGY

## 2023-04-18 LAB
ALBUMIN SERPL BCP-MCNC: 4.5 G/DL (ref 3.5–5.2)
ALBUMIN SERPL BCP-MCNC: 4.5 G/DL (ref 3.5–5.2)
ALP SERPL-CCNC: 54 U/L (ref 55–135)
ALP SERPL-CCNC: 54 U/L (ref 55–135)
ALT SERPL W/O P-5'-P-CCNC: 39 U/L (ref 10–44)
ALT SERPL W/O P-5'-P-CCNC: 39 U/L (ref 10–44)
ANION GAP SERPL CALC-SCNC: 8 MMOL/L (ref 8–16)
AST SERPL-CCNC: 22 U/L (ref 10–40)
AST SERPL-CCNC: 22 U/L (ref 10–40)
BASOPHILS # BLD AUTO: 0.02 K/UL (ref 0–0.2)
BASOPHILS NFR BLD: 0.4 % (ref 0–1.9)
BILIRUB DIRECT SERPL-MCNC: 0.4 MG/DL (ref 0.1–0.3)
BILIRUB SERPL-MCNC: 1.1 MG/DL (ref 0.1–1)
BILIRUB SERPL-MCNC: 1.1 MG/DL (ref 0.1–1)
BUN SERPL-MCNC: 14 MG/DL (ref 6–20)
CALCIUM SERPL-MCNC: 9.7 MG/DL (ref 8.7–10.5)
CHLORIDE SERPL-SCNC: 105 MMOL/L (ref 95–110)
CO2 SERPL-SCNC: 26 MMOL/L (ref 23–29)
CREAT SERPL-MCNC: 1 MG/DL (ref 0.5–1.4)
DIFFERENTIAL METHOD: ABNORMAL
EOSINOPHIL # BLD AUTO: 0 K/UL (ref 0–0.5)
EOSINOPHIL NFR BLD: 0.2 % (ref 0–8)
ERYTHROCYTE [DISTWIDTH] IN BLOOD BY AUTOMATED COUNT: 11.3 % (ref 11.5–14.5)
EST. GFR  (NO RACE VARIABLE): >60 ML/MIN/1.73 M^2
GLUCOSE SERPL-MCNC: 91 MG/DL (ref 70–110)
HCT VFR BLD AUTO: 47.9 % (ref 40–54)
HGB BLD-MCNC: 16.4 G/DL (ref 14–18)
IMM GRANULOCYTES # BLD AUTO: 0.01 K/UL (ref 0–0.04)
IMM GRANULOCYTES NFR BLD AUTO: 0.2 % (ref 0–0.5)
LYMPHOCYTES # BLD AUTO: 1.8 K/UL (ref 1–4.8)
LYMPHOCYTES NFR BLD: 40.6 % (ref 18–48)
MCH RBC QN AUTO: 29.7 PG (ref 27–31)
MCHC RBC AUTO-ENTMCNC: 34.2 G/DL (ref 32–36)
MCV RBC AUTO: 87 FL (ref 82–98)
MONOCYTES # BLD AUTO: 0.3 K/UL (ref 0.3–1)
MONOCYTES NFR BLD: 7.2 % (ref 4–15)
NEUTROPHILS # BLD AUTO: 2.3 K/UL (ref 1.8–7.7)
NEUTROPHILS NFR BLD: 51.4 % (ref 38–73)
NRBC BLD-RTO: 0 /100 WBC
PLATELET # BLD AUTO: 217 K/UL (ref 150–450)
PMV BLD AUTO: 12.1 FL (ref 9.2–12.9)
POTASSIUM SERPL-SCNC: 4.8 MMOL/L (ref 3.5–5.1)
PROT SERPL-MCNC: 6.9 G/DL (ref 6–8.4)
PROT SERPL-MCNC: 6.9 G/DL (ref 6–8.4)
RBC # BLD AUTO: 5.52 M/UL (ref 4.6–6.2)
SODIUM SERPL-SCNC: 139 MMOL/L (ref 136–145)
TESTOST SERPL-MCNC: 400 NG/DL (ref 304–1227)
WBC # BLD AUTO: 4.46 K/UL (ref 3.9–12.7)

## 2023-04-19 ENCOUNTER — TELEPHONE (OUTPATIENT)
Dept: UROLOGY | Facility: CLINIC | Age: 38
End: 2023-04-19
Payer: COMMERCIAL

## 2023-04-19 NOTE — TELEPHONE ENCOUNTER
Message left for pt to return call     ----- Message from Amena Diaz sent at 4/19/2023  3:43 PM CDT -----  Contact: Rahul Kay is returning a missed call. Please call him at [phone]

## 2023-04-20 ENCOUNTER — TELEPHONE (OUTPATIENT)
Dept: UROLOGY | Facility: CLINIC | Age: 38
End: 2023-04-20
Payer: COMMERCIAL

## 2023-04-20 ENCOUNTER — PATIENT MESSAGE (OUTPATIENT)
Dept: UROLOGY | Facility: CLINIC | Age: 38
End: 2023-04-20
Payer: COMMERCIAL

## 2023-04-20 NOTE — TELEPHONE ENCOUNTER
Returned call to pt; requested later appt for his testopel; none avail; pt stated he would keep existing appt.

## 2023-04-26 ENCOUNTER — PROCEDURE VISIT (OUTPATIENT)
Dept: UROLOGY | Facility: CLINIC | Age: 38
End: 2023-04-26
Payer: COMMERCIAL

## 2023-04-26 VITALS
HEART RATE: 74 BPM | BODY MASS INDEX: 32 KG/M2 | DIASTOLIC BLOOD PRESSURE: 74 MMHG | WEIGHT: 223 LBS | SYSTOLIC BLOOD PRESSURE: 119 MMHG

## 2023-04-26 DIAGNOSIS — N45.3 EPIDIDYMO-ORCHITIS: ICD-10-CM

## 2023-04-26 DIAGNOSIS — E29.1 TESTICULAR HYPOGONADISM: Primary | ICD-10-CM

## 2023-04-26 PROCEDURE — 11980 PR IMPLANT,HORMONE,SUBCUTANEOUS: ICD-10-PCS | Mod: S$GLB,,, | Performed by: UROLOGY

## 2023-04-26 PROCEDURE — S0189 TESTOSTERONE PELLET 75 MG: HCPCS | Mod: S$GLB,,, | Performed by: UROLOGY

## 2023-04-26 PROCEDURE — 11980 IMPLANT HORMONE PELLET(S): CPT | Mod: S$GLB,,, | Performed by: UROLOGY

## 2023-04-26 PROCEDURE — S0189 PR TESTOSTERONE PELLET 75 MG: ICD-10-PCS | Mod: S$GLB,,, | Performed by: UROLOGY

## 2023-04-26 RX ORDER — LEVOFLOXACIN 500 MG/1
500 TABLET, FILM COATED ORAL DAILY
Qty: 3 TABLET | Refills: 0 | Status: SHIPPED | OUTPATIENT
Start: 2023-04-26 | End: 2023-04-29

## 2023-04-26 NOTE — PROCEDURES
Procedures  Chief Complaint:   Encounter Diagnoses   Name Primary?    Testicular hypogonadism Yes    Epididymo-orchitis        HPI:   4/26/23- here for his testopel insertion.    7/20/22- LR- Patient is a 36-year-old male that is presenting with concerns of hypogonadism.  Patient was seen several years ago and total T was at the low-side of normal. Patient states that he and his wife are no longer wanting any more children and he would like to discuss possible testosterone replacement therapy.  No labs in the last 16 months.  Patient states that he has had fatigue and energy level has greatly decreased.Normal sexual function.       Allergies:  Patient has no known allergies.    Medications:  has a current medication list which includes the following prescription(s): clonazepam, multivitamin, needle (disp) 21 g, safety needles, syringe (disposable), testosterone cypionate, escitalopram oxalate, and meloxicam.    Review of Systems:  General: No fever, chills, fatigability, or weight loss.  Skin: No rashes, itching, or changes in color or texture of skin.  Chest: Denies LAKHANI, cyanosis, wheezing, cough, and sputum production.  Abdomen: Appetite fine. No weight loss. Denies diarrhea, abdominal pain, hematemesis, or blood in stool.  Musculoskeletal: No joint stiffness or swelling. Denies back pain.  : As above.  All other review of systems negative.    PMH:  Hypogonadism in males    PSH:   has a past surgical history that includes Nose surgery; Forearm fracture surgery; Tympanostomy tube placement; and Sinus surgery.    FamHx: family history includes Diabetes in his maternal grandfather; Heart disease in his maternal grandfather.    SocHx:  reports that he has never smoked. His smokeless tobacco use includes chew. He reports current alcohol use. He reports that he does not use drugs.      Physical Exam:  Vitals:    01/18/23 0942   BP: 134/85   Pulse: 87     General: A&Ox3, no apparent distress, no deformities  Neck: No  masses, normal ROM  Lungs: normal inspiration, no use of accessory muscles  Heart: normal pulse, no arrhythmias  Abdomen: Soft, NT, ND, no masses, no hernias, no hepatosplenomegaly  Skin: The skin is warm and dry. No jaundice.  Ext: No c/c/e.    Labs/Studies:   Testopel  4/26/23, 1/18/23  Testosterone 297 7/22    Patient was sterilely prepped and draped, then positioned in the right side up, lateral decubitus position.  Lidocaine was used for local anesthesia.  Eleven blade was used to incise the skin, included trocars with the testopel kit were then used.  They were inserted within the incision site and we placed the pellets in a V location.  10 pellets total were inserted without difficulty.  Trocars were removed and pressure was applied for 2 min.  Steri-Strips applied for local coverage, he will return for lab assessment in 3 months.      Impression/Plan:     1. Hypogonadism- patient tolerated insertion of testopel well, call with any complaints.  Otherwise see me in 3 months for labs and reinsertion.      2. Epididymo-orchitis- call with any evidence of recurrence.

## 2023-05-24 ENCOUNTER — PATIENT MESSAGE (OUTPATIENT)
Dept: UROLOGY | Facility: CLINIC | Age: 38
End: 2023-05-24
Payer: COMMERCIAL

## 2023-05-25 RX ORDER — DIAZEPAM 5 MG/1
5 TABLET ORAL ONCE
Qty: 1 TABLET | Refills: 0 | Status: SHIPPED | OUTPATIENT
Start: 2023-05-25 | End: 2023-09-07

## 2023-05-29 ENCOUNTER — PROCEDURE VISIT (OUTPATIENT)
Dept: UROLOGY | Facility: CLINIC | Age: 38
End: 2023-05-29
Payer: COMMERCIAL

## 2023-05-29 DIAGNOSIS — N45.3 EPIDIDYMO-ORCHITIS: ICD-10-CM

## 2023-05-29 DIAGNOSIS — E29.1 TESTICULAR HYPOGONADISM: ICD-10-CM

## 2023-05-29 DIAGNOSIS — Z30.09 VASECTOMY EVALUATION: Primary | ICD-10-CM

## 2023-05-29 PROCEDURE — 88302 TISSUE EXAM BY PATHOLOGIST: CPT | Mod: 26,,, | Performed by: STUDENT IN AN ORGANIZED HEALTH CARE EDUCATION/TRAINING PROGRAM

## 2023-05-29 PROCEDURE — 55250 REMOVAL OF SPERM DUCT(S): CPT | Mod: S$GLB,,, | Performed by: UROLOGY

## 2023-05-29 PROCEDURE — 55250 PR REMOVAL OF SPERM DUCT(S): ICD-10-PCS | Mod: S$GLB,,, | Performed by: UROLOGY

## 2023-05-29 PROCEDURE — 88302 TISSUE EXAM BY PATHOLOGIST: CPT | Mod: 59 | Performed by: STUDENT IN AN ORGANIZED HEALTH CARE EDUCATION/TRAINING PROGRAM

## 2023-05-29 PROCEDURE — 88302 PR  SURG PATH,LEVEL II: ICD-10-PCS | Mod: 26,,, | Performed by: STUDENT IN AN ORGANIZED HEALTH CARE EDUCATION/TRAINING PROGRAM

## 2023-06-02 ENCOUNTER — PATIENT MESSAGE (OUTPATIENT)
Dept: UROLOGY | Facility: CLINIC | Age: 38
End: 2023-06-02
Payer: COMMERCIAL

## 2023-06-02 LAB
FINAL PATHOLOGIC DIAGNOSIS: NORMAL
Lab: NORMAL

## 2023-06-09 ENCOUNTER — LAB VISIT (OUTPATIENT)
Dept: LAB | Facility: HOSPITAL | Age: 38
End: 2023-06-09
Attending: UROLOGY
Payer: COMMERCIAL

## 2023-06-09 DIAGNOSIS — E29.1 TESTICULAR HYPOGONADISM: ICD-10-CM

## 2023-06-09 PROCEDURE — 82672 ASSAY OF ESTROGEN: CPT | Performed by: UROLOGY

## 2023-06-09 PROCEDURE — 36415 COLL VENOUS BLD VENIPUNCTURE: CPT | Performed by: UROLOGY

## 2023-06-12 LAB — ESTROGEN SERPL-MCNC: 118 PG/ML

## 2023-06-16 ENCOUNTER — PATIENT MESSAGE (OUTPATIENT)
Dept: UROLOGY | Facility: CLINIC | Age: 38
End: 2023-06-16
Payer: COMMERCIAL

## 2023-06-18 RX ORDER — ANASTROZOLE 1 MG/1
1 TABLET ORAL
Qty: 15 TABLET | Refills: 5 | Status: SHIPPED | OUTPATIENT
Start: 2023-06-19

## 2023-07-06 RX ORDER — CLONAZEPAM 0.5 MG/1
0.5 TABLET ORAL 3 TIMES DAILY PRN
Qty: 90 TABLET | Refills: 0 | Status: SHIPPED | OUTPATIENT
Start: 2023-07-24 | End: 2023-07-11 | Stop reason: SDUPTHER

## 2023-07-07 ENCOUNTER — TELEPHONE (OUTPATIENT)
Dept: PSYCHIATRY | Facility: CLINIC | Age: 38
End: 2023-07-07
Payer: COMMERCIAL

## 2023-07-11 ENCOUNTER — PATIENT MESSAGE (OUTPATIENT)
Dept: UROLOGY | Facility: CLINIC | Age: 38
End: 2023-07-11
Payer: COMMERCIAL

## 2023-07-11 NOTE — TELEPHONE ENCOUNTER
Spoke with pt eleanor brandi his appt with Dr DILL to 9/7.  Last seen on 3/30.  Submitting one month of refill to Dr Denney until Dr DILL's return.

## 2023-07-12 RX ORDER — CLONAZEPAM 0.5 MG/1
0.5 TABLET ORAL 3 TIMES DAILY PRN
Qty: 90 TABLET | Refills: 1 | Status: SHIPPED | OUTPATIENT
Start: 2023-07-29 | End: 2023-09-07 | Stop reason: SDUPTHER

## 2023-07-14 ENCOUNTER — PATIENT MESSAGE (OUTPATIENT)
Dept: UROLOGY | Facility: CLINIC | Age: 38
End: 2023-07-14
Payer: COMMERCIAL

## 2023-07-14 RX ORDER — LEVOFLOXACIN 500 MG/1
500 TABLET, FILM COATED ORAL DAILY
Qty: 14 TABLET | Refills: 0 | Status: SHIPPED | OUTPATIENT
Start: 2023-07-14 | End: 2023-07-26

## 2023-07-14 NOTE — PROGRESS NOTES
Patient examined at the bedside, discomfort and tenderness at vasectomy site, no skin lesions, testicle unremarkable.  Levofloxacin 14 days has already been called in, he will use ibuprofen p.r.n. but not going over 5 days, this is for discomfort and swelling.  In addition ice packs to site.  See me back as previously scheduled.  Call with any complaints in the meantime.

## 2023-07-26 ENCOUNTER — PROCEDURE VISIT (OUTPATIENT)
Dept: UROLOGY | Facility: CLINIC | Age: 38
End: 2023-07-26
Payer: COMMERCIAL

## 2023-07-26 VITALS — WEIGHT: 223 LBS | HEIGHT: 70 IN | BODY MASS INDEX: 31.92 KG/M2

## 2023-07-26 DIAGNOSIS — E29.1 TESTICULAR HYPOGONADISM: Primary | ICD-10-CM

## 2023-07-26 DIAGNOSIS — N45.3 EPIDIDYMO-ORCHITIS: ICD-10-CM

## 2023-07-26 PROCEDURE — S0189 PR TESTOSTERONE PELLET 75 MG: ICD-10-PCS | Mod: S$GLB,,, | Performed by: UROLOGY

## 2023-07-26 PROCEDURE — 11980 PR IMPLANT,HORMONE,SUBCUTANEOUS: ICD-10-PCS | Mod: 79,S$GLB,, | Performed by: UROLOGY

## 2023-07-26 PROCEDURE — 11980 IMPLANT HORMONE PELLET(S): CPT | Mod: 79,S$GLB,, | Performed by: UROLOGY

## 2023-07-26 PROCEDURE — S0189 TESTOSTERONE PELLET 75 MG: HCPCS | Mod: S$GLB,,, | Performed by: UROLOGY

## 2023-07-26 RX ORDER — LEVOFLOXACIN 500 MG/1
500 TABLET, FILM COATED ORAL DAILY
Qty: 3 TABLET | Refills: 0 | Status: SHIPPED | OUTPATIENT
Start: 2023-07-26 | End: 2023-07-29

## 2023-07-26 NOTE — PROCEDURES
Procedures  Chief Complaint:   Encounter Diagnoses   Name Primary?    Testicular hypogonadism Yes    Epididymo-orchitis        HPI:   7/26/23- here for his testopel insertion.    7/20/22- LR- Patient is a 36-year-old male that is presenting with concerns of hypogonadism.  Patient was seen several years ago and total T was at the low-side of normal. Patient states that he and his wife are no longer wanting any more children and he would like to discuss possible testosterone replacement therapy.  No labs in the last 16 months.  Patient states that he has had fatigue and energy level has greatly decreased.Normal sexual function.       Allergies:  Patient has no known allergies.    Medications:  has a current medication list which includes the following prescription(s): clonazepam, multivitamin, needle (disp) 21 g, safety needles, syringe (disposable), testosterone cypionate, escitalopram oxalate, and meloxicam.    Review of Systems:  General: No fever, chills, fatigability, or weight loss.  Skin: No rashes, itching, or changes in color or texture of skin.  Chest: Denies LAKHANI, cyanosis, wheezing, cough, and sputum production.  Abdomen: Appetite fine. No weight loss. Denies diarrhea, abdominal pain, hematemesis, or blood in stool.  Musculoskeletal: No joint stiffness or swelling. Denies back pain.  : As above.  All other review of systems negative.    PMH:  Hypogonadism in males    PSH:   has a past surgical history that includes Nose surgery; Forearm fracture surgery; Tympanostomy tube placement; and Sinus surgery.    FamHx: family history includes Diabetes in his maternal grandfather; Heart disease in his maternal grandfather.    SocHx:  reports that he has never smoked. His smokeless tobacco use includes chew. He reports current alcohol use. He reports that he does not use drugs.      Physical Exam:  Vitals:    01/18/23 0942   BP: 134/85   Pulse: 87     General: A&Ox3, no apparent distress, no deformities  Neck: No  masses, normal ROM  Lungs: normal inspiration, no use of accessory muscles  Heart: normal pulse, no arrhythmias  Abdomen: Soft, NT, ND, no masses, no hernias, no hepatosplenomegaly  Skin: The skin is warm and dry. No jaundice.  Ext: No c/c/e.  : 7/23- previous vas site can be palpated, but no specific abnl.  Currently not too tender to palpation.    Labs/Studies:   Testopel  7/26/23, 4/26/23, 1/18/23  Testosterone 297 7/22  Estrogen 118 6/23    Patient was sterilely prepped and draped, then positioned in the right side up, lateral decubitus position.  Lidocaine was used for local anesthesia.  Eleven blade was used to incise the skin, included trocars with the testopel kit were then used.  They were inserted within the incision site and we placed the pellets in a V location.  10 pellets total were inserted without difficulty.  Trocars were removed and pressure was applied for 2 min.  Steri-Strips applied for local coverage, he will return for lab assessment in 3 months.      Impression/Plan:     1. Hypogonadism- patient tolerated insertion of testopel well, call with any complaints.  Patient is also currently on anastrozole.  Otherwise see me in 3 months for labs and reinsertion.      2. Epididymo-orchitis- call with any evidence of recurrence.  Call if post vas issues return.

## 2023-08-04 ENCOUNTER — CLINICAL SUPPORT (OUTPATIENT)
Dept: UROLOGY | Facility: CLINIC | Age: 38
End: 2023-08-04
Payer: COMMERCIAL

## 2023-08-04 DIAGNOSIS — N46.9 MALE STERILITY: Primary | ICD-10-CM

## 2023-08-04 PROCEDURE — 99999 PR PBB SHADOW E&M-EST. PATIENT-LVL II: ICD-10-PCS | Mod: PBBFAC,,,

## 2023-08-04 PROCEDURE — 99999 PR PBB SHADOW E&M-EST. PATIENT-LVL II: CPT | Mod: PBBFAC,,,

## 2023-08-04 NOTE — PROGRESS NOTES
Pt came in today for semen check, Dr. Portillo took at look at it and it was negative. Pt called and notified of results, next appt on 8/23.

## 2023-09-07 ENCOUNTER — OFFICE VISIT (OUTPATIENT)
Dept: PSYCHIATRY | Facility: CLINIC | Age: 38
End: 2023-09-07
Payer: COMMERCIAL

## 2023-09-07 DIAGNOSIS — F41.0 GENERALIZED ANXIETY DISORDER WITH PANIC ATTACKS: Primary | ICD-10-CM

## 2023-09-07 DIAGNOSIS — Z86.59 HISTORY OF POSTTRAUMATIC STRESS DISORDER (PTSD): ICD-10-CM

## 2023-09-07 DIAGNOSIS — F41.1 GENERALIZED ANXIETY DISORDER WITH PANIC ATTACKS: Primary | ICD-10-CM

## 2023-09-07 PROCEDURE — 99214 OFFICE O/P EST MOD 30 MIN: CPT | Mod: 95,,, | Performed by: PSYCHIATRY & NEUROLOGY

## 2023-09-07 PROCEDURE — 99214 PR OFFICE/OUTPT VISIT, EST, LEVL IV, 30-39 MIN: ICD-10-PCS | Mod: 95,,, | Performed by: PSYCHIATRY & NEUROLOGY

## 2023-09-07 RX ORDER — CLONAZEPAM 0.5 MG/1
0.5 TABLET ORAL 3 TIMES DAILY PRN
Qty: 90 TABLET | Refills: 2 | Status: SHIPPED | OUTPATIENT
Start: 2023-09-07 | End: 2023-09-13 | Stop reason: SDUPTHER

## 2023-09-07 NOTE — PROGRESS NOTES
"Outpatient Psychiatry Follow-Up Visit (MD/NP)    9/7/2023      Clinical Status of Patient:  Outpatient (Ambulatory)    Chief Complaint:  Rahul Farrar is a 38 y.o. male who presents today for follow-up of depression and anxiety.  Met with patient.      Interval History and Content of Current Session:  Interim Events/Subjective Report/Content of Current Session: Patient seen and interviewed for follow-up, last seen about six months previously. This was a VIDEO VISIT. He was at home. He reports he and wife's new child was born several months ago. Child is healthy and doing well. Have problems with conflict with wife - she's seeing a therapist for post-partum mental health problems. "Not wanting to be touched". They're working through it. Doing couples therapy. 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 Started swing shifts for about two months ago. Adjusting ok, though sleep is quite disrupted. Doing it for the financial benefits for the family. Had prolonged gastroenteritis, recovered now. No other new health problems. Adherent to medication. Describes ongoing benefit, denies medication side effects.     Background: Pt is a 35 y/o man who presents for psychiatric follow-up, previously a pt of Johanna Boucher, no longer at [facility]. Started seeing Dr. MARADIAGA in August of '20, last saw her in April of this year. Saw Sharda Harmon in therapy from Feb. to May of this year.     Complains of problematic anxiety including intense intermittent anxiety attacks, precipitated by overworry, catastrophizing & thinking of "worst case scenarios". Intense worry focused on his infant choking. Also has intense fear of flying. Realizes anxiety is disproportionate to dangers.   Xanax has been helpful. Trying to get off over time. Sees a therapist for couples counseling. Anxiety about flying.   Buspirone didn't help  Hydroxyzine  Duloxetine    Remote: seroquel, escitalopram, ambien; following dad's suicide. Gained " "weight. Stopped meds, did ok.     Last visit: Struggling with anxiety. He is worried about his daughter choking and is unable to let go of the thought. White coat syndrome: bp goes up    He is really resistant about having medications.     No suicidal ideation   No homicidal ideation          9/7/2023     4:31 PM 3/30/2023     3:17 PM 9/23/2022     1:52 PM   GAD7   1. Feeling nervous, anxious, or on edge? 2 1 1   2. Not being able to stop or control worrying? 2 2 1   3. Worrying too much about different things? 2 1 1   4. Trouble relaxing? 2 1 2   5. Being so restless that it is hard to sit still? 2 2 0   6. Becoming easily annoyed or irritable? 2 2 2   7. Feeling afraid as if something awful might happen? 2 2 2   WENDIE-7 Score 14 11 9     33 yo M who was referred by Dr. Long, anxiety. Currently Xanax .5 mg twice a day. He states that he doesn't take one. She had prescribed it originally. He has tried different medications: buspirone treid it for 3 weeks then prescribed an anti-depressant: They tried different types of medications, the psychiatrist tried lexapro, seroquel, ambien,     June 27th had a baby, found out wife was pregnant October daughter born, new job, & had a lot of anxiety, hard time to get things done. Had a lot on his plate, that was stressful, and he had anxiety about being a dad. Dad has depression     Step dad committed suicide when Rahul was 23 yo, he had a DWI & mom & him were mad at him, Dad texted him. They switched medicines a lot, he was prescribed seroquel & ambien at the same time. Was on lexapro. A lot of problems with sleep, he nightmares, sleep depressed. Mom "checked out". He went to a grief counselor. He was on too much medication: later tapered off of the medications by another doctor who also provided counseling, & he felt that he developed the coping skills to deal with anxiety & grief     His symptoms of anxiety, with anxiety attacks were reactivated when he became anxious about " being a father. The anxiety worsened when his wife had an emergency  & their daughter had complications of jaundice.     Reviewed the following: GAD7 - 5.3.21 (9), 4.19.21 (11) (see notes for specific items):   After the panic attacks, he was doing a lot of things, but his energy levels were down due to feeling overwhelmed.   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    Referral to therapist for treatment of anxiety   Will begin to taper off of xanax once patient is established in therapy    Strengths and Liabilities: Strength: Patient accepts guidance/feedback, Strength: Patient is expressive/articulate., Strength: Patient is motivated for change., Liability: Patient lacks coping skills.    Treatment Goals:  Specify outcomes written in observable, behavioral terms:   Anxiety: reducing negative automatic thoughts, reducing physical symptoms of anxiety and reducing time spent worrying (<30 minutes/day)    Treatment Plan/Recommendations:   Medication Management: Continue current medications. The risks and benefits of medication were discussed with the patient.    Review of Systems   PSYCHIATRIC: Pertinant items are noted in the narrative.    Past Medical, Family and Social History: The patient's  "past medical, family and social history have been reviewed and updated as appropriate within the electronic medical record - see encounter notes.    Compliance: yes    Side effects: None    Risk Parameters:  Patient reports no suicidal ideation  Patient reports no homicidal ideation  Patient reports no self-injurious behavior  Patient reports no violent behavior    Exam (detailed: at least 9 elements; comprehensive: all 15 elements)   Constitutional  Vitals:  Most recent vital signs, dated greater than 90 days prior to this appointment, were reviewed.   Last 3 sets of Vitals        3/30/2023     8:04 AM 4/26/2023    10:54 AM 7/26/2023    10:41 AM   Vitals - 1 value per visit   SYSTOLIC 142 119    DIASTOLIC 90 74    Pulse 85 74    Weight (lb) 223.99 223 223   Weight (kg) 101.6 101.152 101.152   Height 5' 10" (1.778 m)  5' 10" (1.778 m)   BMI (Calculated) 32.1  32   Pain Score Zero Zero Zero          General:  unremarkable, age appropriate     Musculoskeletal  Muscle Strength/Tone:  not examined   Gait & Station:  non-ataxic     Psychiatric  Speech:  no latency; no press   Mood & Affect:  anxious  congruent and appropriate   Thought Process:  normal and logical   Associations:  intact   Thought Content:  suicidal thoughts: (active-no, passive-no), homicidal thoughts: (active-no, passive-no)   Insight:  has awareness of illness   Judgement: behavior is adequate to circumstances   Orientation:  grossly intact   Memory: intact for content of interview   Language: grossly intact   Attention Span & Concentration:  able to focus   Fund of Knowledge:  intact and appropriate to age and level of education     Assessment and Diagnosis   Status/Progress: Based on the examination today, the patient's problem(s) is/are adequately but not ideally controlled.  New problems have not been presented today.    social stressors  are complicating management of the primary condition.  There are no active rule-out diagnoses for this patient " at this time.     General Impression:     Dx: WENDIE    Intervention/Counseling/Treatment Plan   Discussed risks, benefits, and alternatives to treatment plan documented above with patient. I answered all patient questions related to this plan and patient expressed understanding and agreement.   Continue clonazepam. Hasn't tolerated monoamine reuptake medications well.      Return to Clinic: 3 months    KIRTI Rhodes MD

## 2023-09-13 RX ORDER — CLONAZEPAM 0.5 MG/1
0.5 TABLET ORAL 3 TIMES DAILY PRN
Qty: 90 TABLET | Refills: 2 | Status: SHIPPED | OUTPATIENT
Start: 2023-09-13 | End: 2023-09-27

## 2023-09-27 ENCOUNTER — PATIENT MESSAGE (OUTPATIENT)
Dept: PSYCHIATRY | Facility: CLINIC | Age: 38
End: 2023-09-27
Payer: COMMERCIAL

## 2023-09-27 RX ORDER — CLONAZEPAM 1 MG/1
0.5 TABLET ORAL 3 TIMES DAILY PRN
Qty: 45 TABLET | Refills: 2 | Status: SHIPPED | OUTPATIENT
Start: 2023-09-27 | End: 2023-12-22

## 2023-10-27 ENCOUNTER — PROCEDURE VISIT (OUTPATIENT)
Dept: UROLOGY | Facility: CLINIC | Age: 38
End: 2023-10-27
Payer: COMMERCIAL

## 2023-10-27 VITALS
DIASTOLIC BLOOD PRESSURE: 85 MMHG | SYSTOLIC BLOOD PRESSURE: 129 MMHG | BODY MASS INDEX: 32 KG/M2 | HEART RATE: 85 BPM | WEIGHT: 223 LBS

## 2023-10-27 DIAGNOSIS — E29.1 TESTICULAR HYPOGONADISM: Primary | ICD-10-CM

## 2023-10-27 DIAGNOSIS — N45.3 EPIDIDYMO-ORCHITIS: ICD-10-CM

## 2023-10-27 PROCEDURE — S0189 PR TESTOSTERONE PELLET 75 MG: ICD-10-PCS | Mod: S$GLB,,, | Performed by: UROLOGY

## 2023-10-27 PROCEDURE — 11980 PR IMPLANT,HORMONE,SUBCUTANEOUS: ICD-10-PCS | Mod: S$GLB,,, | Performed by: UROLOGY

## 2023-10-27 PROCEDURE — 11980 IMPLANT HORMONE PELLET(S): CPT | Mod: S$GLB,,, | Performed by: UROLOGY

## 2023-10-27 PROCEDURE — S0189 TESTOSTERONE PELLET 75 MG: HCPCS | Mod: S$GLB,,, | Performed by: UROLOGY

## 2023-10-27 RX ORDER — LEVOFLOXACIN 500 MG/1
500 TABLET, FILM COATED ORAL DAILY
Qty: 3 TABLET | Refills: 0 | Status: SHIPPED | OUTPATIENT
Start: 2023-10-27 | End: 2023-10-30

## 2023-10-27 NOTE — PROCEDURES
Procedures  Chief Complaint:   Encounter Diagnoses   Name Primary?    Testicular hypogonadism Yes    Epididymo-orchitis        HPI:   10/27/23- here for his testopel insertion.    7/20/22- LR- Patient is a 36-year-old male that is presenting with concerns of hypogonadism.  Patient was seen several years ago and total T was at the low-side of normal. Patient states that he and his wife are no longer wanting any more children and he would like to discuss possible testosterone replacement therapy.  No labs in the last 16 months.  Patient states that he has had fatigue and energy level has greatly decreased.Normal sexual function.       Allergies:  Patient has no known allergies.    Medications:  has a current medication list which includes the following prescription(s): clonazepam, multivitamin, needle (disp) 21 g, safety needles, syringe (disposable), testosterone cypionate, escitalopram oxalate, and meloxicam.    Review of Systems:  General: No fever, chills, fatigability, or weight loss.  Skin: No rashes, itching, or changes in color or texture of skin.  Chest: Denies LAKHANI, cyanosis, wheezing, cough, and sputum production.  Abdomen: Appetite fine. No weight loss. Denies diarrhea, abdominal pain, hematemesis, or blood in stool.  Musculoskeletal: No joint stiffness or swelling. Denies back pain.  : As above.  All other review of systems negative.    PMH:  Hypogonadism in males    PSH:   has a past surgical history that includes Nose surgery; Forearm fracture surgery; Tympanostomy tube placement; and Sinus surgery.    FamHx: family history includes Diabetes in his maternal grandfather; Heart disease in his maternal grandfather.    SocHx:  reports that he has never smoked. His smokeless tobacco use includes chew. He reports current alcohol use. He reports that he does not use drugs.      Physical Exam:  Vitals:    01/18/23 0942   BP: 134/85   Pulse: 87     General: A&Ox3, no apparent distress, no deformities  Neck: No  masses, normal ROM  Lungs: normal inspiration, no use of accessory muscles  Heart: normal pulse, no arrhythmias  Abdomen: Soft, NT, ND, no masses, no hernias, no hepatosplenomegaly  Skin: The skin is warm and dry. No jaundice.  Ext: No c/c/e.  : 7/23- previous vas site can be palpated, but no specific abnl.  Currently not too tender to palpation.    Labs/Studies:   Testopel  10/27/23, 7/26/23, 4/26/23, 1/18/23  Testosterone 297 7/22  Estrogen 118 6/23    Patient was sterilely prepped and draped, then positioned in the left side up, lateral decubitus position.  Lidocaine was used for local anesthesia.  Eleven blade was used to incise the skin, included trocars with the testopel kit were then used.  They were inserted within the incision site and we placed the pellets in a V location.  10 pellets total were inserted without difficulty.  Trocars were removed and pressure was applied for 2 min.  Steri-Strips applied for local coverage, he will return for lab assessment in 3 months.      Impression/Plan:     1. Hypogonadism- patient tolerated insertion of testopel well, call with any complaints.  Patient is also currently on anastrozole.  Otherwise see me in 3 months for labs and reinsertion.      2. Epididymo-orchitis- call with any evidence of recurrence.

## 2023-12-22 RX ORDER — CLONAZEPAM 1 MG/1
TABLET ORAL
Qty: 45 TABLET | Refills: 1 | Status: SHIPPED | OUTPATIENT
Start: 2023-12-22 | End: 2024-02-19 | Stop reason: SDUPTHER

## 2024-01-24 ENCOUNTER — TELEPHONE (OUTPATIENT)
Dept: UROLOGY | Facility: CLINIC | Age: 39
End: 2024-01-24
Payer: COMMERCIAL

## 2024-01-26 ENCOUNTER — LAB VISIT (OUTPATIENT)
Dept: LAB | Facility: HOSPITAL | Age: 39
End: 2024-01-26
Attending: UROLOGY
Payer: COMMERCIAL

## 2024-01-26 DIAGNOSIS — E29.1 TESTICULAR HYPOGONADISM: ICD-10-CM

## 2024-01-26 LAB
ALBUMIN SERPL BCP-MCNC: 4 G/DL (ref 3.5–5.2)
ALP SERPL-CCNC: 63 U/L (ref 55–135)
ALT SERPL W/O P-5'-P-CCNC: 42 U/L (ref 10–44)
AST SERPL-CCNC: 25 U/L (ref 10–40)
BASOPHILS # BLD AUTO: 0.05 K/UL (ref 0–0.2)
BASOPHILS NFR BLD: 1 % (ref 0–1.9)
BILIRUB DIRECT SERPL-MCNC: 0.3 MG/DL (ref 0.1–0.3)
BILIRUB SERPL-MCNC: 1.1 MG/DL (ref 0.1–1)
DIFFERENTIAL METHOD BLD: ABNORMAL
EOSINOPHIL # BLD AUTO: 0.1 K/UL (ref 0–0.5)
EOSINOPHIL NFR BLD: 2.7 % (ref 0–8)
ERYTHROCYTE [DISTWIDTH] IN BLOOD BY AUTOMATED COUNT: 11.3 % (ref 11.5–14.5)
HCT VFR BLD AUTO: 47.3 % (ref 40–54)
HGB BLD-MCNC: 16.5 G/DL (ref 14–18)
IMM GRANULOCYTES # BLD AUTO: 0 K/UL (ref 0–0.04)
IMM GRANULOCYTES NFR BLD AUTO: 0 % (ref 0–0.5)
LYMPHOCYTES # BLD AUTO: 2 K/UL (ref 1–4.8)
LYMPHOCYTES NFR BLD: 39.9 % (ref 18–48)
MCH RBC QN AUTO: 30.6 PG (ref 27–31)
MCHC RBC AUTO-ENTMCNC: 34.9 G/DL (ref 32–36)
MCV RBC AUTO: 88 FL (ref 82–98)
MONOCYTES # BLD AUTO: 0.4 K/UL (ref 0.3–1)
MONOCYTES NFR BLD: 7.2 % (ref 4–15)
NEUTROPHILS # BLD AUTO: 2.4 K/UL (ref 1.8–7.7)
NEUTROPHILS NFR BLD: 49.2 % (ref 38–73)
NRBC BLD-RTO: 0 /100 WBC
PLATELET # BLD AUTO: 211 K/UL (ref 150–450)
PMV BLD AUTO: 12.1 FL (ref 9.2–12.9)
PROT SERPL-MCNC: 7.1 G/DL (ref 6–8.4)
RBC # BLD AUTO: 5.39 M/UL (ref 4.6–6.2)
TESTOST SERPL-MCNC: 309 NG/DL (ref 304–1227)
WBC # BLD AUTO: 4.89 K/UL (ref 3.9–12.7)

## 2024-01-26 PROCEDURE — 85025 COMPLETE CBC W/AUTO DIFF WBC: CPT | Performed by: UROLOGY

## 2024-01-26 PROCEDURE — 84403 ASSAY OF TOTAL TESTOSTERONE: CPT | Performed by: UROLOGY

## 2024-01-26 PROCEDURE — 80076 HEPATIC FUNCTION PANEL: CPT | Performed by: UROLOGY

## 2024-01-26 PROCEDURE — 82672 ASSAY OF ESTROGEN: CPT | Performed by: UROLOGY

## 2024-01-29 LAB — ESTROGEN SERPL-MCNC: 50 PG/ML

## 2024-01-31 ENCOUNTER — PROCEDURE VISIT (OUTPATIENT)
Dept: UROLOGY | Facility: CLINIC | Age: 39
End: 2024-01-31
Payer: COMMERCIAL

## 2024-01-31 DIAGNOSIS — N45.3 EPIDIDYMO-ORCHITIS: ICD-10-CM

## 2024-01-31 DIAGNOSIS — E29.1 TESTICULAR HYPOGONADISM: Primary | ICD-10-CM

## 2024-01-31 PROCEDURE — 11980 IMPLANT HORMONE PELLET(S): CPT | Mod: S$GLB,,, | Performed by: UROLOGY

## 2024-01-31 PROCEDURE — S0189 TESTOSTERONE PELLET 75 MG: HCPCS | Mod: S$GLB,,, | Performed by: UROLOGY

## 2024-01-31 RX ORDER — LEVOFLOXACIN 500 MG/1
500 TABLET, FILM COATED ORAL DAILY
Qty: 3 TABLET | Refills: 0 | Status: SHIPPED | OUTPATIENT
Start: 2024-01-31 | End: 2024-02-03

## 2024-01-31 NOTE — PROCEDURES
Procedures  Chief Complaint:   Encounter Diagnoses   Name Primary?    Testicular hypogonadism Yes    Epididymo-orchitis        HPI:   1/31/24- here for his testopel insertion.    7/20/22- LR- Patient is a 36-year-old male that is presenting with concerns of hypogonadism.  Patient was seen several years ago and total T was at the low-side of normal. Patient states that he and his wife are no longer wanting any more children and he would like to discuss possible testosterone replacement therapy.  No labs in the last 16 months.  Patient states that he has had fatigue and energy level has greatly decreased.Normal sexual function.       Allergies:  Patient has no known allergies.    Medications:  has a current medication list which includes the following prescription(s): clonazepam, multivitamin, needle (disp) 21 g, safety needles, syringe (disposable), testosterone cypionate, escitalopram oxalate, and meloxicam.    Review of Systems:  General: No fever, chills, fatigability, or weight loss.  Skin: No rashes, itching, or changes in color or texture of skin.  Chest: Denies LAKHANI, cyanosis, wheezing, cough, and sputum production.  Abdomen: Appetite fine. No weight loss. Denies diarrhea, abdominal pain, hematemesis, or blood in stool.  Musculoskeletal: No joint stiffness or swelling. Denies back pain.  : As above.  All other review of systems negative.    PMH:  Hypogonadism in males    PSH:   has a past surgical history that includes Nose surgery; Forearm fracture surgery; Tympanostomy tube placement; and Sinus surgery.    FamHx: family history includes Diabetes in his maternal grandfather; Heart disease in his maternal grandfather.    SocHx:  reports that he has never smoked. His smokeless tobacco use includes chew. He reports current alcohol use. He reports that he does not use drugs.      Physical Exam:  Vitals:    01/18/23 0942   BP: 134/85   Pulse: 87     General: A&Ox3, no apparent distress, no deformities  Neck: No  masses, normal ROM  Lungs: normal inspiration, no use of accessory muscles  Heart: normal pulse, no arrhythmias  Abdomen: Soft, NT, ND, no masses, no hernias, no hepatosplenomegaly  Skin: The skin is warm and dry. No jaundice.  Ext: No c/c/e.  : 7/23- previous vas site can be palpated, but no specific abnl.  Currently not too tender to palpation.    Labs/Studies:   Testopel  1/31/24, 10/27/23, 7/26/23, 4/26/23, 1/18/23  Testosterone 297 7/22  Estrogen 50 1/24  Estrogen 118 6/23    Patient was sterilely prepped and draped, then positioned in the right side up, lateral decubitus position.  Lidocaine was used for local anesthesia.  Eleven blade was used to incise the skin, included trocars with the testopel kit were then used.  They were inserted within the incision site and we placed the pellets in a V location.  10 pellets total were inserted without difficulty.  Trocars were removed and pressure was applied for 2 min.  Steri-Strips applied for local coverage, he will return for lab assessment in 3 months.      Impression/Plan:     1. Hypogonadism- patient tolerated insertion of testopel well, call with any complaints.  Patient is also currently on anastrozole.  Otherwise see me in 3 months for labs and reinsertion.      2. Epididymo-orchitis- call with any evidence of recurrence.

## 2024-02-16 NOTE — TELEPHONE ENCOUNTER
Addended by: JAMES SOSA on: 2/16/2024 10:31 AM     Modules accepted: Orders     Called the patient, after verification of name and , the patient was informed  that Dr Portillo does not anything else available until March. Pt stated that he will keep the appt he has now.       ----- Message from Jacki Fountain sent at 2024 12:13 PM CST -----  Regarding: R/S Procedure  Contact: [phone]  RESCHEDULE/APPTS    Current Appt Date:  2024    Type of Appt: Procedure    Physician:  Lindsey    Reason for Scheduling:  Pt has to work this date and is requesting it to be r/s to 2024 or 2024.    Caller:  JEREMIAH DE LA CRUZ [[id number]]    Contact Preference:  [phone]

## 2024-02-19 RX ORDER — CLONAZEPAM 1 MG/1
TABLET ORAL
Qty: 45 TABLET | Refills: 1 | Status: SHIPPED | OUTPATIENT
Start: 2024-02-19 | End: 2024-04-15 | Stop reason: SDUPTHER

## 2024-04-15 RX ORDER — CLONAZEPAM 1 MG/1
TABLET ORAL
Qty: 45 TABLET | Refills: 1 | OUTPATIENT
Start: 2024-04-15

## 2024-04-15 RX ORDER — CLONAZEPAM 1 MG/1
TABLET ORAL
Qty: 45 TABLET | Refills: 1 | Status: SHIPPED | OUTPATIENT
Start: 2024-04-15

## 2024-04-17 ENCOUNTER — PROCEDURE VISIT (OUTPATIENT)
Dept: UROLOGY | Facility: CLINIC | Age: 39
End: 2024-04-17
Payer: COMMERCIAL

## 2024-04-17 DIAGNOSIS — N45.3 EPIDIDYMO-ORCHITIS: ICD-10-CM

## 2024-04-17 DIAGNOSIS — E29.1 TESTICULAR HYPOGONADISM: Primary | ICD-10-CM

## 2024-04-17 PROCEDURE — S0189 TESTOSTERONE PELLET 75 MG: HCPCS | Mod: S$GLB,,, | Performed by: UROLOGY

## 2024-04-17 PROCEDURE — 11980 IMPLANT HORMONE PELLET(S): CPT | Mod: S$GLB,,, | Performed by: UROLOGY

## 2024-04-17 RX ORDER — LEVOFLOXACIN 500 MG/1
500 TABLET, FILM COATED ORAL DAILY
Qty: 3 TABLET | Refills: 0 | Status: SHIPPED | OUTPATIENT
Start: 2024-04-17 | End: 2024-04-20

## 2024-04-17 NOTE — PROCEDURES
Procedures  Chief Complaint:   Encounter Diagnoses   Name Primary?    Testicular hypogonadism Yes    Epididymo-orchitis        HPI:   4/17/24- here for his testopel insertion.    7/20/22- LR- Patient is a 36-year-old male that is presenting with concerns of hypogonadism.  Patient was seen several years ago and total T was at the low-side of normal. Patient states that he and his wife are no longer wanting any more children and he would like to discuss possible testosterone replacement therapy.  No labs in the last 16 months.  Patient states that he has had fatigue and energy level has greatly decreased.Normal sexual function.       Allergies:  Patient has no known allergies.    Medications:  has a current medication list which includes the following prescription(s): clonazepam, multivitamin, needle (disp) 21 g, safety needles, syringe (disposable), testosterone cypionate, escitalopram oxalate, and meloxicam.    Review of Systems:  General: No fever, chills, fatigability, or weight loss.  Skin: No rashes, itching, or changes in color or texture of skin.  Chest: Denies LAKHANI, cyanosis, wheezing, cough, and sputum production.  Abdomen: Appetite fine. No weight loss. Denies diarrhea, abdominal pain, hematemesis, or blood in stool.  Musculoskeletal: No joint stiffness or swelling. Denies back pain.  : As above.  All other review of systems negative.    PMH:  Hypogonadism in males    PSH:   has a past surgical history that includes Nose surgery; Forearm fracture surgery; Tympanostomy tube placement; and Sinus surgery.    FamHx: family history includes Diabetes in his maternal grandfather; Heart disease in his maternal grandfather.    SocHx:  reports that he has never smoked. His smokeless tobacco use includes chew. He reports current alcohol use. He reports that he does not use drugs.      Physical Exam:  Vitals:    01/18/23 0942   BP: 134/85   Pulse: 87     General: A&Ox3, no apparent distress, no deformities  Neck: No  masses, normal ROM  Lungs: normal inspiration, no use of accessory muscles  Heart: normal pulse, no arrhythmias  Abdomen: Soft, NT, ND, no masses, no hernias, no hepatosplenomegaly  Skin: The skin is warm and dry. No jaundice.  Ext: No c/c/e.  : 7/23- previous vas site can be palpated, but no specific abnl.  Currently not too tender to palpation.    Labs/Studies:   Testopel  4/17/24, 1/31/24, 10/27/23, 7/26/23, 4/26/23, 1/18/23  Testosterone 297 7/22  Estrogen 50 1/24  Estrogen 118 6/23    Patient was sterilely prepped and draped, then positioned in the left side up, lateral decubitus position.  Lidocaine was used for local anesthesia.  Eleven blade was used to incise the skin, included trocars with the testopel kit were then used.  They were inserted within the incision site and we placed the pellets in a V location.  6 pellets total were inserted without difficulty.  Trocars were removed and pressure was applied for 2 min.  Steri-Strips applied for local coverage, he will return for lab assessment in 3 months.      Impression/Plan:     1. Hypogonadism- patient tolerated insertion of testopel well, call with any complaints.  Patient is also currently on anastrozole.  Otherwise see me in 3 months for labs and reinsertion.      2. Epididymo-orchitis- call with any evidence of recurrence.

## 2024-04-18 RX ORDER — ANASTROZOLE 1 MG/1
1 TABLET ORAL
Qty: 15 TABLET | Refills: 5 | Status: SHIPPED | OUTPATIENT
Start: 2024-04-19

## 2024-05-01 ENCOUNTER — PATIENT MESSAGE (OUTPATIENT)
Dept: UROLOGY | Facility: CLINIC | Age: 39
End: 2024-05-01
Payer: COMMERCIAL

## 2024-05-22 ENCOUNTER — TELEPHONE (OUTPATIENT)
Dept: PSYCHIATRY | Facility: CLINIC | Age: 39
End: 2024-05-22
Payer: COMMERCIAL

## 2024-05-24 ENCOUNTER — OFFICE VISIT (OUTPATIENT)
Dept: PSYCHIATRY | Facility: CLINIC | Age: 39
End: 2024-05-24
Payer: COMMERCIAL

## 2024-05-24 ENCOUNTER — OFFICE VISIT (OUTPATIENT)
Dept: FAMILY MEDICINE | Facility: CLINIC | Age: 39
End: 2024-05-24
Payer: COMMERCIAL

## 2024-05-24 VITALS
SYSTOLIC BLOOD PRESSURE: 122 MMHG | HEIGHT: 70 IN | OXYGEN SATURATION: 96 % | HEART RATE: 85 BPM | DIASTOLIC BLOOD PRESSURE: 80 MMHG | TEMPERATURE: 98 F | BODY MASS INDEX: 31.78 KG/M2 | WEIGHT: 222 LBS

## 2024-05-24 VITALS
SYSTOLIC BLOOD PRESSURE: 119 MMHG | WEIGHT: 221.81 LBS | DIASTOLIC BLOOD PRESSURE: 79 MMHG | BODY MASS INDEX: 31.82 KG/M2 | HEART RATE: 80 BPM

## 2024-05-24 DIAGNOSIS — Z86.59 HISTORY OF POSTTRAUMATIC STRESS DISORDER (PTSD): ICD-10-CM

## 2024-05-24 DIAGNOSIS — Z00.00 ANNUAL PHYSICAL EXAM: Primary | ICD-10-CM

## 2024-05-24 DIAGNOSIS — F41.1 GENERALIZED ANXIETY DISORDER WITH PANIC ATTACKS: Primary | ICD-10-CM

## 2024-05-24 DIAGNOSIS — F41.0 GENERALIZED ANXIETY DISORDER WITH PANIC ATTACKS: Primary | ICD-10-CM

## 2024-05-24 LAB
BILIRUB UR QL STRIP: NEGATIVE
CLARITY UR REFRACT.AUTO: ABNORMAL
COLOR UR AUTO: YELLOW
GLUCOSE UR QL STRIP: NEGATIVE
HGB UR QL STRIP: NEGATIVE
KETONES UR QL STRIP: NEGATIVE
LEUKOCYTE ESTERASE UR QL STRIP: NEGATIVE
MICROSCOPIC COMMENT: NORMAL
NITRITE UR QL STRIP: NEGATIVE
PH UR STRIP: 7 [PH] (ref 5–8)
PROT UR QL STRIP: NEGATIVE
SP GR UR STRIP: >=1.03 (ref 1–1.03)
URN SPEC COLLECT METH UR: ABNORMAL

## 2024-05-24 PROCEDURE — 90833 PSYTX W PT W E/M 30 MIN: CPT | Mod: S$GLB,,, | Performed by: PSYCHIATRY & NEUROLOGY

## 2024-05-24 PROCEDURE — 99999 PR PBB SHADOW E&M-EST. PATIENT-LVL II: CPT | Mod: PBBFAC,,, | Performed by: PSYCHIATRY & NEUROLOGY

## 2024-05-24 PROCEDURE — 99999 PR PBB SHADOW E&M-EST. PATIENT-LVL III: CPT | Mod: PBBFAC,,, | Performed by: NURSE PRACTITIONER

## 2024-05-24 PROCEDURE — 99214 OFFICE O/P EST MOD 30 MIN: CPT | Mod: S$GLB,,, | Performed by: PSYCHIATRY & NEUROLOGY

## 2024-05-24 PROCEDURE — 99395 PREV VISIT EST AGE 18-39: CPT | Mod: S$GLB,,, | Performed by: NURSE PRACTITIONER

## 2024-05-24 PROCEDURE — 87491 CHLMYD TRACH DNA AMP PROBE: CPT | Performed by: NURSE PRACTITIONER

## 2024-05-24 PROCEDURE — 81001 URINALYSIS AUTO W/SCOPE: CPT | Performed by: NURSE PRACTITIONER

## 2024-05-24 RX ORDER — FLUOXETINE 10 MG/1
CAPSULE ORAL
Qty: 60 CAPSULE | Refills: 3 | Status: SHIPPED | OUTPATIENT
Start: 2024-05-24

## 2024-05-24 RX ORDER — BENZONATATE 100 MG/1
200 CAPSULE ORAL 2 TIMES DAILY PRN
COMMUNITY
Start: 2024-05-15

## 2024-05-24 RX ORDER — DOXYCYCLINE 100 MG/1
100 CAPSULE ORAL EVERY 12 HOURS
COMMUNITY
Start: 2024-05-15

## 2024-05-24 RX ORDER — ALBUTEROL SULFATE 90 UG/1
2 AEROSOL, METERED RESPIRATORY (INHALATION)
COMMUNITY
Start: 2024-05-15 | End: 2024-11-11

## 2024-05-24 RX ORDER — CLONAZEPAM 0.5 MG/1
TABLET ORAL
Qty: 90 TABLET | Refills: 3 | Status: SHIPPED | OUTPATIENT
Start: 2024-05-24

## 2024-05-24 NOTE — PROGRESS NOTES
"Outpatient Psychiatry Follow-Up Visit (MD/NP)    5/24/2024    Clinical Status of Patient:  Outpatient (Ambulatory)    Chief Complaint:  Rahul Farrar is a 38 y.o. male who presents today for follow-up of depression and anxiety.  Met with patient.      Interval History and Content of Current Session:  Interim Events/Subjective Report/Content of Current Session: Patient seen and interviewed for follow-up, last seen about eight months previously. Reports increased stress at work, marital conflict. Wife threatened to leave, eventually decided to do that (but never did because she was worried about his mental health and they're now working on the relationship). Had suicidal thoughts. He's doing individual and couples and encouraging her to go to her own therapy because he's beginning to realize her own mental health issues contribute to the marital conflict. Considered returning to fluoxetine. Work stress is relatively unmodifiable in the short term.     Background: Pt is a 35 y/o man who presents for psychiatric follow-up, previously a pt of Johanna Boucher, no longer at [facility]. Started seeing Dr. MARADIAGA in August of '20, last saw her in April of this year. Saw Sharda Harmon in therapy from Feb. to May of this year.     Complains of problematic anxiety including intense intermittent anxiety attacks, precipitated by overworry, catastrophizing & thinking of "worst case scenarios". Intense worry focused on his infant choking. Also has intense fear of flying. Realizes anxiety is disproportionate to dangers.   Xanax has been helpful. Trying to get off over time. Sees a therapist for couples counseling. Anxiety about flying.   Buspirone didn't help  Hydroxyzine  Duloxetine  Bupropion     Remote: seroquel, escitalopram, ambien; following dad's suicide. Gained weight. Stopped meds, did ok.     Last visit: Struggling with anxiety. He is worried about his daughter choking and is unable to let go of the thought. White coat " "syndrome: bp goes up    He is really resistant about having medications.     No suicidal ideation   No homicidal ideation          2023     4:31 PM 3/30/2023     3:17 PM 2022     1:52 PM   GAD7   1. Feeling nervous, anxious, or on edge? 2 1 1   2. Not being able to stop or control worrying? 2 2 1   3. Worrying too much about different things? 2 1 1   4. Trouble relaxing? 2 1 2   5. Being so restless that it is hard to sit still? 2 2 0   6. Becoming easily annoyed or irritable? 2 2 2   7. Feeling afraid as if something awful might happen? 2 2 2   WENDIE-7 Score 14 11 9     35 yo M who was referred by Dr. Long, anxiety. Currently Xanax .5 mg twice a day. He states that he doesn't take one. She had prescribed it originally. He has tried different medications: buspirone treid it for 3 weeks then prescribed an anti-depressant: They tried different types of medications, the psychiatrist tried lexapro, seroquel, ambien,      had a baby, found out wife was pregnant October daughter born, new job, & had a lot of anxiety, hard time to get things done. Had a lot on his plate, that was stressful, and he had anxiety about being a dad. Dad has depression     Step dad committed suicide when Rahul was 21 yo, he had a DWI & mom & him were mad at him, Dad texted him. They switched medicines a lot, he was prescribed seroquel & ambien at the same time. Was on lexapro. A lot of problems with sleep, he nightmares, sleep depressed. Mom "checked out". He went to a grief counselor. He was on too much medication: later tapered off of the medications by another doctor who also provided counseling, & he felt that he developed the coping skills to deal with anxiety & grief     His symptoms of anxiety, with anxiety attacks were reactivated when he became anxious about being a father. The anxiety worsened when his wife had an emergency  & their daughter had complications of jaundice.     Reviewed the following: GAD7 - " 5.3.21 (9), 4.19.21 (11) (see notes for specific items):   After the panic attacks, he was doing a lot of things, but his energy levels were down due to feeling overwhelmed.   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    Referral to therapist for treatment of anxiety   Will begin to taper off of xanax once patient is established in therapy    Strengths and Liabilities: Strength: Patient accepts guidance/feedback, Strength: Patient is expressive/articulate., Strength: Patient is motivated for change., Liability: Patient lacks coping skills.    Treatment Goals:  Specify outcomes written in observable, behavioral terms:   Anxiety: reducing negative automatic thoughts, reducing physical symptoms of anxiety and reducing time spent worrying (<30 minutes/day)    Treatment Plan/Recommendations:   Medication Management: Continue current medications. The risks and benefits of medication were discussed with the patient.    Review of Systems   PSYCHIATRIC: Pertinant items are noted in the narrative.    Past Medical, Family and Social History: The patient's past medical, family and social history have been reviewed and updated as appropriate within the electronic medical record - see encounter notes.    Compliance:  "yes    Side effects: None    Risk Parameters:  Patient reports no suicidal ideation  Patient reports no homicidal ideation  Patient reports no self-injurious behavior  Patient reports no violent behavior    Exam (detailed: at least 9 elements; comprehensive: all 15 elements)   Constitutional  Vitals:  Most recent vital signs, dated greater than 90 days prior to this appointment, were reviewed.   Last 3 sets of Vitals        10/27/2023     8:58 AM 5/24/2024    11:49 AM 5/24/2024     2:04 PM   Vitals - 1 value per visit   SYSTOLIC 129 122 119   DIASTOLIC 85 80 79   Pulse 85 85 80   Temp  98.3 °F (36.8 °C)    SPO2  96 %    Weight (lb) 223 222 221.78   Weight (kg) 101.152 100.7 100.6   Height  5' 10" (1.778 m)    BMI (Calculated)  31.9 31.8   Pain Score Zero Zero           General:  unremarkable, age appropriate     Musculoskeletal  Muscle Strength/Tone:  not examined   Gait & Station:  non-ataxic     Psychiatric  Speech:  no latency; no press   Mood & Affect:  anxious  congruent and appropriate   Thought Process:  normal and logical   Associations:  intact   Thought Content:  suicidal thoughts: (active-no, passive-no), homicidal thoughts: (active-no, passive-no)   Insight:  has awareness of illness   Judgement: behavior is adequate to circumstances   Orientation:  grossly intact   Memory: intact for content of interview   Language: grossly intact   Attention Span & Concentration:  able to focus   Fund of Knowledge:  intact and appropriate to age and level of education     Assessment and Diagnosis   Status/Progress: Based on the examination today, the patient's problem(s) is/are adequately but not ideally controlled.  New problems have not been presented today.    social stressors  are complicating management of the primary condition.  There are no active rule-out diagnoses for this patient at this time.     General Impression:     Dx: WENDIE    Intervention/Counseling/Treatment Plan   Discussed risks, benefits, and " alternatives to treatment plan documented above with patient. I answered all patient questions related to this plan and patient expressed understanding and agreement.   Start fluoxetine - very low dose due to poor monoamine tolerance in past. Continue clonazepam.   Psychotherapy prn.   Psychotherapy today - build motivation for sustaining changes.       Return to Clinic: 3 months    KIRTI Rhodes MD

## 2024-05-26 LAB
C TRACH DNA SPEC QL NAA+PROBE: NOT DETECTED
N GONORRHOEA DNA SPEC QL NAA+PROBE: NOT DETECTED

## 2024-05-27 NOTE — PROGRESS NOTES
Subjective:       Patient ID: Rahul Farrar is a 38 y.o. male.    Chief Complaint: Establish Care  Pt reports to clinic for annual exam.  Non smoker, ETOH: rare reports previous heavy consumption several years ago.  Does not monitor diet, no exercise regimen.  FMH: CAD, DM2, mental illness.  Pt is concerned about liver function due to previous ETOH use    Past Medical History:   Diagnosis Date    Anxiety     Low testosterone         Current Outpatient Medications on File Prior to Visit   Medication Sig Dispense Refill    albuterol (PROVENTIL/VENTOLIN HFA) 90 mcg/actuation inhaler Inhale 2 puffs into the lungs.      anastrozole (ARIMIDEX) 1 mg Tab Take 1 tablet (1 mg total) by mouth 3 (three) times a week. 15 tablet 5    benzonatate (TESSALON) 100 MG capsule Take 200 mg by mouth 2 (two) times daily as needed.      clonazePAM (KLONOPIN) 1 MG tablet TAKE 1/2 TABLET(0.5 MG) BY MOUTH THREE TIMES DAILY AS NEEDED FOR ANXIETY 45 tablet 1    doxycycline (VIBRAMYCIN) 100 MG Cap Take 100 mg by mouth every 12 (twelve) hours.      multivitamin (THERAGRAN) per tablet Take 1 tablet by mouth.      clonazePAM (KLONOPIN) 0.5 MG tablet Take 1 tablet three times daily as needed for anxiety. 90 tablet 3    FLUoxetine 10 MG capsule Take 1 capsule daily for 7 days then 2 daily thereafter. 60 capsule 3     No current facility-administered medications on file prior to visit.      HPI  Review of Systems   Constitutional: Negative.    HENT: Negative.     Respiratory: Negative.     Cardiovascular: Negative.    Gastrointestinal: Negative.    Genitourinary: Negative.    Musculoskeletal: Negative.    Neurological: Negative.    Psychiatric/Behavioral: Negative.         Objective:      Physical Exam  Vitals reviewed.   Constitutional:       Appearance: He is well-developed.   HENT:      Head: Normocephalic.   Eyes:      Pupils: Pupils are equal, round, and reactive to light.   Cardiovascular:      Rate and Rhythm: Normal rate and regular  rhythm.      Heart sounds: Normal heart sounds.   Pulmonary:      Effort: No respiratory distress.      Breath sounds: Normal breath sounds.   Abdominal:      General: Bowel sounds are normal.      Palpations: Abdomen is soft.   Musculoskeletal:         General: Normal range of motion.      Cervical back: Normal range of motion.   Skin:     General: Skin is warm and dry.   Neurological:      Mental Status: He is alert and oriented to person, place, and time.         Assessment:       1. Annual physical exam        Plan:   Annual physical exam  -     CBC Auto Differential; Future; Expected date: 05/24/2024  -     Comprehensive Metabolic Panel; Future; Expected date: 05/24/2024  -     Lipid Panel; Future; Expected date: 05/24/2024  -     Urinalysis; Future; Expected date: 05/24/2024  -     C. trachomatis/N. gonorrhoeae by AMP DNA  -     HIV 1/2 Ag/Ab (4th Gen); Future; Expected date: 05/24/2024  -     HEPATITIS C ANTIBODY; Future; Expected date: 05/24/2024  -     Hemoglobin A1C; Future; Expected date: 05/24/2024    Other orders  -     Urinalysis Microscopic      No follow-ups on file.

## 2024-06-20 RX ORDER — CLONAZEPAM 0.5 MG/1
TABLET ORAL
Qty: 90 TABLET | Refills: 3 | OUTPATIENT
Start: 2024-06-20

## 2024-07-17 ENCOUNTER — LAB VISIT (OUTPATIENT)
Dept: LAB | Facility: HOSPITAL | Age: 39
End: 2024-07-17
Attending: UROLOGY
Payer: COMMERCIAL

## 2024-07-17 DIAGNOSIS — E29.1 TESTICULAR HYPOGONADISM: ICD-10-CM

## 2024-07-17 LAB
ALBUMIN SERPL BCP-MCNC: 4.2 G/DL (ref 3.5–5.2)
ALP SERPL-CCNC: 57 U/L (ref 55–135)
ALT SERPL W/O P-5'-P-CCNC: 24 U/L (ref 10–44)
AST SERPL-CCNC: 18 U/L (ref 10–40)
BASOPHILS # BLD AUTO: 0.03 K/UL (ref 0–0.2)
BASOPHILS NFR BLD: 0.4 % (ref 0–1.9)
BILIRUB DIRECT SERPL-MCNC: 0.3 MG/DL (ref 0.1–0.3)
BILIRUB SERPL-MCNC: 1 MG/DL (ref 0.1–1)
DIFFERENTIAL METHOD BLD: ABNORMAL
EOSINOPHIL # BLD AUTO: 0 K/UL (ref 0–0.5)
EOSINOPHIL NFR BLD: 0 % (ref 0–8)
ERYTHROCYTE [DISTWIDTH] IN BLOOD BY AUTOMATED COUNT: 11.9 % (ref 11.5–14.5)
HCT VFR BLD AUTO: 49.3 % (ref 40–54)
HGB BLD-MCNC: 16.7 G/DL (ref 14–18)
IMM GRANULOCYTES # BLD AUTO: 0.03 K/UL (ref 0–0.04)
IMM GRANULOCYTES NFR BLD AUTO: 0.4 % (ref 0–0.5)
LYMPHOCYTES # BLD AUTO: 1.5 K/UL (ref 1–4.8)
LYMPHOCYTES NFR BLD: 21.2 % (ref 18–48)
MCH RBC QN AUTO: 30 PG (ref 27–31)
MCHC RBC AUTO-ENTMCNC: 33.9 G/DL (ref 32–36)
MCV RBC AUTO: 89 FL (ref 82–98)
MONOCYTES # BLD AUTO: 0.2 K/UL (ref 0.3–1)
MONOCYTES NFR BLD: 3 % (ref 4–15)
NEUTROPHILS # BLD AUTO: 5.4 K/UL (ref 1.8–7.7)
NEUTROPHILS NFR BLD: 75 % (ref 38–73)
NRBC BLD-RTO: 0 /100 WBC
PLATELET # BLD AUTO: 271 K/UL (ref 150–450)
PMV BLD AUTO: 12.1 FL (ref 9.2–12.9)
PROT SERPL-MCNC: 7.3 G/DL (ref 6–8.4)
RBC # BLD AUTO: 5.56 M/UL (ref 4.6–6.2)
WBC # BLD AUTO: 7.23 K/UL (ref 3.9–12.7)

## 2024-07-17 PROCEDURE — 82671 ASSAY OF ESTROGENS: CPT | Performed by: UROLOGY

## 2024-07-17 PROCEDURE — 80076 HEPATIC FUNCTION PANEL: CPT | Performed by: UROLOGY

## 2024-07-17 PROCEDURE — 36415 COLL VENOUS BLD VENIPUNCTURE: CPT | Performed by: UROLOGY

## 2024-07-17 PROCEDURE — 84403 ASSAY OF TOTAL TESTOSTERONE: CPT | Performed by: UROLOGY

## 2024-07-17 PROCEDURE — 85025 COMPLETE CBC W/AUTO DIFF WBC: CPT | Performed by: UROLOGY

## 2024-07-18 LAB — TESTOST SERPL-MCNC: 1148 NG/DL (ref 304–1227)

## 2024-07-23 LAB
ESTRADIOL SERPL HS-MCNC: 14 PG/ML (ref 10–42)
ESTROGEN SERPL CALC-MCNC: 28 PG/ML (ref 19–69)
ESTRONE SERPL-MCNC: 14 PG/ML (ref 9–36)

## 2024-07-24 ENCOUNTER — PROCEDURE VISIT (OUTPATIENT)
Dept: UROLOGY | Facility: CLINIC | Age: 39
End: 2024-07-24
Payer: COMMERCIAL

## 2024-07-24 DIAGNOSIS — N45.3 EPIDIDYMO-ORCHITIS: ICD-10-CM

## 2024-07-24 DIAGNOSIS — E29.1 TESTICULAR HYPOGONADISM: Primary | ICD-10-CM

## 2024-07-24 RX ORDER — LEVOFLOXACIN 500 MG/1
500 TABLET, FILM COATED ORAL DAILY
Qty: 3 TABLET | Refills: 0 | Status: SHIPPED | OUTPATIENT
Start: 2024-07-24 | End: 2024-07-27

## 2024-07-24 NOTE — PROCEDURES
Procedures  Chief Complaint:   Encounter Diagnoses   Name Primary?    Testicular hypogonadism Yes    Epididymo-orchitis        HPI:   7/24/24- here for his testopel insertion.    7/20/22- LR- Patient is a 36-year-old male that is presenting with concerns of hypogonadism.  Patient was seen several years ago and total T was at the low-side of normal. Patient states that he and his wife are no longer wanting any more children and he would like to discuss possible testosterone replacement therapy.  No labs in the last 16 months.  Patient states that he has had fatigue and energy level has greatly decreased.Normal sexual function.       Allergies:  Patient has no known allergies.    Medications:  has a current medication list which includes the following prescription(s): clonazepam, multivitamin, needle (disp) 21 g, safety needles, syringe (disposable), testosterone cypionate, escitalopram oxalate, and meloxicam.    Review of Systems:  General: No fever, chills, fatigability, or weight loss.  Skin: No rashes, itching, or changes in color or texture of skin.  Chest: Denies LAKHANI, cyanosis, wheezing, cough, and sputum production.  Abdomen: Appetite fine. No weight loss. Denies diarrhea, abdominal pain, hematemesis, or blood in stool.  Musculoskeletal: No joint stiffness or swelling. Denies back pain.  : As above.  All other review of systems negative.    PMH:  Hypogonadism in males    PSH:   has a past surgical history that includes Nose surgery; Forearm fracture surgery; Tympanostomy tube placement; and Sinus surgery.    FamHx: family history includes Diabetes in his maternal grandfather; Heart disease in his maternal grandfather.    SocHx:  reports that he has never smoked. His smokeless tobacco use includes chew. He reports current alcohol use. He reports that he does not use drugs.      Physical Exam:  Vitals:    01/18/23 0942   BP: 134/85   Pulse: 87     General: A&Ox3, no apparent distress, no deformities  Neck: No  masses, normal ROM  Lungs: normal inspiration, no use of accessory muscles  Heart: normal pulse, no arrhythmias  Abdomen: Soft, NT, ND, no masses, no hernias, no hepatosplenomegaly  Skin: The skin is warm and dry. No jaundice.  Ext: No c/c/e.  : 7/23- previous vas site can be palpated, but no specific abnl.  Currently not too tender to palpation.    Labs/Studies:   Testopel  4/17/24, 1/31/24, 10/27/23, 7/26/23, 4/26/23, 1/18/23  Testosterone 297 7/22  Estrogen 28 7/24  Estrogen 118 6/23    Patient was sterilely prepped and draped, then positioned in the right side up, lateral decubitus position.  Lidocaine was used for local anesthesia.  Eleven blade was used to incise the skin, included trocars with the testopel kit were then used.  They were inserted within the incision site and we placed the pellets in a V location.  10 pellets total were inserted without difficulty.  Trocars were removed and pressure was applied for 2 min.  Steri-Strips applied for local coverage, he will return for lab assessment in 3 months.      Impression/Plan:     1. Hypogonadism- patient tolerated insertion of testopel well, call with any complaints.  Patient is also currently on anastrozole.  Otherwise see me in 3 months for labs and reinsertion.      2. Epididymo-orchitis- call with any evidence of recurrence.

## 2024-07-31 ENCOUNTER — PATIENT MESSAGE (OUTPATIENT)
Dept: PSYCHIATRY | Facility: CLINIC | Age: 39
End: 2024-07-31
Payer: COMMERCIAL

## 2024-08-20 ENCOUNTER — TELEPHONE (OUTPATIENT)
Dept: PSYCHIATRY | Facility: CLINIC | Age: 39
End: 2024-08-20
Payer: COMMERCIAL

## 2024-08-22 ENCOUNTER — OFFICE VISIT (OUTPATIENT)
Dept: PSYCHIATRY | Facility: CLINIC | Age: 39
End: 2024-08-22
Payer: COMMERCIAL

## 2024-08-22 DIAGNOSIS — F41.1 GENERALIZED ANXIETY DISORDER WITH PANIC ATTACKS: Primary | ICD-10-CM

## 2024-08-22 DIAGNOSIS — F41.0 GENERALIZED ANXIETY DISORDER WITH PANIC ATTACKS: Primary | ICD-10-CM

## 2024-08-22 PROCEDURE — 90833 PSYTX W PT W E/M 30 MIN: CPT | Mod: 95,,, | Performed by: PSYCHIATRY & NEUROLOGY

## 2024-08-22 PROCEDURE — 99214 OFFICE O/P EST MOD 30 MIN: CPT | Mod: 95,,, | Performed by: PSYCHIATRY & NEUROLOGY

## 2024-08-22 RX ORDER — ATOMOXETINE 40 MG/1
CAPSULE ORAL
Qty: 30 CAPSULE | Refills: 2 | Status: SHIPPED | OUTPATIENT
Start: 2024-08-22

## 2024-08-22 RX ORDER — CLONAZEPAM 0.5 MG/1
TABLET ORAL
Qty: 90 TABLET | Refills: 2 | Status: SHIPPED | OUTPATIENT
Start: 2024-08-22

## 2024-08-22 NOTE — PROGRESS NOTES
"Outpatient Psychiatry Follow-Up Visit (MD/NP)    8/22/2024    Clinical Status of Patient:  Outpatient (Ambulatory)    Chief Complaint:  Rahul Farrar is a 38 y.o. male who presents today for follow-up of depression and anxiety. Met with patient.      Interval History and Content of Current Session:  Interim Events/Subjective Report/Content of Current Session: Patient seen and interviewed for follow-up, last seen about three months previously.     Seeing a therapist weekly. Relationship problems - intense and recurrent jealousy that has led to contact with his wife in context of new more ambiguous work environment. She's does marketing work, social media influencing, is surrounded by young men. Recognizes that his fears are exaggerated as she hasn't behaved in ways to give him reasons that she's unreliable/unfaithful. Is participating in therapy. "Trying to reprogram my brain".   Working long shifts, finding this stressful.  Abandonment issues. No new general health problems. Fluoxetine - tolerated, but not taken. Attention problems.     Background: Pt is a 37 y/o man who presents for psychiatric follow-up, previously a pt of Johanna Boucher, no longer at [facility]. Started seeing Dr. MARADIAGA in August of '20, last saw her in April of this year. Saw Sharda Harmon in therapy from Feb. to May of this year.     Complains of problematic anxiety including intense intermittent anxiety attacks, precipitated by overworry, catastrophizing & thinking of "worst case scenarios". Intense worry focused on his infant choking. Also has intense fear of flying. Realizes anxiety is disproportionate to dangers.   Xanax has been helpful. Trying to get off over time. Sees a therapist for couples counseling. Anxiety about flying.   Buspirone didn't help  Hydroxyzine  Duloxetine  Bupropion     Remote: seroquel, escitalopram, ambien; following dad's suicide. Gained weight. Stopped meds, did ok.     Last visit: Struggling with anxiety. He is " "worried about his daughter choking and is unable to let go of the thought. White coat syndrome: bp goes up    He is really resistant about having medications.     No suicidal ideation   No homicidal ideation          9/7/2023     4:31 PM 3/30/2023     3:17 PM 9/23/2022     1:52 PM   GAD7   1. Feeling nervous, anxious, or on edge? 2 1 1   2. Not being able to stop or control worrying? 2 2 1   3. Worrying too much about different things? 2 1 1   4. Trouble relaxing? 2 1 2   5. Being so restless that it is hard to sit still? 2 2 0   6. Becoming easily annoyed or irritable? 2 2 2   7. Feeling afraid as if something awful might happen? 2 2 2   WENDIE-7 Score 14 11 9     33 yo M who was referred by Dr. Long, anxiety. Currently Xanax .5 mg twice a day. He states that he doesn't take one. She had prescribed it originally. He has tried different medications: buspirone treid it for 3 weeks then prescribed an anti-depressant: They tried different types of medications, the psychiatrist tried lexapro, seroquel, ambien,     June 27th had a baby, found out wife was pregnant October daughter born, new job, & had a lot of anxiety, hard time to get things done. Had a lot on his plate, that was stressful, and he had anxiety about being a dad. Dad has depression     Step dad committed suicide when Rahul was 23 yo, he had a DWI & mom & him were mad at him, Dad texted him. They switched medicines a lot, he was prescribed seroquel & ambien at the same time. Was on lexapro. A lot of problems with sleep, he nightmares, sleep depressed. Mom "checked out". He went to a grief counselor. He was on too much medication: later tapered off of the medications by another doctor who also provided counseling, & he felt that he developed the coping skills to deal with anxiety & grief     His symptoms of anxiety, with anxiety attacks were reactivated when he became anxious about being a father. The anxiety worsened when his wife had an emergency "  & their daughter had complications of jaundice.     Reviewed the following: GAD7 - 5.3.21 (9), 4.19.21 (11) (see notes for specific items):   After the panic attacks, he was doing a lot of things, but his energy levels were down due to feeling overwhelmed.   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    Referral to therapist for treatment of anxiety   Will begin to taper off of xanax once patient is established in therapy    Strengths and Liabilities: Strength: Patient accepts guidance/feedback, Strength: Patient is expressive/articulate., Strength: Patient is motivated for change., Liability: Patient lacks coping skills.    Treatment Goals:  Specify outcomes written in observable, behavioral terms:   Anxiety: reducing negative automatic thoughts, reducing physical symptoms of anxiety and reducing time spent worrying (<30 minutes/day)    Treatment Plan/Recommendations:   Medication Management: Continue current medications. The risks and benefits of medication were discussed with the patient.    Review of Systems   PSYCHIATRIC: Pertinant items are noted in the narrative.    Past Medical, Family and Social History: The patient's past medical, family and social history have been reviewed and  "updated as appropriate within the electronic medical record - see encounter notes.    Compliance: yes    Side effects: None    Risk Parameters:  Patient reports no suicidal ideation  Patient reports no homicidal ideation  Patient reports no self-injurious behavior  Patient reports no violent behavior    Exam (detailed: at least 9 elements; comprehensive: all 15 elements)   Constitutional  Vitals:  Most recent vital signs, dated greater than 90 days prior to this appointment, were reviewed.   Last 3 sets of Vitals        10/27/2023     8:58 AM 5/24/2024    11:49 AM 5/24/2024     2:04 PM   Vitals - 1 value per visit   SYSTOLIC 129 122 119   DIASTOLIC 85 80 79   Pulse 85 85 80   Temp  98.3 °F (36.8 °C)    SPO2  96 %    Weight (lb) 223 222 221.78   Weight (kg) 101.152 100.7 100.6   Height  5' 10" (1.778 m)    BMI (Calculated)  31.9 31.8   Pain Score Zero Zero           General:  unremarkable, age appropriate     Musculoskeletal  Muscle Strength/Tone:  not examined   Gait & Station:  non-ataxic     Psychiatric  Speech:  no latency; no press   Mood & Affect:  anxious  congruent and appropriate   Thought Process:  normal and logical   Associations:  intact   Thought Content:  suicidal thoughts: (active-no, passive-no), homicidal thoughts: (active-no, passive-no)   Insight:  has awareness of illness   Judgement: behavior is adequate to circumstances   Orientation:  grossly intact   Memory: intact for content of interview   Language: grossly intact   Attention Span & Concentration:  able to focus   Fund of Knowledge:  intact and appropriate to age and level of education     Assessment and Diagnosis   Status/Progress: Based on the examination today, the patient's problem(s) is/are adequately but not ideally controlled.  New problems have been presented today (chronic jealousy, marital problems).   social stressors  are complicating management of the primary condition.  There are no active rule-out diagnoses for this patient " at this time.     General Impression:     Dx: WENDIE    Intervention/Counseling/Treatment Plan   Discussed risks, benefits, and alternatives to treatment plan documented above with patient. I answered all patient questions related to this plan and patient expressed understanding and agreement.   Trial of strattera for moods and attention. Continue clonazepam.   Psychotherapy.  Psychotherapy today - develop insight into patterns, build motivation for more work in therapy.        Return to Clinic: 3 months    KIRTI Rhodes MD

## 2024-09-06 ENCOUNTER — PATIENT MESSAGE (OUTPATIENT)
Dept: PSYCHIATRY | Facility: CLINIC | Age: 39
End: 2024-09-06
Payer: COMMERCIAL

## 2024-09-06 RX ORDER — FLUOXETINE 10 MG/1
CAPSULE ORAL
Qty: 60 CAPSULE | Refills: 3 | Status: SHIPPED | OUTPATIENT
Start: 2024-09-06

## 2024-09-24 ENCOUNTER — TELEPHONE (OUTPATIENT)
Dept: PSYCHIATRY | Facility: CLINIC | Age: 39
End: 2024-09-24
Payer: COMMERCIAL

## 2024-09-26 ENCOUNTER — OFFICE VISIT (OUTPATIENT)
Dept: PSYCHIATRY | Facility: CLINIC | Age: 39
End: 2024-09-26
Payer: COMMERCIAL

## 2024-09-26 DIAGNOSIS — F41.1 GENERALIZED ANXIETY DISORDER WITH PANIC ATTACKS: Primary | ICD-10-CM

## 2024-09-26 DIAGNOSIS — F41.0 GENERALIZED ANXIETY DISORDER WITH PANIC ATTACKS: Primary | ICD-10-CM

## 2024-09-26 DIAGNOSIS — Z63.9 RELATIONSHIP PROBLEMS: ICD-10-CM

## 2024-09-26 PROCEDURE — 99214 OFFICE O/P EST MOD 30 MIN: CPT | Mod: 95,,, | Performed by: PSYCHIATRY & NEUROLOGY

## 2024-09-26 PROCEDURE — 90833 PSYTX W PT W E/M 30 MIN: CPT | Mod: 95,,, | Performed by: PSYCHIATRY & NEUROLOGY

## 2024-09-26 SDOH — SOCIAL DETERMINANTS OF HEALTH (SDOH): PROBLEM RELATED TO PRIMARY SUPPORT GROUP, UNSPECIFIED: Z63.9

## 2024-09-26 NOTE — PROGRESS NOTES
"Outpatient Psychiatry Follow-Up Visit (MD/NP)    9/26/2024    Clinical Status of Patient:  Outpatient (Ambulatory)    Chief Complaint:  Rahul Farrar is a 39 y.o. male who presents today for follow-up of depression and anxiety. Met with patient.      Interval History and Content of Current Session:  Interim Events/Subjective Report/Content of Current Session: Patient seen and interviewed for follow-up, last seen about one month previously. Reports having missed a couple of days of work related to mental health symptoms, stresses of work. Supervisor and others have been supportive, will work with him on adjusting expectations. He and wife are trying to work through their problems. She wants him to go to continue to go to therapy. He's going about twice / week. He has postponed a work requirement. No new health problems. No new mental health problems. Didn't tolerate strattera, but adherent with other medication. Tolerating fluoxetine.    Background: Pt is a 37 y/o man who presents for psychiatric follow-up, previously a pt of Johanna Boucher, no longer at [facility]. Started seeing Dr. MARADIAGA in August of '20, last saw her in April of this year. Saw Sharda Harmon in therapy from Feb. to May of this year.     Complains of problematic anxiety including intense intermittent anxiety attacks, precipitated by overworry, catastrophizing & thinking of "worst case scenarios". Intense worry focused on his infant choking. Also has intense fear of flying. Realizes anxiety is disproportionate to dangers.   Xanax has been helpful. Trying to get off over time. Sees a therapist for couples counseling. Anxiety about flying.   Buspirone didn't help  Hydroxyzine  Duloxetine  Bupropion     Remote: seroquel, escitalopram, ambien; following dad's suicide. Gained weight. Stopped meds, did ok.     Last visit: Struggling with anxiety. He is worried about his daughter choking and is unable to let go of the thought. White coat syndrome: bp goes " "up    He is really resistant about having medications.     No suicidal ideation   No homicidal ideation          9/26/2024     3:02 PM 8/22/2024     2:59 PM 9/7/2023     4:31 PM   GAD7   1. Feeling nervous, anxious, or on edge? 1 1 2   2. Not being able to stop or control worrying? 1 1 2   3. Worrying too much about different things? 1 3 2   4. Trouble relaxing? 1 1 2   5. Being so restless that it is hard to sit still? 1 2 2   6. Becoming easily annoyed or irritable? 1 1 2   7. Feeling afraid as if something awful might happen? 1 3 2   8. If you checked off any problems, how difficult have these problems made it for you to do your work, take care of things at home, or get along with other people? 1 1    WENDIE-7 Score 7 12 14     35 yo M who was referred by Dr. Long, anxiety. Currently Xanax .5 mg twice a day. He states that he doesn't take one. She had prescribed it originally. He has tried different medications: buspirone treid it for 3 weeks then prescribed an anti-depressant: They tried different types of medications, the psychiatrist tried lexapro, seroquel, ambien,     June 27th had a baby, found out wife was pregnant October daughter born, new job, & had a lot of anxiety, hard time to get things done. Had a lot on his plate, that was stressful, and he had anxiety about being a dad. Dad has depression     Step dad committed suicide when Rahul was 21 yo, he had a DWI & mom & him were mad at him, Dad texted him. They switched medicines a lot, he was prescribed seroquel & ambien at the same time. Was on lexapro. A lot of problems with sleep, he nightmares, sleep depressed. Mom "checked out". He went to a grief counselor. He was on too much medication: later tapered off of the medications by another doctor who also provided counseling, & he felt that he developed the coping skills to deal with anxiety & grief     His symptoms of anxiety, with anxiety attacks were reactivated when he became anxious about being a " father. The anxiety worsened when his wife had an emergency  & their daughter had complications of jaundice.     Reviewed the following: GAD7 - 5.3.21 (9), 4.19.21 (11) (see notes for specific items):   After the panic attacks, he was doing a lot of things, but his energy levels were down due to feeling overwhelmed.   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    Referral to therapist for treatment of anxiety   Will begin to taper off of xanax once patient is established in therapy    Strengths and Liabilities: Strength: Patient accepts guidance/feedback, Strength: Patient is expressive/articulate., Strength: Patient is motivated for change., Liability: Patient lacks coping skills.    Treatment Goals:  Specify outcomes written in observable, behavioral terms:   Anxiety: reducing negative automatic thoughts, reducing physical symptoms of anxiety and reducing time spent worrying (<30 minutes/day)    Treatment Plan/Recommendations:   Medication Management: Continue current medications. The risks and benefits of medication were discussed with the patient.    Review of Systems   PSYCHIATRIC: Pertinant items are noted in the narrative.    Past Medical, Family and Social History: The patient's past  "medical, family and social history have been reviewed and updated as appropriate within the electronic medical record - see encounter notes.    Compliance: yes    Side effects: None    Risk Parameters:  Patient reports no suicidal ideation  Patient reports no homicidal ideation  Patient reports no self-injurious behavior  Patient reports no violent behavior    Exam (detailed: at least 9 elements; comprehensive: all 15 elements)   Constitutional  Vitals:  Most recent vital signs, dated greater than 90 days prior to this appointment, were reviewed.   Last 3 sets of Vitals        10/27/2023     8:58 AM 5/24/2024    11:49 AM 5/24/2024     2:04 PM   Vitals - 1 value per visit   SYSTOLIC 129 122 119   DIASTOLIC 85 80 79   Pulse 85 85 80   Temp  98.3 °F (36.8 °C)    SPO2  96 %    Weight (lb) 223 222 221.78   Weight (kg) 101.152 100.7 100.6   Height  5' 10" (1.778 m)    BMI (Calculated)  31.9 31.8   Pain Score Zero Zero           General:  unremarkable, age appropriate     Musculoskeletal  Muscle Strength/Tone:  not examined   Gait & Station:  non-ataxic     Psychiatric  Speech:  no latency; no press   Mood & Affect:  anxious  congruent and appropriate   Thought Process:  normal and logical   Associations:  intact   Thought Content:  suicidal thoughts: (active-no, passive-no), homicidal thoughts: (active-no, passive-no)   Insight:  has awareness of illness   Judgement: behavior is adequate to circumstances   Orientation:  grossly intact   Memory: intact for content of interview   Language: grossly intact   Attention Span & Concentration:  able to focus   Fund of Knowledge:  intact and appropriate to age and level of education     Assessment and Diagnosis   Status/Progress: Based on the examination today, the patient's problem(s) is/are adequately but not ideally controlled.  New problems have been presented today (chronic jealousy, marital problems).   social stressors  are complicating management of the primary condition.  " There are no active rule-out diagnoses for this patient at this time.     General Impression:     Dx: WENDIE    Intervention/Counseling/Treatment Plan   Discussed risks, benefits, and alternatives to treatment plan documented above with patient. I answered all patient questions related to this plan and patient expressed understanding and agreement.   Fluoxetine for mood. Continue clonazepam.   Continue outpatient Psychotherapy.  Psychotherapy today - develop insight into patterns, build motivation for more work in therapy.   Ok for off work until October 6 for recovery.        Return to Clinic: 3 months    KIRTI Rhodes MD

## 2024-10-10 NOTE — PROGRESS NOTES
[facility]   Physical Therapy Treatment Note     Name: Rahul Farrar  Clinic Number: [id number]    Therapy Diagnosis:   Encounter Diagnosis   Name Primary?    Difficulty walking Yes     Physician: Shaista Pyle DPM    Visit Date: 5/25/2022    Physician Orders: PT Eval and Treat   Medical Diagnosis from Referral:   G57.52 (ICD-10-CM) - Tarsal tunnel syndrome of left side   M79.672 (ICD-10-CM) - Left foot pain      Evaluation Date: 5/12/2022  Authorization Period Expiration: 12/31/22  Plan of Care Expiration: 7/8/22  Visit # / Visits authorized: 2/20  FOTO: 1/3     Time In: 1:15 pm  Time Out: 2:10 pm  Total Billable Time: 55 minutes      Precautions: Standard        SUBJECTIVE     Pt reports: no change in foot pain.  He was compliant with home exercise program.  Response to previous treatment: last visit IE  Functional change: none yet    Pain: 6/10  Location: left foot    OBJECTIVE     Objective Measures updated at progress report unless specified.     Treatment     Rahul received the treatments listed below:      Rahul received therapeutic exercises to develop strength, endurance and flexibility for 45 minutes including:     Bike 5'  Toe yoga  Arch doming  Towel scrunches  Figure 4 GTB  Supine nerve glides  Toe walks/heel walks  ISO heel raise hold 35# barbell  SL heel raise with OH plate hold  SLS airex w/ KB pass arounds          Rahul received the following manual therapy techniques: dry needling were applied to the: left foot for 10 minutes, including:     IMT to quadratus plantae with stim   IMT to abductor hallucis with stim   IMT to medial gastroc with stim  IMT to tibialis posterior with stim         Patient Education and Home Exercises     Home Exercises Provided and Patient Education Provided     Education provided:   - HEP     Written Home Exercises Provided: yes.  Exercises were reviewed and Rahul was able to demonstrate them prior to the end of the session.  Rahul  demonstrated good  understanding of the education provided.      See EMR under Patient Instructions for exercises provided 5/12/2022.    ASSESSMENT     Pt tolerated first follow up visit well with no adverse effects. Able to load foot/ankle tissues with new weight bearing exercises today and mild pain in L foot area. IMT to new muscles today to address pain and dysfunction.     Rahlu Is progressing well towards his goals.   Pt prognosis is Good.     Pt will continue to benefit from skilled outpatient physical therapy to address the deficits listed in the problem list box on initial evaluation, provide pt/family education and to maximize pt's level of independence in the home and community environment.     Pt's spiritual, cultural and educational needs considered and pt agreeable to plan of care and goals.     Anticipated barriers to physical therapy: chronicity of pain    Goals:     Short Term Goals: 4 weeks   1. Patient will be independent with HEP.     Long Term Goals: 8 weeks   1. Patient will improve left ankle inversion range of motion to 20 degrees or greater.  2. Pt will improve LLE MMT to 5/5.  3. Pt will report pain at worst at 4/10 or less.  4. Pt demo improvements in FOTO score to 31% limited or less.    PLAN     Continue with current POC.    Ana Hernandez, PT        [Feeling Fatigued] : feeling fatigued [Negative] : Respiratory

## 2024-10-21 ENCOUNTER — PROCEDURE VISIT (OUTPATIENT)
Dept: UROLOGY | Facility: CLINIC | Age: 39
End: 2024-10-21
Payer: COMMERCIAL

## 2024-10-21 DIAGNOSIS — E29.1 TESTICULAR HYPOGONADISM: Primary | ICD-10-CM

## 2024-10-21 DIAGNOSIS — N45.3 EPIDIDYMO-ORCHITIS: ICD-10-CM

## 2024-10-21 PROCEDURE — 99499 UNLISTED E&M SERVICE: CPT | Mod: S$GLB,,, | Performed by: UROLOGY

## 2024-10-21 PROCEDURE — 11980 IMPLANT HORMONE PELLET(S): CPT | Mod: S$GLB,,, | Performed by: UROLOGY

## 2024-10-21 PROCEDURE — S0189 TESTOSTERONE PELLET 75 MG: HCPCS | Mod: S$GLB,,, | Performed by: UROLOGY

## 2024-10-21 RX ORDER — LEVOFLOXACIN 500 MG/1
500 TABLET, FILM COATED ORAL DAILY
Qty: 3 TABLET | Refills: 0 | Status: SHIPPED | OUTPATIENT
Start: 2024-10-21 | End: 2024-10-24

## 2024-10-21 NOTE — PROCEDURES
Procedures  Chief Complaint:   Encounter Diagnoses   Name Primary?    Testicular hypogonadism Yes    Epididymo-orchitis        HPI:   10/21/24- here for his testopel insertion.    7/20/22- LR- Patient is a 36-year-old male that is presenting with concerns of hypogonadism.  Patient was seen several years ago and total T was at the low-side of normal. Patient states that he and his wife are no longer wanting any more children and he would like to discuss possible testosterone replacement therapy.  No labs in the last 16 months.  Patient states that he has had fatigue and energy level has greatly decreased.Normal sexual function.       Allergies:  Patient has no known allergies.    Medications:  has a current medication list which includes the following prescription(s): clonazepam, multivitamin, needle (disp) 21 g, safety needles, syringe (disposable), testosterone cypionate, escitalopram oxalate, and meloxicam.    Review of Systems:  General: No fever, chills, fatigability, or weight loss.  Skin: No rashes, itching, or changes in color or texture of skin.  Chest: Denies LAKHANI, cyanosis, wheezing, cough, and sputum production.  Abdomen: Appetite fine. No weight loss. Denies diarrhea, abdominal pain, hematemesis, or blood in stool.  Musculoskeletal: No joint stiffness or swelling. Denies back pain.  : As above.  All other review of systems negative.    PMH:  Hypogonadism in males    PSH:   has a past surgical history that includes Nose surgery; Forearm fracture surgery; Tympanostomy tube placement; and Sinus surgery.    FamHx: family history includes Diabetes in his maternal grandfather; Heart disease in his maternal grandfather.    SocHx:  reports that he has never smoked. His smokeless tobacco use includes chew. He reports current alcohol use. He reports that he does not use drugs.      Physical Exam:  Vitals:    01/18/23 0942   BP: 134/85   Pulse: 87     General: A&Ox3, no apparent distress, no deformities  Neck: No  masses, normal ROM  Lungs: normal inspiration, no use of accessory muscles  Heart: normal pulse, no arrhythmias  Abdomen: Soft, NT, ND, no masses, no hernias, no hepatosplenomegaly  Skin: The skin is warm and dry. No jaundice.  Ext: No c/c/e.  : 7/23- previous vas site can be palpated, but no specific abnl.  Currently not too tender to palpation.    Labs/Studies:   Testopel  10/21/24, 4/17/24, 1/31/24, 10/27/23, 7/26/23, 4/26/23, 1/18/23  Testosterone 297 7/22  Estrogen 28 7/24  Estrogen 118 6/23    Patient was sterilely prepped and draped, then positioned in the left side up, lateral decubitus position.  Lidocaine was used for local anesthesia.  Eleven blade was used to incise the skin, included trocars with the testopel kit were then used.  They were inserted within the incision site and we placed the pellets in a V location.  10 pellets total were inserted without difficulty.  Trocars were removed and pressure was applied for 2 min.  Steri-Strips applied for local coverage, he will return for lab assessment in 3 months.      Impression/Plan:     1. Hypogonadism- patient tolerated insertion of testopel well, call with any complaints.  Patient is also currently on anastrozole.  Otherwise see me in 3 months for labs and reinsertion.      2. Epididymo-orchitis- call with any evidence of recurrence.

## 2024-10-30 ENCOUNTER — TELEPHONE (OUTPATIENT)
Dept: PSYCHIATRY | Facility: CLINIC | Age: 39
End: 2024-10-30
Payer: COMMERCIAL

## 2024-11-01 ENCOUNTER — OFFICE VISIT (OUTPATIENT)
Dept: PSYCHIATRY | Facility: CLINIC | Age: 39
End: 2024-11-01
Payer: COMMERCIAL

## 2024-11-01 DIAGNOSIS — F41.0 GENERALIZED ANXIETY DISORDER WITH PANIC ATTACKS: Primary | ICD-10-CM

## 2024-11-01 DIAGNOSIS — F41.1 GENERALIZED ANXIETY DISORDER WITH PANIC ATTACKS: Primary | ICD-10-CM

## 2024-11-01 DIAGNOSIS — Z63.9 RELATIONSHIP PROBLEMS: ICD-10-CM

## 2024-11-01 PROCEDURE — 99214 OFFICE O/P EST MOD 30 MIN: CPT | Mod: 95,,, | Performed by: PSYCHIATRY & NEUROLOGY

## 2024-11-01 RX ORDER — CLONAZEPAM 0.5 MG/1
TABLET ORAL
Qty: 90 TABLET | Refills: 1 | Status: SHIPPED | OUTPATIENT
Start: 2024-11-01

## 2024-11-01 RX ORDER — GUANFACINE 1 MG/1
TABLET ORAL
Qty: 60 TABLET | Refills: 2 | Status: SHIPPED | OUTPATIENT
Start: 2024-11-01

## 2024-11-01 RX ORDER — FLUOXETINE HYDROCHLORIDE 20 MG/1
20 CAPSULE ORAL DAILY
Qty: 30 CAPSULE | Refills: 3 | Status: SHIPPED | OUTPATIENT
Start: 2024-11-01

## 2024-11-01 SDOH — SOCIAL DETERMINANTS OF HEALTH (SDOH): PROBLEM RELATED TO PRIMARY SUPPORT GROUP, UNSPECIFIED: Z63.9

## 2024-11-01 NOTE — PROGRESS NOTES
"Outpatient Psychiatry Follow-Up Visit (MD/NP)    11/1/2024    Clinical Status of Patient:  Outpatient (Ambulatory)    Chief Complaint:  Rahul Farrar is a 39 y.o. male who presents today for follow-up of depression and anxiety. Met with patient.      Interval History and Content of Current Session:  Interim Events/Subjective Report/Content of Current Session: Patient seen and interviewed for follow-up, last seen about one month previously. Reports ongoing stressors related to relationship - Still together, she at times of argument still brings up divorce which triggers him. He's still going to therapy. Therapist is encouraging him to do couples therapy. Didn't take time off from work, concerned about resentment by co-workers, but has been able to postpone work exam. Has had no new health problems. Adherent to medication. Not taking prn medication as often.     Background: Pt is a 35 y/o man who presents for psychiatric follow-up, previously a pt of Johanna Boucher, no longer at [facility]. Started seeing Dr. MARADIAGA in August of '20, last saw her in April of this year. Saw Sharda Harmon in therapy from Feb. to May of this year.     Complains of problematic anxiety including intense intermittent anxiety attacks, precipitated by overworry, catastrophizing & thinking of "worst case scenarios". Intense worry focused on his infant choking. Also has intense fear of flying. Realizes anxiety is disproportionate to dangers.   Xanax has been helpful. Trying to get off over time. Sees a therapist for couples counseling. Anxiety about flying.   Buspirone didn't help  Hydroxyzine  Duloxetine  Bupropion     Remote: seroquel, escitalopram, ambien; following dad's suicide. Gained weight. Stopped meds, did ok.     Last visit: Struggling with anxiety. He is worried about his daughter choking and is unable to let go of the thought. White coat syndrome: bp goes up    He is really resistant about having medications.     No suicidal " "ideation   No homicidal ideation          11/1/2024     1:23 PM 9/26/2024     3:02 PM 8/22/2024     2:59 PM   GAD7   1. Feeling nervous, anxious, or on edge? 2  1  1    2. Not being able to stop or control worrying? 1  1  1    3. Worrying too much about different things? 1  1  3    4. Trouble relaxing? 1  1  1    5. Being so restless that it is hard to sit still? 1  1  2    6. Becoming easily annoyed or irritable? 1  1  1    7. Feeling afraid as if something awful might happen? 2  1  3    8. If you checked off any problems, how difficult have these problems made it for you to do your work, take care of things at home, or get along with other people? 1  1  1    WENDIE-7 Score 9  7 12       Patient-reported     35 yo M who was referred by Dr. Long, anxiety. Currently Xanax .5 mg twice a day. He states that he doesn't take one. She had prescribed it originally. He has tried different medications: buspirone treid it for 3 weeks then prescribed an anti-depressant: They tried different types of medications, the psychiatrist tried lexapro, seroquel, ambien,     June 27th had a baby, found out wife was pregnant October daughter born, new job, & had a lot of anxiety, hard time to get things done. Had a lot on his plate, that was stressful, and he had anxiety about being a dad. Dad has depression     Step dad committed suicide when Rahul was 23 yo, he had a DWI & mom & him were mad at him, Dad texted him. They switched medicines a lot, he was prescribed seroquel & ambien at the same time. Was on lexapro. A lot of problems with sleep, he nightmares, sleep depressed. Mom "checked out". He went to a grief counselor. He was on too much medication: later tapered off of the medications by another doctor who also provided counseling, & he felt that he developed the coping skills to deal with anxiety & grief     His symptoms of anxiety, with anxiety attacks were reactivated when he became anxious about being a father. The anxiety " worsened when his wife had an emergency  & their daughter had complications of jaundice.     Reviewed the following: GAD7 - 5.3.21 (9), 4.19.21 (11) (see notes for specific items):   After the panic attacks, he was doing a lot of things, but his energy levels were down due to feeling overwhelmed.   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    Referral to therapist for treatment of anxiety   Will begin to taper off of xanax once patient is established in therapy    Strengths and Liabilities: Strength: Patient accepts guidance/feedback, Strength: Patient is expressive/articulate., Strength: Patient is motivated for change., Liability: Patient lacks coping skills.    Treatment Goals:  Specify outcomes written in observable, behavioral terms:   Anxiety: reducing negative automatic thoughts, reducing physical symptoms of anxiety and reducing time spent worrying (<30 minutes/day)    Treatment Plan/Recommendations:   Medication Management: Continue current medications. The risks and benefits of medication were discussed with the patient.    Review of Systems   PSYCHIATRIC: Pertinant items are noted in the narrative.    Past Medical, Family and Social History: The patient's past medical, family and  "social history have been reviewed and updated as appropriate within the electronic medical record - see encounter notes.    Compliance: yes    Side effects: None    Risk Parameters:  Patient reports no suicidal ideation  Patient reports no homicidal ideation  Patient reports no self-injurious behavior  Patient reports no violent behavior    Exam (detailed: at least 9 elements; comprehensive: all 15 elements)   Constitutional  Vitals:  Most recent vital signs, dated greater than 90 days prior to this appointment, were reviewed.   Last 3 sets of Vitals        10/27/2023     8:58 AM 5/24/2024    11:49 AM 5/24/2024     2:04 PM   Vitals - 1 value per visit   SYSTOLIC 129 122 119   DIASTOLIC 85 80 79   Pulse 85 85 80   Temp  98.3 °F (36.8 °C)    SPO2  96 %    Weight (lb) 223 222 221.78   Weight (kg) 101.152 100.7 100.6   Height  5' 10" (1.778 m)    BMI (Calculated)  31.9 31.8   Pain Score Zero Zero           General:  unremarkable, age appropriate     Musculoskeletal  Muscle Strength/Tone:  not examined   Gait & Station:  non-ataxic     Psychiatric  Speech:  no latency; no press   Mood & Affect:  anxious  congruent and appropriate   Thought Process:  normal and logical   Associations:  intact   Thought Content:  suicidal thoughts: (active-no, passive-no), homicidal thoughts: (active-no, passive-no)   Insight:  has awareness of illness   Judgement: behavior is adequate to circumstances   Orientation:  grossly intact   Memory: intact for content of interview   Language: grossly intact   Attention Span & Concentration:  able to focus   Fund of Knowledge:  intact and appropriate to age and level of education     Assessment and Diagnosis   Status/Progress: Based on the examination today, the patient's problem(s) is/are adequately but not ideally controlled.  New problems have been presented today (chronic jealousy, marital problems).   social stressors  are complicating management of the primary condition.  There are no active " rule-out diagnoses for this patient at this time. Didn't tolerate atomoxetine.     General Impression:     Dx: WENDIE    Intervention/Counseling/Treatment Plan   Discussed risks, benefits, and alternatives to treatment plan documented above with patient. I answered all patient questions related to this plan and patient expressed understanding and agreement.   Fluoxetine for mood. Guanfacine trial for attention. Continue clonazepam.   Continue outpatient Psychotherapy.  Adhd testing as desired.       Return to Clinic: 3 months    KIRTI Rhodes MD

## 2024-11-05 ENCOUNTER — PATIENT MESSAGE (OUTPATIENT)
Dept: PSYCHIATRY | Facility: CLINIC | Age: 39
End: 2024-11-05
Payer: COMMERCIAL

## 2024-12-31 ENCOUNTER — OFFICE VISIT (OUTPATIENT)
Dept: FAMILY MEDICINE | Facility: CLINIC | Age: 39
End: 2024-12-31
Payer: COMMERCIAL

## 2024-12-31 VITALS
BODY MASS INDEX: 33.02 KG/M2 | HEART RATE: 77 BPM | OXYGEN SATURATION: 97 % | WEIGHT: 230.63 LBS | HEIGHT: 70 IN | TEMPERATURE: 97 F | SYSTOLIC BLOOD PRESSURE: 122 MMHG | DIASTOLIC BLOOD PRESSURE: 80 MMHG

## 2024-12-31 DIAGNOSIS — R10.33 PERIUMBILICAL ABDOMINAL PAIN: ICD-10-CM

## 2024-12-31 DIAGNOSIS — E29.1 TESTICULAR HYPOGONADISM: ICD-10-CM

## 2024-12-31 DIAGNOSIS — Z00.00 ANNUAL PHYSICAL EXAM: Primary | ICD-10-CM

## 2024-12-31 PROCEDURE — 99999 PR PBB SHADOW E&M-EST. PATIENT-LVL IV: CPT | Mod: PBBFAC,,, | Performed by: FAMILY MEDICINE

## 2024-12-31 RX ORDER — DICYCLOMINE HYDROCHLORIDE 10 MG/1
10 CAPSULE ORAL 3 TIMES DAILY PRN
Qty: 90 CAPSULE | Refills: 0 | Status: SHIPPED | OUTPATIENT
Start: 2024-12-31 | End: 2025-01-30

## 2024-12-31 NOTE — PROGRESS NOTES
"Subjective:       Patient ID: Rahul Farrar is a 39 y.o. male.    Chief Complaint: Abdominal Pain      HPI Comments:       Current Outpatient Medications:     anastrozole (ARIMIDEX) 1 mg Tab, Take 1 tablet (1 mg total) by mouth 3 (three) times a week., Disp: 15 tablet, Rfl: 5    clonazePAM (KLONOPIN) 0.5 MG tablet, Take 1 tablet three times daily as needed for anxiety., Disp: 90 tablet, Rfl: 1    FLUoxetine 20 MG capsule, Take 1 capsule (20 mg total) by mouth once daily., Disp: 30 capsule, Rfl: 3    guanFACINE (TENEX) 1 MG Tab, Take 1 tablet twice daily., Disp: 60 tablet, Rfl: 2    multivitamin (THERAGRAN) per tablet, Take 1 tablet by mouth., Disp: , Rfl:     benzonatate (TESSALON) 100 MG capsule, Take 200 mg by mouth 2 (two) times daily as needed., Disp: , Rfl:     dicyclomine (BENTYL) 10 MG capsule, Take 1 capsule (10 mg total) by mouth 3 (three) times daily as needed., Disp: 90 capsule, Rfl: 0    doxycycline (VIBRAMYCIN) 100 MG Cap, Take 100 mg by mouth every 12 (twelve) hours., Disp: , Rfl:       This my 1st time seeing this patient.  He is here because of abdominal symptoms.  Sounds like he has had a long history of irritable bowel type symptoms, with the workup 2 years ago including CT scan, ultrasound, HIDA scan.  All was normal.    Currently he complains of a week history of periumbilical pain.  No real relationship to eating although often times eating will stimulate a loose bowel movement.  He denies constipation.  He has been taking Pepto-Bismol and Tums.    For 2 months he has been on a "carnivore diet".  Trying to lose weight, but does not report any constipation.    Lots of stress.  Alternating daytime and nighttime schedules as a .  Marital discord the almost led to divorce in the past.  Two young daughters, who are with him today    Sees a psychiatrist.  On Klonopin up to 3 times a day for anxiety.  Was started on Prozac recently but stopped it because he did not like " "it.      Review of Systems   Constitutional:  Negative for activity change, appetite change and fever.   HENT:  Negative for sore throat.    Respiratory:  Negative for cough and shortness of breath.    Cardiovascular:  Negative for chest pain.   Gastrointestinal:  Positive for abdominal pain and diarrhea. Negative for nausea.   Genitourinary:  Negative for difficulty urinating.   Musculoskeletal:  Negative for arthralgias and myalgias.   Neurological:  Negative for dizziness and headaches.   Psychiatric/Behavioral:  The patient is nervous/anxious.        Objective:      Vitals:    12/31/24 1142   BP: 122/80   Pulse: 77   Temp: 96.7 °F (35.9 °C)   TempSrc: Tympanic   SpO2: 97%   Weight: 104.6 kg (230 lb 9.6 oz)   Height: 5' 10" (1.778 m)   PainSc: 0-No pain     Physical Exam  Vitals and nursing note reviewed.   Constitutional:       General: He is not in acute distress.     Appearance: He is well-developed. He is not diaphoretic.   HENT:      Head: Normocephalic.   Neck:      Thyroid: No thyromegaly.   Cardiovascular:      Rate and Rhythm: Normal rate and regular rhythm.      Heart sounds: Normal heart sounds. No murmur heard.  Pulmonary:      Effort: Pulmonary effort is normal.      Breath sounds: Normal breath sounds. No wheezing or rales.   Abdominal:      General: There is no distension.      Palpations: Abdomen is soft.      Tenderness: There is abdominal tenderness in the periumbilical area. There is no guarding or rebound.   Musculoskeletal:      Cervical back: Neck supple.   Lymphadenopathy:      Cervical: No cervical adenopathy.   Skin:     General: Skin is warm and dry.   Neurological:      Mental Status: He is alert and oriented to person, place, and time.   Psychiatric:         Mood and Affect: Mood normal.         Behavior: Behavior normal.         Thought Content: Thought content normal.         Judgment: Judgment normal.         Assessment:       1. Annual physical exam    2. Testicular hypogonadism  "   3. Periumbilical abdominal pain        Plan:   Annual physical exam  -     CBC Auto Differential; Future; Expected date: 12/31/2024  -     Comprehensive Metabolic Panel; Future; Expected date: 12/31/2024  -     Hemoglobin A1C; Future; Expected date: 12/31/2024  -     Hepatitis C Antibody; Future; Expected date: 12/31/2024  -     HIV 1/2 Ag/Ab (4th Gen); Future; Expected date: 12/31/2024  -     Lipid Panel; Future; Expected date: 12/31/2024    Testicular hypogonadism  Comments:  On testosterone and anastrozole via urology    Periumbilical abdominal pain  Comments:  Likely IBS.  GI consult.  X-ray today.  Lipase, CMP, CBC  Orders:  -     Ambulatory referral/consult to Gastroenterology; Future; Expected date: 01/07/2025  -     Lipase; Future; Expected date: 12/31/2024  -     X-Ray Abdomen Flat And Erect; Future; Expected date: 12/31/2024    Other orders  -     dicyclomine (BENTYL) 10 MG capsule; Take 1 capsule (10 mg total) by mouth 3 (three) times daily as needed.  Dispense: 90 capsule; Refill: 0

## 2025-01-02 ENCOUNTER — HOSPITAL ENCOUNTER (OUTPATIENT)
Dept: RADIOLOGY | Facility: HOSPITAL | Age: 40
Discharge: HOME | End: 2025-01-02
Attending: FAMILY MEDICINE
Payer: COMMERCIAL

## 2025-01-02 DIAGNOSIS — R10.33 PERIUMBILICAL ABDOMINAL PAIN: ICD-10-CM

## 2025-01-02 PROCEDURE — 74019 RADEX ABDOMEN 2 VIEWS: CPT | Mod: TC,PO

## 2025-01-02 PROCEDURE — 74019 RADEX ABDOMEN 2 VIEWS: CPT | Mod: 26,,, | Performed by: RADIOLOGY

## 2025-01-13 ENCOUNTER — PROCEDURE VISIT (OUTPATIENT)
Dept: UROLOGY | Facility: CLINIC | Age: 40
End: 2025-01-13
Payer: COMMERCIAL

## 2025-01-13 VITALS
HEIGHT: 70 IN | RESPIRATION RATE: 17 BRPM | TEMPERATURE: 98 F | BODY MASS INDEX: 32.54 KG/M2 | WEIGHT: 227.31 LBS | SYSTOLIC BLOOD PRESSURE: 120 MMHG | DIASTOLIC BLOOD PRESSURE: 76 MMHG | HEART RATE: 61 BPM

## 2025-01-13 DIAGNOSIS — N45.3 EPIDIDYMO-ORCHITIS: ICD-10-CM

## 2025-01-13 DIAGNOSIS — E29.1 TESTICULAR HYPOGONADISM: Primary | ICD-10-CM

## 2025-01-13 PROCEDURE — S0189 TESTOSTERONE PELLET 75 MG: HCPCS | Mod: S$GLB,,, | Performed by: UROLOGY

## 2025-01-13 PROCEDURE — 11980 IMPLANT HORMONE PELLET(S): CPT | Mod: S$GLB,,, | Performed by: UROLOGY

## 2025-01-13 PROCEDURE — 99499 UNLISTED E&M SERVICE: CPT | Mod: S$GLB,,, | Performed by: UROLOGY

## 2025-01-13 RX ORDER — LEVOFLOXACIN 500 MG/1
500 TABLET, FILM COATED ORAL DAILY
Qty: 3 TABLET | Refills: 0 | Status: SHIPPED | OUTPATIENT
Start: 2025-01-13 | End: 2025-01-23

## 2025-01-13 NOTE — PROCEDURES
Procedures  Chief Complaint:   Encounter Diagnoses   Name Primary?    Testicular hypogonadism Yes    Epididymo-orchitis        HPI:   1/13/25- here for his testopel insertion.    7/20/22- LR- Patient is a 36-year-old male that is presenting with concerns of hypogonadism.  Patient was seen several years ago and total T was at the low-side of normal. Patient states that he and his wife are no longer wanting any more children and he would like to discuss possible testosterone replacement therapy.  No labs in the last 16 months.  Patient states that he has had fatigue and energy level has greatly decreased.Normal sexual function.       Allergies:  Patient has no known allergies.    Medications:  has a current medication list which includes the following prescription(s): clonazepam, multivitamin, needle (disp) 21 g, safety needles, syringe (disposable), testosterone cypionate, escitalopram oxalate, and meloxicam.    Review of Systems:  General: No fever, chills, fatigability, or weight loss.  Skin: No rashes, itching, or changes in color or texture of skin.  Chest: Denies LAKHANI, cyanosis, wheezing, cough, and sputum production.  Abdomen: Appetite fine. No weight loss. Denies diarrhea, abdominal pain, hematemesis, or blood in stool.  Musculoskeletal: No joint stiffness or swelling. Denies back pain.  : As above.  All other review of systems negative.    PMH:  Hypogonadism in males    PSH:   has a past surgical history that includes Nose surgery; Forearm fracture surgery; Tympanostomy tube placement; and Sinus surgery.    FamHx: family history includes Diabetes in his maternal grandfather; Heart disease in his maternal grandfather.    SocHx:  reports that he has never smoked. His smokeless tobacco use includes chew. He reports current alcohol use. He reports that he does not use drugs.      Physical Exam:  Vitals:    01/18/23 0942   BP: 134/85   Pulse: 87     General: A&Ox3, no apparent distress, no deformities  Neck: No  masses, normal ROM  Lungs: normal inspiration, no use of accessory muscles  Heart: normal pulse, no arrhythmias  Abdomen: Soft, NT, ND, no masses, no hernias, no hepatosplenomegaly  Skin: The skin is warm and dry. No jaundice.  Ext: No c/c/e.  : 7/23- previous vas site can be palpated, but no specific abnl.  Currently not too tender to palpation.    Labs/Studies:   Testopel  1/13/25, 10/21/24, 4/17/24, 1/31/24, 10/27/23, 7/26/23, 4/26/23, 1/18/23  Testosterone 297 7/22  Estrogen 28 7/24  Estrogen 118 6/23    Patient was sterilely prepped and draped, then positioned in the right side up, lateral decubitus position.  Lidocaine was used for local anesthesia.  Eleven blade was used to incise the skin, included trocars with the testopel kit were then used.  They were inserted within the incision site and we placed the pellets in a V location.  6 pellets total were inserted without difficulty.  Trocars were removed and pressure was applied for 2 min.  Steri-Strips applied for local coverage, he will return for lab assessment in 3 months.      Impression/Plan:     1. Hypogonadism- patient tolerated insertion of testopel well, call with any complaints.  Patient is also currently on anastrozole.  Otherwise see me in 3 months for labs and reinsertion.      2. Epididymo-orchitis- call with any evidence of recurrence.

## 2025-02-05 ENCOUNTER — OFFICE VISIT (OUTPATIENT)
Dept: PSYCHIATRY | Facility: CLINIC | Age: 40
End: 2025-02-05
Payer: COMMERCIAL

## 2025-02-05 DIAGNOSIS — Z63.9 RELATIONSHIP PROBLEMS: Primary | ICD-10-CM

## 2025-02-05 DIAGNOSIS — F41.0 GENERALIZED ANXIETY DISORDER WITH PANIC ATTACKS: ICD-10-CM

## 2025-02-05 DIAGNOSIS — F41.1 GENERALIZED ANXIETY DISORDER WITH PANIC ATTACKS: ICD-10-CM

## 2025-02-05 PROCEDURE — 3044F HG A1C LEVEL LT 7.0%: CPT | Mod: CPTII,95,, | Performed by: PSYCHIATRY & NEUROLOGY

## 2025-02-05 PROCEDURE — 90833 PSYTX W PT W E/M 30 MIN: CPT | Mod: 95,,, | Performed by: PSYCHIATRY & NEUROLOGY

## 2025-02-05 PROCEDURE — 98006 SYNCH AUDIO-VIDEO EST MOD 30: CPT | Mod: 95,,, | Performed by: PSYCHIATRY & NEUROLOGY

## 2025-02-05 RX ORDER — FLUOXETINE HYDROCHLORIDE 20 MG/1
20 CAPSULE ORAL DAILY
Qty: 30 CAPSULE | Refills: 3 | Status: SHIPPED | OUTPATIENT
Start: 2025-02-05

## 2025-02-05 RX ORDER — CLONAZEPAM 0.5 MG/1
TABLET ORAL
Qty: 90 TABLET | Refills: 1 | Status: SHIPPED | OUTPATIENT
Start: 2025-02-05

## 2025-02-05 RX ORDER — TRAZODONE HYDROCHLORIDE 100 MG/1
TABLET ORAL
Qty: 30 TABLET | Refills: 2 | Status: SHIPPED | OUTPATIENT
Start: 2025-02-05

## 2025-02-05 SDOH — SOCIAL DETERMINANTS OF HEALTH (SDOH): PROBLEM RELATED TO PRIMARY SUPPORT GROUP, UNSPECIFIED: Z63.9

## 2025-02-05 NOTE — PROGRESS NOTES
"Outpatient Psychiatry Follow-Up Visit (MD/NP)    2/5/2025    Clinical Status of Patient:  Outpatient (Ambulatory)    Chief Complaint:  Rahul Farrar is a 39 y.o. male who presents today for follow-up of depression and anxiety. Met with patient.      Interval History and Content of Current Session:  Interim Events/Subjective Report/Content of Current Session: Patient seen and interviewed for follow-up, last seen about one month previously. Continues individual and couples therapy. Wife remains ambivalent about the relationship at times, has a difficult time tolerating conflict. She's slow to get past old hurts. At times doesn't want to be around him and look at him. Job stress continues - 8 on 4 off; was adherent with guanfacine - found it to be too sedating, off now. Also now off fluoxetine. Grandmother dying; Has had no new health problems. Adherent to medication. Not taking prn medication as often.     Background: Pt is a 37 y/o man who presents for psychiatric follow-up, previously a pt of Johanna Boucher, no longer at [facility]. Started seeing Dr. MARADIAGA in August of '20, last saw her in April of this year. Saw Sharda Harmon in therapy from Feb. to May of this year.     Complains of problematic anxiety including intense intermittent anxiety attacks, precipitated by overworry, catastrophizing & thinking of "worst case scenarios". Intense worry focused on his infant choking. Also has intense fear of flying. Realizes anxiety is disproportionate to dangers.   Xanax has been helpful. Trying to get off over time. Sees a therapist for couples counseling. Anxiety about flying.   Buspirone didn't help  Hydroxyzine  Duloxetine  Bupropion     Remote: seroquel, escitalopram, ambien; following dad's suicide. Gained weight. Stopped meds, did ok.     Last visit: Struggling with anxiety. He is worried about his daughter choking and is unable to let go of the thought. White coat syndrome: bp goes up    He is really resistant " "about having medications.     No suicidal ideation   No homicidal ideation          2/5/2025     9:23 AM 11/1/2024     1:23 PM 9/26/2024     3:02 PM   GAD7   1. Feeling nervous, anxious, or on edge? 2  2  1    2. Not being able to stop or control worrying? 3  1  1    3. Worrying too much about different things? 1  1  1    4. Trouble relaxing? 2  1  1    5. Being so restless that it is hard to sit still? 2  1  1    6. Becoming easily annoyed or irritable? 1  1  1    7. Feeling afraid as if something awful might happen? 3  2  1    8. If you checked off any problems, how difficult have these problems made it for you to do your work, take care of things at home, or get along with other people? 1  1  1    WENDIE-7 Score 14  9  7       Patient-reported     33 yo M who was referred by Dr. Long, anxiety. Currently Xanax .5 mg twice a day. He states that he doesn't take one. She had prescribed it originally. He has tried different medications: buspirone treid it for 3 weeks then prescribed an anti-depressant: They tried different types of medications, the psychiatrist tried lexapro, seroquel, ambien,     June 27th had a baby, found out wife was pregnant October daughter born, new job, & had a lot of anxiety, hard time to get things done. Had a lot on his plate, that was stressful, and he had anxiety about being a dad. Dad has depression     Step dad committed suicide when Rahul was 23 yo, he had a DWI & mom & him were mad at him, Dad texted him. They switched medicines a lot, he was prescribed seroquel & ambien at the same time. Was on lexapro. A lot of problems with sleep, he nightmares, sleep depressed. Mom "checked out". He went to a grief counselor. He was on too much medication: later tapered off of the medications by another doctor who also provided counseling, & he felt that he developed the coping skills to deal with anxiety & grief     His symptoms of anxiety, with anxiety attacks were reactivated when he became " anxious about being a father. The anxiety worsened when his wife had an emergency  & their daughter had complications of jaundice.     Reviewed the following: GAD7 - 5.3.21 (9), 4.19.21 (11) (see notes for specific items):   After the panic attacks, he was doing a lot of things, but his energy levels were down due to feeling overwhelmed.   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    Referral to therapist for treatment of anxiety   Will begin to taper off of xanax once patient is established in therapy    Strengths and Liabilities: Strength: Patient accepts guidance/feedback, Strength: Patient is expressive/articulate., Strength: Patient is motivated for change., Liability: Patient lacks coping skills.    Treatment Goals:  Specify outcomes written in observable, behavioral terms:   Anxiety: reducing negative automatic thoughts, reducing physical symptoms of anxiety and reducing time spent worrying (<30 minutes/day)    Treatment Plan/Recommendations:   Medication Management: Continue current medications. The risks and benefits of medication were discussed with the patient.    Review of Systems   PSYCHIATRIC: Pertinant items are noted in the narrative.    Past Medical, Family and Social History:  "The patient's past medical, family and social history have been reviewed and updated as appropriate within the electronic medical record - see encounter notes.    Compliance: yes    Side effects: None    Risk Parameters:  Patient reports no suicidal ideation  Patient reports no homicidal ideation  Patient reports no self-injurious behavior  Patient reports no violent behavior    Exam (detailed: at least 9 elements; comprehensive: all 15 elements)   Constitutional  Vitals:  Most recent vital signs, dated greater than 90 days prior to this appointment, were reviewed.   Last 3 sets of Vitals        5/24/2024     2:04 PM 12/31/2024    11:42 AM 1/13/2025     8:21 AM   Vitals - 1 value per visit   SYSTOLIC 119 122 120   DIASTOLIC 79 80 76   Pulse 80 77 61   Temp  96.7 °F (35.9 °C) 97.8 °F (36.6 °C)   Resp   17   SPO2  97 %    Weight (lb) 221.78 230.6 227.29   Weight (kg) 100.6 104.6 103.1   Height  5' 10" (1.778 m) 5' 10" (1.778 m)   BMI (Calculated) 31.8 33.1 32.6   Pain Score  Zero Zero          General:  unremarkable, age appropriate     Musculoskeletal  Muscle Strength/Tone:  not examined   Gait & Station:  non-ataxic     Psychiatric  Speech:  no latency; no press   Mood & Affect:  anxious  congruent and appropriate   Thought Process:  normal and logical   Associations:  intact   Thought Content:  suicidal thoughts: (active-no, passive-no), homicidal thoughts: (active-no, passive-no)   Insight:  has awareness of illness   Judgement: behavior is adequate to circumstances   Orientation:  grossly intact   Memory: intact for content of interview   Language: grossly intact   Attention Span & Concentration:  able to focus   Fund of Knowledge:  intact and appropriate to age and level of education     Assessment and Diagnosis   Status/Progress: Based on the examination today, the patient's problem(s) is/are adequately but not ideally controlled.  New problems have been presented today (chronic jealousy, marital problems).   " social stressors  are complicating management of the primary condition.  There are no active rule-out diagnoses for this patient at this time. Didn't tolerate atomoxetine or guanfacine     General Impression:     Dx: WENDIE    Intervention/Counseling/Treatment Plan   Discussed risks, benefits, and alternatives to treatment plan documented above with patient. I answered all patient questions related to this plan and patient expressed understanding and agreement.   Fluoxetine for mood. Continue clonazepam. Trazodone prn sleep.   Continue outpatient Psychotherapy.  Psychotherapy today.   Adhd testing as desired.       Return to Clinic: 3 months    KIRTI Rhodes MD

## 2025-03-07 ENCOUNTER — PATIENT MESSAGE (OUTPATIENT)
Dept: FAMILY MEDICINE | Facility: CLINIC | Age: 40
End: 2025-03-07
Payer: COMMERCIAL

## 2025-03-12 ENCOUNTER — OFFICE VISIT (OUTPATIENT)
Dept: DERMATOLOGY | Facility: CLINIC | Age: 40
End: 2025-03-12
Payer: COMMERCIAL

## 2025-03-12 VITALS — WEIGHT: 225 LBS | BODY MASS INDEX: 32.28 KG/M2

## 2025-03-12 DIAGNOSIS — L70.0 ACNE VULGARIS: Primary | ICD-10-CM

## 2025-03-12 DIAGNOSIS — L91.8 ACROCHORDON: ICD-10-CM

## 2025-03-12 DIAGNOSIS — L82.1 SEBORRHEIC KERATOSIS: ICD-10-CM

## 2025-03-12 DIAGNOSIS — D18.01 CHERRY ANGIOMA: ICD-10-CM

## 2025-03-12 PROCEDURE — 1159F MED LIST DOCD IN RCRD: CPT | Mod: CPTII,S$GLB,, | Performed by: DERMATOLOGY

## 2025-03-12 PROCEDURE — 99999 PR PBB SHADOW E&M-EST. PATIENT-LVL III: CPT | Mod: PBBFAC,,, | Performed by: DERMATOLOGY

## 2025-03-12 PROCEDURE — 3008F BODY MASS INDEX DOCD: CPT | Mod: CPTII,S$GLB,, | Performed by: DERMATOLOGY

## 2025-03-12 PROCEDURE — 3044F HG A1C LEVEL LT 7.0%: CPT | Mod: CPTII,S$GLB,, | Performed by: DERMATOLOGY

## 2025-03-12 PROCEDURE — 1160F RVW MEDS BY RX/DR IN RCRD: CPT | Mod: CPTII,S$GLB,, | Performed by: DERMATOLOGY

## 2025-03-12 PROCEDURE — 99203 OFFICE O/P NEW LOW 30 MIN: CPT | Mod: S$GLB,,, | Performed by: DERMATOLOGY

## 2025-03-12 RX ORDER — TRETINOIN 0.25 MG/G
CREAM TOPICAL
Qty: 20 G | Refills: 5 | Status: SHIPPED | OUTPATIENT
Start: 2025-03-12

## 2025-03-12 NOTE — PATIENT INSTRUCTIONS
CRYOSURGERY      Your doctor has used a method called cryosurgery to treat your skin condition. Cryosurgery refers to the use of very cold substances to treat a variety of skin conditions such as warts, pre-skin cancers, molluscum contagiosum, sun spots, and several benign growths. The substance we use in cryosurgery is liquid nitrogen and is so cold (-195 degrees Celsius) that it burns when administered.     Following treatment in the office, the skin may immediately burn and become red. You may find the area around the lesion is affected as well. It is sometimes necessary to treat not only the lesion, but a small area of the surrounding normal skin to achieve a good response.     A blister, and even a blood filled blister, may form after treatment.   This is a normal response. If the blister is painful, it is acceptable to sterilize a needle with rubbing alcohol and gently pop the blister. It is important that you gently wash the area with soap and warm water as the blister fluid may contain wart virus if a wart was treated. Do not remove the roof of the blister.     The area treated can take anywhere from 1-3 weeks to heal. Healing time depends on the kind of skin lesion treated, the location, and how aggressively the lesion was treated. It is recommended that the areas treated are covered with Vaseline or bacitracin ointment and a band-aid. If a band-aid is not practical, just ointment applied several times per day will do. Keeping these areas moist will speed the healing time.    Treatment with liquid nitrogen can leave a scar. In dark skin, it may be a light or dark scar, in light skin it may be a white or pink scar. These will generally fade with time, but may never go away completely.     If you have any concerns after your treatment, please feel free to call the office.       [address] [facility] [place] [address]/ [phone] FAX/ [email or web address]         RETINOIDS           Your  doctor has prescribed a topical retinoid for your skin. A retinoid is a vitamin A derived product used to treat a variety of skin conditions including acne, actinic keratoses (pre-skin cancers), uneven pigmentation from sun damage, fine lines and wrinkles, and enlarged pores.    How do they work?         Retinoids increase skin cell turn over from the normal 30 days to five or six days, minimizing clogged pores-the major factor in acne. Retinoids can also repair the DNA in cells damaged by the sun helping to even out skin pigmentation and clear pre-skin cancers. They can shrink oil glands and minimize the appearance of large pores. These effects can not be appreciated unless the medication is used on a consistent basis!    How do I use a retinoid?         After washing with a mild cleanser (Purpose, Aqua glycolic face cleanser, Cetaphil, Neutrogena deep cream cleanser), the skin should be moisturized with a non-retinol containing moisturizer such as Cerave PM. Then a thin layer of medication is applied to the forehead, nose cheeks, and chin (and around eyes if treating fine lines and wrinkles) at night. The amount of medication needed to cover the entire face should be no more than the size of a green pea. Irritation around the eyes can be treated with Vaseline at night.    What if my skin appears dry, red, and is peeling?          Retinoids do not cause dry skin but rather they cause the top layer of the skin the shed, giving an appearance of dry skin. In fact, new healthy skin cells are replacing older, damaged cells on the surface. This usually occurs the first 2-4 weeks as the skin is adjusting to the medication. It is reasonable to use the medication every other night or even every 2 nights until your skin adjusts. You can use a MILD exfoliant to remove the peeling skin (Aveeno daily clarifying pads, Aqua glycolic face cream) and can apply a moisturizer throughout the day as needed. Retinoids come in a variety  of strengths and vehicles and your doctor can find one best for you. If you cannot tolerate prescription strength retinoids, over the counter products with retinol may be beneficial. (Olay ProX wrinkle cream, ADONIS deep wrinkle cream, Green Cream at [employer])    Will my skin be more sensitive in the sun?           You will need to use sunscreen with SPF 30 daily. Retinoids will cause the outermost layer of the skin to be thinner and thus more sensitive to ultraviolet rays. However, remember that over time, retinoids actually make the skin thicker by enhancing collagen deposition which protects the skin from sun damage.    When will I see results?            If you are using a retinoid for acne, you should see improvement in 6-8 weeks. Do not be alarmed if you find that your acne gets worse before it gets better-KEEP USING THE MEDICATION- this is a normal response and your acne will improve if you can stick with it.           If you are using the medication for anti-aging and skin dyspigmentation, you may see results in 3 months, but most effects are not visible until 6 months. Retinoids are clinically proven to reverse signs of aging, but only if used on a CONSTISTENT BASIS!             Remember that retinoids should not be used if you are pregnant.           Discontinue use 1 week prior to waxing, as skin is more likely to tear.

## 2025-03-12 NOTE — PROGRESS NOTES
Subjective:      Patient ID:  Rahul Farrar is a 39 y.o. male who presents for   Chief Complaint   Patient presents with    Skin Tags    Skin Check     Previously seen in 2022 for acrochordon treated with LN2, discussed OTC retinol.    Patient complains of lesion(s)  Location: groin  Duration: year or 2  Symptoms: gets irritated; growing   Relieving factors/Previous treatments: none; would like it removed    Also would like topical retinoid for large pores, anti aging, sometimes acne bumps/blackheads. Used rx in the past tolerated well    Also notes new red spot on the chest            Review of Systems   Skin:  Negative for rash.       Objective:   Physical Exam   Constitutional: He appears well-developed and well-nourished. No distress.   Genitourinary:       Neurological: He is alert and oriented to person, place, and time. He is not disoriented.   Psychiatric: He has a normal mood and affect.   Skin:   Areas Examined (abnormalities noted in diagram):   Head / Face Inspection Performed  Genitals / Buttocks / Groin Inspection Performed                 Diagram Legend     Erythematous scaling macule/papule c/w actinic keratosis       Vascular papule c/w angioma      Pigmented verrucoid papule/plaque c/w seborrheic keratosis      Yellow umbilicated papule c/w sebaceous hyperplasia      Irregularly shaped tan macule c/w lentigo     1-2 mm smooth white papules consistent with Milia      Movable subcutaneous cyst with punctum c/w epidermal inclusion cyst      Subcutaneous movable cyst c/w pilar cyst      Firm pink to brown papule c/w dermatofibroma      Pedunculated fleshy papule(s) c/w skin tag(s)      Evenly pigmented macule c/w junctional nevus     Mildly variegated pigmented, slightly irregular-bordered macule c/w mildly atypical nevus      Flesh colored to evenly pigmented papule c/w intradermal nevus       Pink pearly papule/plaque c/w basal cell carcinoma      Erythematous hyperkeratotic cursted plaque  c/w SCC      Surgical scar with no sign of skin cancer recurrence      Open and closed comedones      Inflammatory papules and pustules      Verrucoid papule consistent consistent with wart     Erythematous eczematous patches and plaques     Dystrophic onycholytic nail with subungual debris c/w onychomycosis     Umbilicated papule    Erythematous-base heme-crusted tan verrucoid plaque consistent with inflamed seborrheic keratosis     Erythematous Silvery Scaling Plaque c/w Psoriasis     See annotation      Assessment / Plan:        Acne vulgaris  -     tretinoin (RETIN-A) 0.025 % cream; Apply a pea-sized amount to entire face qhs.  Dispense: 20 g; Refill: 5    -discussed etiology and nature of condition, management options    - topical retinoid: we reviewed that a pea sized amount of the topical retinoid is to be applied to the entire face at night and that it is not spot treatment. Patient to use every other night or 3 nights a week and gradually increase to goal of nightly use (as tolerated). Side effects reviewed to include but not limited to redness, dryness, flaking, burning/tingling sensation, skin irritation, and feeling of tightness.  Recommended discontinuing use for one week prior to waxing.    Acrochordon  Cryosurgery procedure note:    Verbal consent from the patient is obtained. Liquid nitrogen cryosurgery is applied to 1 lesions to produce a freeze injury. The patient is aware that blisters may form and is instructed on wound care with gentle cleansing and use of vaseline ointment to keep moist until healed. The patient is supplied a handout on cryosurgery and is instructed to call if lesions do not completely resolve.    Seborrheic keratosis  Cryosurgery procedure note:    Verbal consent from the patient is obtained. Liquid nitrogen cryosurgery is applied to 1 lesions to produce a freeze injury. The patient is aware that blisters may form and is instructed on wound care with gentle cleansing and use  of vaseline ointment to keep moist until healed. The patient is supplied a handout on cryosurgery and is instructed to call if lesions do not completely resolve.    Cherry angioma  This is a benign vascular lesion. Reassurance given. No treatment required.              Follow up if symptoms worsen or fail to improve.

## 2025-03-25 ENCOUNTER — OFFICE VISIT (OUTPATIENT)
Dept: DERMATOLOGY | Facility: CLINIC | Age: 40
End: 2025-03-25
Payer: COMMERCIAL

## 2025-03-25 DIAGNOSIS — D22.39 FIBROUS PAPULE OF NOSE: ICD-10-CM

## 2025-03-25 DIAGNOSIS — L81.4 LENTIGO: ICD-10-CM

## 2025-03-25 DIAGNOSIS — D48.5 NEOPLASM OF UNCERTAIN BEHAVIOR OF SKIN: Primary | ICD-10-CM

## 2025-03-25 DIAGNOSIS — D18.01 CHERRY ANGIOMA: ICD-10-CM

## 2025-03-25 DIAGNOSIS — D22.9 MULTIPLE BENIGN NEVI: ICD-10-CM

## 2025-03-25 PROCEDURE — 99213 OFFICE O/P EST LOW 20 MIN: CPT | Mod: 25,S$GLB,, | Performed by: DERMATOLOGY

## 2025-03-25 PROCEDURE — 1160F RVW MEDS BY RX/DR IN RCRD: CPT | Mod: CPTII,S$GLB,, | Performed by: DERMATOLOGY

## 2025-03-25 PROCEDURE — 99999 PR PBB SHADOW E&M-EST. PATIENT-LVL III: CPT | Mod: PBBFAC,,, | Performed by: DERMATOLOGY

## 2025-03-25 PROCEDURE — 88305 TISSUE EXAM BY PATHOLOGIST: CPT | Mod: 26,,, | Performed by: PATHOLOGY

## 2025-03-25 PROCEDURE — 88305 TISSUE EXAM BY PATHOLOGIST: CPT | Mod: TC | Performed by: DERMATOLOGY

## 2025-03-25 PROCEDURE — 3044F HG A1C LEVEL LT 7.0%: CPT | Mod: CPTII,S$GLB,, | Performed by: DERMATOLOGY

## 2025-03-25 PROCEDURE — 1159F MED LIST DOCD IN RCRD: CPT | Mod: CPTII,S$GLB,, | Performed by: DERMATOLOGY

## 2025-03-25 PROCEDURE — 11102 TANGNTL BX SKIN SINGLE LES: CPT | Mod: S$GLB,,, | Performed by: DERMATOLOGY

## 2025-03-25 NOTE — PROGRESS NOTES
Subjective:      Patient ID:  Rahul Farrar is a 39 y.o. male who presents for No chief complaint on file.    Last seen 3/12/25 for acne (tretinoin 0.025% cream), acrochordon and SK on the scrotum treated with LN2, and cherry angioma.  Here today requesting skin cancer screening.    He is concerned about moles on his back in particular as he can't see them    Denies personal h/o skin cancer  Denies fam h/o skin cancer in 1st degree relative  Grandfather had skin cancer      Patient complains of lesion(s)  Location: nose  Duration: year  Symptoms: new spot that seems to be slowly growing; no pain/bleeding/itching  Relieving factors/Previous treatments: none          Review of Systems   Skin:  Positive for wears hat. Negative for daily sunscreen use and activity-related sunscreen use.       Objective:   Physical Exam   Constitutional: He appears well-developed and well-nourished. No distress.   Neurological: He is alert and oriented to person, place, and time. He is not disoriented.   Psychiatric: He has a normal mood and affect.   Skin:   Areas Examined (abnormalities noted in diagram):   Scalp / Hair Palpated and Inspected  Head / Face Inspection Performed  Neck Inspection Performed  Chest / Axilla Inspection Performed  Abdomen Inspection Performed  Back Inspection Performed  RUE Inspected  LUE Inspection Performed  RLE Inspected  LLE Inspection Performed                 Diagram Legend     Erythematous scaling macule/papule c/w actinic keratosis       Vascular papule c/w angioma      Pigmented verrucoid papule/plaque c/w seborrheic keratosis      Yellow umbilicated papule c/w sebaceous hyperplasia      Irregularly shaped tan macule c/w lentigo     1-2 mm smooth white papules consistent with Milia      Movable subcutaneous cyst with punctum c/w epidermal inclusion cyst      Subcutaneous movable cyst c/w pilar cyst      Firm pink to brown papule c/w dermatofibroma      Pedunculated fleshy papule(s) c/w skin  tag(s)      Evenly pigmented macule c/w junctional nevus     Mildly variegated pigmented, slightly irregular-bordered macule c/w mildly atypical nevus      Flesh colored to evenly pigmented papule c/w intradermal nevus       Pink pearly papule/plaque c/w basal cell carcinoma      Erythematous hyperkeratotic cursted plaque c/w SCC      Surgical scar with no sign of skin cancer recurrence      Open and closed comedones      Inflammatory papules and pustules      Verrucoid papule consistent consistent with wart     Erythematous eczematous patches and plaques     Dystrophic onycholytic nail with subungual debris c/w onychomycosis     Umbilicated papule    Erythematous-base heme-crusted tan verrucoid plaque consistent with inflamed seborrheic keratosis     Erythematous Silvery Scaling Plaque c/w Psoriasis     See annotation            Assessment / Plan:      Pathology Orders:       Normal Orders This Visit    Specimen to Pathology, Dermatology     Questions:    Procedure Type: Dermatology and skin neoplasms    Number of Specimens: 1    ------------------------: -------------------------    Spec 1 Procedure: Shave Biopsy    Spec 1 Clinical Impression: nevus vs lentigo r/o atypia    Spec 1 Source: L temporal scalp    Clinical Information:     Clinical History:     Specimen Source: Scalp    Release to patient:           Neoplasm of uncertain behavior of skin  -     Specimen to Pathology, Dermatology    Shave biopsy procedure note:    Shave biopsy performed after verbal consent including risk of infection, scar, recurrence, need for additional treatment of site. Area prepped with alcohol, anesthetized with approximately 1.0cc of 1% lidocaine with epinephrine. Lesional tissue shaved with razor blade. Hemostasis achieved with application of aluminum chloride followed by hyfrecation. No complications. Dressing applied. Wound care explained.      Multiple benign nevi  Total body skin examination performed today including at  least 10 points (declined groin and feet exam) as noted in physical examination.   Reassurance provided.  Instructed patient to observe lesion(s) for changes and follow up in clinic if changes are noted. Discussed ABCDE's of moles and brochure provided.    Patient instructed in importance in daily sun protection of at least spf 30. Mineral sunscreen ingredients preferred (Zinc +/- Titanium) such as EltaMD UV Clear,  Cetaphil Sheer Mineral Sunscreen Face Liquid, or La Roche Posay mineral sunscreen line.  Patient encouraged to wear hat for all outdoor exposure.   Also discussed sun avoidance and use of protective clothing.    Lentigo  This is a benign hyperpigmented sun induced lesion. Daily sun protection will reduce the number of new lesions. Treatment of these benign lesions are considered cosmetic.    Cherry angioma  This is a benign vascular lesion. Reassurance given. No treatment required.     Fibrous papule of nose  Vs seb hyperplasia  Reassurance given to patient. No treatment is necessary.   Treatment of benign, asymptomatic lesions may be considered cosmetic.           F/u pending path result        LOS NUMBER AND COMPLEXITY OF PROBLEMS    COMPLEXITY OF DATA RISK TOTAL TIME (m)   00610  63765 [] 1 self-limited or minor problem [x] Minimal to none [] No treatment recommended or patient to monitor. Reassurance.  15-29  10-19   80713  75726 Low  [x] 2 or more self limited or minor problems  [] 1 stable chronic illness  [] 1 acute, uncomplicated illness or injury Limited (2)  [] Prior external notes from each unique source  [] Review result of each unique test  [] Order each unique test  OR [] Independent historian Low  [x]  OTC medications   []  Discussed/Decision for minor skin surgery (no risk factors) 30-44  20-29   32127  15675 Moderate  []  1 or more chronic unstable illness (not at goal or progression or exacerbation) or SE of treatment  []  2 or more stable chronic illnesses  []  1 acute illness with  systemic symptoms  []  1 acute complicated injury  []  1 undiagnosed new problem with uncertain prognosis Moderate (1/3 below)  []  3 or more data items        *Now includes independent historian  []  Independent interpretation of a test  []  Discuss management/test with another provider Moderate  []  Prescription drug mgmt  []  Discussed/Decision for Minor surgery with risk factors  []  Mgmt limited by social determinates 45-59  30-39   56102  38017 High  []  1 or more chronic illness with severe exacerbation, progression or SE of treatment  []  1 acute or chronic illness/injury that poses a threat to life or bodily function Extensive (2/3 below)  []  3 or more data items        *Now includes independent historian.  []  Independent interpretation of a test  []  Discuss management/test with another provider High  []  Major surgery with risk discussed  []  Drug therapy requiring intensive monitoring for toxicity  []  Hospitalization  []  Decision for DNR 60-74  40-54

## 2025-03-25 NOTE — PATIENT INSTRUCTIONS
Shave Biopsy Wound Care    Your doctor has performed a shave biopsy today.  A band aid and vaseline ointment has been placed over the site.  This should remain in place for 24 hours.  It is recommended that you keep the area dry for the first 24 hours.  After 24 hours, you may remove the band aid and wash the area with warm soap and water and apply Vaseline jelly.  Many patients prefer to use Neosporin or Bacitracin ointment.  This is acceptable; however, know that you can develop an allergy to this medication even if you have used it safely for years.  It is important to keep the area moist.  Letting it dry out and get air slows healing time, and will worsen the scar.  Band aid is optional after first 24 hours.      If you notice increasing redness, tenderness, pain, or yellow drainage at the biopsy site, please notify your doctor.  These are signs of an infection.    If your biopsy site is bleeding, apply firm pressure for 15 minutes straight.  Repeat for another 15 minutes, if it is still bleeding.   If the surgical site continues to bleed, then please contact your doctor.      [facility] [place] [address]/ [phone] FAX/ [email or web address]

## 2025-03-27 LAB
ESTROGEN SERPL-MCNC: NORMAL PG/ML
INSULIN SERPL-ACNC: NORMAL U[IU]/ML
LAB AP GROSS DESCRIPTION: NORMAL
LAB AP REPORT FOOTNOTES: NORMAL
T3RU NFR SERPL: NORMAL %

## 2025-03-31 ENCOUNTER — RESULTS FOLLOW-UP (OUTPATIENT)
Dept: DERMATOLOGY | Facility: CLINIC | Age: 40
End: 2025-03-31

## 2025-03-31 NOTE — PROGRESS NOTES
Informed patient via portal of biopsy showing benign mole. No further intervention needed for this site.       Final Diagnosis   Date Value Ref Range Status   03/25/2025   Final     1. Skin, left temporal scalp, shave biopsy:   - MELANOCYTIC NEVUS, COMPOUND TYPE.    This lesion is benign.

## 2025-04-10 ENCOUNTER — PATIENT MESSAGE (OUTPATIENT)
Dept: PSYCHIATRY | Facility: CLINIC | Age: 40
End: 2025-04-10
Payer: COMMERCIAL

## 2025-04-11 RX ORDER — QUETIAPINE FUMARATE 200 MG/1
TABLET, FILM COATED ORAL
Qty: 30 TABLET | Refills: 2 | Status: SHIPPED | OUTPATIENT
Start: 2025-04-11 | End: 2025-04-14

## 2025-04-14 ENCOUNTER — PATIENT MESSAGE (OUTPATIENT)
Dept: PSYCHIATRY | Facility: CLINIC | Age: 40
End: 2025-04-14
Payer: COMMERCIAL

## 2025-04-14 ENCOUNTER — LAB VISIT (OUTPATIENT)
Dept: LAB | Facility: HOSPITAL | Age: 40
End: 2025-04-14
Attending: UROLOGY
Payer: COMMERCIAL

## 2025-04-14 DIAGNOSIS — G47.00 INSOMNIA, UNSPECIFIED TYPE: Primary | ICD-10-CM

## 2025-04-14 DIAGNOSIS — E29.1 TESTICULAR HYPOGONADISM: ICD-10-CM

## 2025-04-14 LAB
ABSOLUTE EOSINOPHIL (OHS): 0.06 K/UL
ABSOLUTE MONOCYTE (OHS): 0.5 K/UL (ref 0.3–1)
ABSOLUTE NEUTROPHIL COUNT (OHS): 4.13 K/UL (ref 1.8–7.7)
ALBUMIN SERPL BCP-MCNC: 4.3 G/DL (ref 3.5–5.2)
ALP SERPL-CCNC: 64 UNIT/L (ref 40–150)
ALT SERPL W/O P-5'-P-CCNC: 27 UNIT/L (ref 10–44)
AST SERPL-CCNC: 20 UNIT/L (ref 11–45)
BASOPHILS # BLD AUTO: 0.05 K/UL
BASOPHILS NFR BLD AUTO: 0.7 %
BILIRUB DIRECT SERPL-MCNC: 0.6 MG/DL (ref 0.1–0.3)
BILIRUB SERPL-MCNC: 2.1 MG/DL (ref 0.1–1)
ERYTHROCYTE [DISTWIDTH] IN BLOOD BY AUTOMATED COUNT: 12 % (ref 11.5–14.5)
HCT VFR BLD AUTO: 57.7 % (ref 40–54)
HGB BLD-MCNC: 19.6 GM/DL (ref 14–18)
IMM GRANULOCYTES # BLD AUTO: 0.02 K/UL (ref 0–0.04)
IMM GRANULOCYTES NFR BLD AUTO: 0.3 % (ref 0–0.5)
LYMPHOCYTES # BLD AUTO: 2.12 K/UL (ref 1–4.8)
MCH RBC QN AUTO: 30.7 PG (ref 27–31)
MCHC RBC AUTO-ENTMCNC: 34 G/DL (ref 32–36)
MCV RBC AUTO: 90 FL (ref 82–98)
NUCLEATED RBC (/100WBC) (OHS): 0 /100 WBC
PLATELET # BLD AUTO: 253 K/UL (ref 150–450)
PMV BLD AUTO: 11.6 FL (ref 9.2–12.9)
PROT SERPL-MCNC: 7.6 GM/DL (ref 6–8.4)
RBC # BLD AUTO: 6.39 M/UL (ref 4.6–6.2)
RELATIVE EOSINOPHIL (OHS): 0.9 %
RELATIVE LYMPHOCYTE (OHS): 30.8 % (ref 18–48)
RELATIVE MONOCYTE (OHS): 7.3 % (ref 4–15)
RELATIVE NEUTROPHIL (OHS): 60 % (ref 38–73)
TESTOST SERPL-MCNC: 381 NG/DL (ref 304–1227)
WBC # BLD AUTO: 6.88 K/UL (ref 3.9–12.7)

## 2025-04-14 PROCEDURE — 36415 COLL VENOUS BLD VENIPUNCTURE: CPT

## 2025-04-14 PROCEDURE — 80076 HEPATIC FUNCTION PANEL: CPT

## 2025-04-14 PROCEDURE — 85025 COMPLETE CBC W/AUTO DIFF WBC: CPT

## 2025-04-14 PROCEDURE — 84403 ASSAY OF TOTAL TESTOSTERONE: CPT

## 2025-04-14 RX ORDER — QUETIAPINE FUMARATE 100 MG/1
TABLET, FILM COATED ORAL
Qty: 60 TABLET | Refills: 2 | Status: SHIPPED | OUTPATIENT
Start: 2025-04-14

## 2025-04-15 ENCOUNTER — OFFICE VISIT (OUTPATIENT)
Dept: FAMILY MEDICINE | Facility: CLINIC | Age: 40
End: 2025-04-15
Payer: COMMERCIAL

## 2025-04-15 VITALS
TEMPERATURE: 98 F | SYSTOLIC BLOOD PRESSURE: 120 MMHG | RESPIRATION RATE: 16 BRPM | WEIGHT: 228.31 LBS | HEIGHT: 70 IN | BODY MASS INDEX: 32.69 KG/M2 | HEART RATE: 87 BPM | DIASTOLIC BLOOD PRESSURE: 80 MMHG | OXYGEN SATURATION: 98 %

## 2025-04-15 DIAGNOSIS — J30.9 ALLERGIC RHINITIS, UNSPECIFIED SEASONALITY, UNSPECIFIED TRIGGER: Primary | ICD-10-CM

## 2025-04-15 PROCEDURE — 1159F MED LIST DOCD IN RCRD: CPT | Mod: CPTII,S$GLB,, | Performed by: NURSE PRACTITIONER

## 2025-04-15 PROCEDURE — 3044F HG A1C LEVEL LT 7.0%: CPT | Mod: CPTII,S$GLB,, | Performed by: NURSE PRACTITIONER

## 2025-04-15 PROCEDURE — 99999 PR PBB SHADOW E&M-EST. PATIENT-LVL IV: CPT | Mod: PBBFAC,,, | Performed by: NURSE PRACTITIONER

## 2025-04-15 PROCEDURE — 3079F DIAST BP 80-89 MM HG: CPT | Mod: CPTII,S$GLB,, | Performed by: NURSE PRACTITIONER

## 2025-04-15 PROCEDURE — 3074F SYST BP LT 130 MM HG: CPT | Mod: CPTII,S$GLB,, | Performed by: NURSE PRACTITIONER

## 2025-04-15 PROCEDURE — 3008F BODY MASS INDEX DOCD: CPT | Mod: CPTII,S$GLB,, | Performed by: NURSE PRACTITIONER

## 2025-04-15 PROCEDURE — 1160F RVW MEDS BY RX/DR IN RCRD: CPT | Mod: CPTII,S$GLB,, | Performed by: NURSE PRACTITIONER

## 2025-04-15 PROCEDURE — 99213 OFFICE O/P EST LOW 20 MIN: CPT | Mod: S$GLB,,, | Performed by: NURSE PRACTITIONER

## 2025-04-15 RX ORDER — FLUTICASONE PROPIONATE 50 MCG
2 SPRAY, SUSPENSION (ML) NASAL DAILY
Qty: 16 G | Refills: 3 | Status: SHIPPED | OUTPATIENT
Start: 2025-04-15

## 2025-04-15 RX ORDER — LEVOCETIRIZINE DIHYDROCHLORIDE 5 MG/1
5 TABLET, FILM COATED ORAL NIGHTLY
Qty: 90 TABLET | Refills: 3 | Status: SHIPPED | OUTPATIENT
Start: 2025-04-15 | End: 2026-04-15

## 2025-04-15 NOTE — LETTER
April 15, 2025      [facility]  [address]  [facility] [address]  Phone: [phone]  Fax: [phone]       Patient: Rahul Farrar   YOB: 1985  Date of Visit: 04/15/2025    To Whom It May Concern:    Fanny Farrar  was at [facility] on 04/15/2025. The patient may return to work/school on 4/16/2025 with no restrictions. Please excuse the patient on 4/9-12/2025. If you have any questions or concerns, or if I can be of further assistance, please do not hesitate to contact me.    Sincerely,    Shakila Miller MA

## 2025-04-15 NOTE — PROGRESS NOTES
Subjective:       Patient ID: Rahul Farrar is a 39 y.o. male.    Chief Complaint: Sinusitis    History of Present Illness    Patient presents today for ongoing respiratory symptoms and work clearance He reports a 2-week history of bronchitis followed by recurrent sinus infection. He has been self-medicating with previously prescribed antibiotics, antihistamines, and steroids from prior episodes. He continues to experience persistent cough and throat dryness. He has been absent from work for 4 days since Friday. He reports immediate sensitivity to feather-containing items in the environment, including feather bedding.      ROS:  General: -fever, -chills, -fatigue, -weight gain, -weight loss  Eyes: -vision changes, -redness, -discharge  ENT: -ear pain, +nasal congestion, +sore throat, +post nasal drip, +sinus pressure  Cardiovascular: -chest pain, -palpitations, -lower extremity edema  Respiratory: +cough, -shortness of breath  Gastrointestinal: -abdominal pain, -nausea, -vomiting, -diarrhea, -constipation, -blood in stool  Genitourinary: -dysuria, -hematuria, -frequency  Musculoskeletal: -joint pain, -muscle pain  Skin: -rash, -lesion  Neurological: -headache, -dizziness, -numbness, -tingling  Psychiatric: -anxiety, -depression, -sleep difficulty  Allergic: +allergic reactions        Past Medical History:   Diagnosis Date    Anxiety     Facial trauma     Keloid cicatrix     Low testosterone     Seasonal allergies       Medications Ordered Prior to Encounter[1]       Objective:      Physical Exam    General: In no acute distress.  Head: Normocephalic. Non traumatic.  Eyes: PERRLA. EOMs full. Conjunctivae clear. Fundi grossly normal.  Ears: EACs clear. TMs normal.  Nose: Mucosa pink. Mucosa moist. No obstruction.  Throat: Clear. No exudates. No lesions. COBBLESTONING IN THROAT.  Neck: Supple. No masses. No thyromegaly. No bruits.  Chest: Lungs clear. No rales. No rhonchi. No wheezes.  Heart: RRR. No murmurs. No  rubs. No gallops.  Abdomen: Soft. No tenderness. No masses. BS normal.  : Normal external genitalia. No lesions. No discharge. No hernias  noted.  Back: Normal curvature. No scoliosis. No tenderness.  Extremities: Warm. Well perfused. No upper extremity edema. No lower extremity edema. FROM. No deformities. No joint erythema.  Neuro: No focal deficits appreciated. Good muscle tone. Normal response to visual stimuli. Normal response to auditory stimuli.  Skin: Normal. No rashes. No lesions noted.          Assessment/Plan:     1. Allergic rhinitis, unspecified seasonality, unspecified trigger       Assessment & Plan    ACUTE SINUSITIS:  - flonase, and xyzal; work excuse given              No follow-ups on file.    This note was generated with the assistance of ambient listening technology. Verbal consent was obtained by the patient and accompanying visitor(s) for the recording of patient appointment to facilitate this note. I attest to having reviewed and edited the generated note for accuracy, though some syntax or spelling errors may persist. Please contact the author of this note for any clarification.            [1]   Current Outpatient Medications on File Prior to Visit   Medication Sig Dispense Refill    anastrozole (ARIMIDEX) 1 mg Tab Take 1 tablet (1 mg total) by mouth 3 (three) times a week. 15 tablet 5    clonazePAM (KLONOPIN) 0.5 MG tablet Take 1 tablet three times daily as needed for anxiety. 90 tablet 1    FLUoxetine 20 MG capsule Take 1 capsule (20 mg total) by mouth once daily. 30 capsule 3    multivitamin (THERAGRAN) per tablet Take 1 tablet by mouth.      QUEtiapine (SEROQUEL) 100 MG Tab Take 1/2 to 1 tablet at bedtime. 60 tablet 2    tretinoin (RETIN-A) 0.025 % cream Apply a pea-sized amount to entire face qhs. 20 g 5    benzonatate (TESSALON) 100 MG capsule Take 200 mg by mouth 2 (two) times daily as needed.      guanFACINE (TENEX) 1 MG Tab Take 1 tablet twice daily. 60 tablet 2     No current  facility-administered medications on file prior to visit.

## 2025-04-17 ENCOUNTER — PROCEDURE VISIT (OUTPATIENT)
Dept: UROLOGY | Facility: CLINIC | Age: 40
End: 2025-04-17
Payer: COMMERCIAL

## 2025-04-17 DIAGNOSIS — N45.3 EPIDIDYMO-ORCHITIS: ICD-10-CM

## 2025-04-17 DIAGNOSIS — E29.1 TESTICULAR HYPOGONADISM: Primary | ICD-10-CM

## 2025-04-17 RX ORDER — LEVOFLOXACIN 500 MG/1
500 TABLET, FILM COATED ORAL DAILY
Qty: 3 TABLET | Refills: 0 | Status: SHIPPED | OUTPATIENT
Start: 2025-04-17 | End: 2025-04-27

## 2025-04-17 NOTE — PROCEDURES
Procedures  Chief Complaint:   Encounter Diagnoses   Name Primary?    Testicular hypogonadism Yes    Epididymo-orchitis        HPI:   4/17/25- here for his testopel insertion.    7/20/22- LR- Patient is a 36-year-old male that is presenting with concerns of hypogonadism.  Patient was seen several years ago and total T was at the low-side of normal. Patient states that he and his wife are no longer wanting any more children and he would like to discuss possible testosterone replacement therapy.  No labs in the last 16 months.  Patient states that he has had fatigue and energy level has greatly decreased.Normal sexual function.       Allergies:  Patient has no known allergies.    Medications:  has a current medication list which includes the following prescription(s): clonazepam, multivitamin, needle (disp) 21 g, safety needles, syringe (disposable), testosterone cypionate, escitalopram oxalate, and meloxicam.    Review of Systems:  General: No fever, chills, fatigability, or weight loss.  Skin: No rashes, itching, or changes in color or texture of skin.  Chest: Denies LAKHANI, cyanosis, wheezing, cough, and sputum production.  Abdomen: Appetite fine. No weight loss. Denies diarrhea, abdominal pain, hematemesis, or blood in stool.  Musculoskeletal: No joint stiffness or swelling. Denies back pain.  : As above.  All other review of systems negative.    PMH:  Hypogonadism in males    PSH:   has a past surgical history that includes Nose surgery; Forearm fracture surgery; Tympanostomy tube placement; and Sinus surgery.    FamHx: family history includes Diabetes in his maternal grandfather; Heart disease in his maternal grandfather.    SocHx:  reports that he has never smoked. His smokeless tobacco use includes chew. He reports current alcohol use. He reports that he does not use drugs.      Physical Exam:  Vitals:    01/18/23 0942   BP: 134/85   Pulse: 87     General: A&Ox3, no apparent distress, no deformities  Neck: No  masses, normal ROM  Lungs: normal inspiration, no use of accessory muscles  Heart: normal pulse, no arrhythmias  Abdomen: Soft, NT, ND, no masses, no hernias, no hepatosplenomegaly  Skin: The skin is warm and dry. No jaundice.  Ext: No c/c/e.  : 7/23- previous vas site can be palpated, but no specific abnl.  Currently not too tender to palpation.    Labs/Studies:   Testopel  4/17/25, 1/13/25, 10/21/24, 4/17/24, 1/31/24, 10/27/23, 7/26/23, 4/26/23, 1/18/23  Testosterone 297 7/22  Estrogen 28 7/24  Estrogen 118 6/23    Patient was sterilely prepped and draped, then positioned in the left side up, lateral decubitus position.  Lidocaine was used for local anesthesia.  Eleven blade was used to incise the skin, included trocars with the testopel kit were then used.  They were inserted within the incision site and we placed the pellets in a V location.  6 pellets total were inserted without difficulty.  Trocars were removed and pressure was applied for 2 min.  Steri-Strips applied for local coverage, he will return for lab assessment in 3 months.      Impression/Plan:     1. Hypogonadism- patient tolerated insertion of testopel well, call with any complaints.  Patient is also currently on anastrozole.  Otherwise see me in 3 months for labs and reinsertion.      2. Epididymo-orchitis- call with any evidence of recurrence.

## 2025-04-29 ENCOUNTER — OFFICE VISIT (OUTPATIENT)
Dept: PSYCHIATRY | Facility: CLINIC | Age: 40
End: 2025-04-29
Payer: COMMERCIAL

## 2025-04-29 DIAGNOSIS — F41.1 GENERALIZED ANXIETY DISORDER WITH PANIC ATTACKS: Primary | ICD-10-CM

## 2025-04-29 DIAGNOSIS — G47.00 INSOMNIA, UNSPECIFIED TYPE: ICD-10-CM

## 2025-04-29 DIAGNOSIS — F41.0 GENERALIZED ANXIETY DISORDER WITH PANIC ATTACKS: Primary | ICD-10-CM

## 2025-04-29 DIAGNOSIS — Z63.9 RELATIONSHIP PROBLEMS: ICD-10-CM

## 2025-04-29 PROCEDURE — 98006 SYNCH AUDIO-VIDEO EST MOD 30: CPT | Mod: 95,,, | Performed by: PSYCHIATRY & NEUROLOGY

## 2025-04-29 PROCEDURE — 90833 PSYTX W PT W E/M 30 MIN: CPT | Mod: 95,,, | Performed by: PSYCHIATRY & NEUROLOGY

## 2025-04-29 PROCEDURE — 3044F HG A1C LEVEL LT 7.0%: CPT | Mod: CPTII,95,, | Performed by: PSYCHIATRY & NEUROLOGY

## 2025-04-29 SDOH — SOCIAL DETERMINANTS OF HEALTH (SDOH): PROBLEM RELATED TO PRIMARY SUPPORT GROUP, UNSPECIFIED: Z63.9

## 2025-04-29 NOTE — PROGRESS NOTES
"Outpatient Psychiatry Follow-Up Visit (MD/NP)    4/29/2025    Clinical Status of Patient:  Outpatient (Ambulatory)    Chief Complaint:  Rahul Farrar is a 39 y.o. male who presents today for follow-up of depression and anxiety. Met with patient.      Interval History and Content of Current Session:  Interim Events/Subjective Report/Content of Current Session: Patient seen and interviewed for follow-up, last seen about one month previously. Has been participating in IOP for past several days. After period in which wife said she was  from him, relationship is together and seemingly improving. Has been reconnecting with some parts of himself he'd left behind. Off from work.   no new health problems. Adherent to medication. Not taking prn medication as often.     Background: Pt is a 37 y/o man who presents for psychiatric follow-up, previously a pt of Johanna Boucher, no longer at [facility]. Started seeing Dr. MARADIAGA in August of '20, last saw her in April of this year. Saw Sharda Harmon in therapy from Feb. to May of this year.     Complains of problematic anxiety including intense intermittent anxiety attacks, precipitated by overworry, catastrophizing & thinking of "worst case scenarios". Intense worry focused on his infant choking. Also has intense fear of flying. Realizes anxiety is disproportionate to dangers.   Xanax has been helpful. Trying to get off over time. Sees a therapist for couples counseling. Anxiety about flying.   Buspirone didn't help  Hydroxyzine  Duloxetine  Bupropion     Remote: seroquel, escitalopram, ambien; following dad's suicide. Gained weight. Stopped meds, did ok.     Last visit: Struggling with anxiety. He is worried about his daughter choking and is unable to let go of the thought. White coat syndrome: bp goes up    He is really resistant about having medications.     No suicidal ideation   No homicidal ideation          4/29/2025     3:31 PM 2/5/2025     9:23 AM 11/1/2024     " "1:23 PM   GAD7   1. Feeling nervous, anxious, or on edge? 2 2 2   2. Not being able to stop or control worrying? 2 3 1   3. Worrying too much about different things? 2 1 1   4. Trouble relaxing? 2 2 1   5. Being so restless that it is hard to sit still? 2 2 1   6. Becoming easily annoyed or irritable? 2 1 1   7. Feeling afraid as if something awful might happen? 2 3 2   8. If you checked off any problems, how difficult have these problems made it for you to do your work, take care of things at home, or get along with other people? 2 1 1   WENDIE-7 Score 14  14  9        Patient-reported     33 yo M who was referred by Dr. Long, anxiety. Currently Xanax .5 mg twice a day. He states that he doesn't take one. She had prescribed it originally. He has tried different medications: buspirone treid it for 3 weeks then prescribed an anti-depressant: They tried different types of medications, the psychiatrist tried lexapro, seroquel, ambien,      had a baby, found out wife was pregnant October daughter born, new job, & had a lot of anxiety, hard time to get things done. Had a lot on his plate, that was stressful, and he had anxiety about being a dad. Dad has depression     Step dad committed suicide when Rahul was 23 yo, he had a DWI & mom & him were mad at him, Dad texted him. They switched medicines a lot, he was prescribed seroquel & ambien at the same time. Was on lexapro. A lot of problems with sleep, he nightmares, sleep depressed. Mom "checked out". He went to a grief counselor. He was on too much medication: later tapered off of the medications by another doctor who also provided counseling, & he felt that he developed the coping skills to deal with anxiety & grief     His symptoms of anxiety, with anxiety attacks were reactivated when he became anxious about being a father. The anxiety worsened when his wife had an emergency  & their daughter had complications of jaundice.     Reviewed the " following: GAD7 - 5.3.21 (9), 4.19.21 (11) (see notes for specific items):   After the panic attacks, he was doing a lot of things, but his energy levels were down due to feeling overwhelmed.   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    Referral to therapist for treatment of anxiety   Will begin to taper off of xanax once patient is established in therapy    Strengths and Liabilities: Strength: Patient accepts guidance/feedback, Strength: Patient is expressive/articulate., Strength: Patient is motivated for change., Liability: Patient lacks coping skills.    Treatment Goals:  Specify outcomes written in observable, behavioral terms:   Anxiety: reducing negative automatic thoughts, reducing physical symptoms of anxiety and reducing time spent worrying (<30 minutes/day)    Treatment Plan/Recommendations:   Medication Management: Continue current medications. The risks and benefits of medication were discussed with the patient.    Review of Systems   PSYCHIATRIC: Pertinant items are noted in the narrative.    Past Medical, Family and Social History: The patient's past medical, family and social history have been reviewed and updated as appropriate within the electronic medical record - see encounter  "notes.    Compliance: yes    Side effects: None    Risk Parameters:  Patient reports no suicidal ideation  Patient reports no homicidal ideation  Patient reports no self-injurious behavior  Patient reports no violent behavior    Exam (detailed: at least 9 elements; comprehensive: all 15 elements)   Constitutional  Vitals:  Most recent vital signs, dated greater than 90 days prior to this appointment, were reviewed.   Last 3 sets of Vitals        3/12/2025    11:21 AM 3/25/2025    10:14 AM 4/15/2025    11:45 AM   Vitals - 1 value per visit   SYSTOLIC   120   DIASTOLIC   80   Pulse   87   Temp   98.1 °F (36.7 °C)   Resp   16   SPO2   98 %   Weight (lb) 225  228.29   Weight (kg) 102.059  103.55   Height   5' 10" (1.778 m)   BMI (Calculated)   32.8   Pain Score Zero Zero Zero          General:  unremarkable, age appropriate     Musculoskeletal  Muscle Strength/Tone:  not examined   Gait & Station:  non-ataxic     Psychiatric  Speech:  no latency; no press   Mood & Affect:  anxious  congruent and appropriate   Thought Process:  normal and logical   Associations:  intact   Thought Content:  suicidal thoughts: (active-no, passive-no), homicidal thoughts: (active-no, passive-no)   Insight:  has awareness of illness   Judgement: behavior is adequate to circumstances   Orientation:  grossly intact   Memory: intact for content of interview   Language: grossly intact   Attention Span & Concentration:  able to focus   Fund of Knowledge:  intact and appropriate to age and level of education     Assessment and Diagnosis   Status/Progress: Based on the examination today, the patient's problem(s) is/are adequately but not ideally controlled.  New problems have been presented today (chronic jealousy, marital problems).  social stressors are complicating management of the primary condition.  There are no active rule-out diagnoses for this patient at this time. Didn't tolerate atomoxetine or guanfacine     General Impression:     Dx: " WENDIE    Intervention/Counseling/Treatment Plan   Discussed risks, benefits, and alternatives to treatment plan documented above with patient. I answered all patient questions related to this plan and patient expressed understanding and agreement.   Continue IOP.   Continue meds.  Continue outpatient Psychotherapy when finishes IOP.  Psychotherapy today.   Adhd testing as desired.       Return to Clinic: 3 months    KIRTI Rhodes MD

## 2025-04-30 ENCOUNTER — PATIENT MESSAGE (OUTPATIENT)
Dept: PSYCHIATRY | Facility: CLINIC | Age: 40
End: 2025-04-30
Payer: COMMERCIAL

## 2025-05-15 DIAGNOSIS — F41.0 GENERALIZED ANXIETY DISORDER WITH PANIC ATTACKS: ICD-10-CM

## 2025-05-15 DIAGNOSIS — F41.1 GENERALIZED ANXIETY DISORDER WITH PANIC ATTACKS: ICD-10-CM

## 2025-05-15 RX ORDER — CLONAZEPAM 0.5 MG/1
TABLET ORAL
Qty: 90 TABLET | Refills: 1 | Status: SHIPPED | OUTPATIENT
Start: 2025-05-15

## 2025-05-21 ENCOUNTER — OFFICE VISIT (OUTPATIENT)
Dept: FAMILY MEDICINE | Facility: CLINIC | Age: 40
End: 2025-05-21
Payer: COMMERCIAL

## 2025-05-21 ENCOUNTER — OFFICE VISIT (OUTPATIENT)
Dept: GASTROENTEROLOGY | Facility: CLINIC | Age: 40
End: 2025-05-21
Payer: COMMERCIAL

## 2025-05-21 VITALS
DIASTOLIC BLOOD PRESSURE: 83 MMHG | HEIGHT: 70 IN | TEMPERATURE: 98 F | SYSTOLIC BLOOD PRESSURE: 140 MMHG | HEART RATE: 103 BPM | OXYGEN SATURATION: 97 % | WEIGHT: 250 LBS | BODY MASS INDEX: 35.79 KG/M2

## 2025-05-21 DIAGNOSIS — K52.9 CHRONIC DIARRHEA: Primary | ICD-10-CM

## 2025-05-21 DIAGNOSIS — J06.9 UPPER RESPIRATORY TRACT INFECTION, UNSPECIFIED TYPE: Primary | ICD-10-CM

## 2025-05-21 DIAGNOSIS — R04.2 HEMOPTYSIS: ICD-10-CM

## 2025-05-21 DIAGNOSIS — R10.10 UPPER ABDOMINAL PAIN: ICD-10-CM

## 2025-05-21 PROCEDURE — 3077F SYST BP >= 140 MM HG: CPT | Mod: CPTII,S$GLB,, | Performed by: NURSE PRACTITIONER

## 2025-05-21 PROCEDURE — 99214 OFFICE O/P EST MOD 30 MIN: CPT | Mod: S$GLB,,, | Performed by: NURSE PRACTITIONER

## 2025-05-21 PROCEDURE — 3008F BODY MASS INDEX DOCD: CPT | Mod: CPTII,S$GLB,, | Performed by: NURSE PRACTITIONER

## 2025-05-21 PROCEDURE — 3079F DIAST BP 80-89 MM HG: CPT | Mod: CPTII,S$GLB,, | Performed by: NURSE PRACTITIONER

## 2025-05-21 PROCEDURE — 99999 PR PBB SHADOW E&M-EST. PATIENT-LVL IV: CPT | Mod: PBBFAC,,, | Performed by: NURSE PRACTITIONER

## 2025-05-21 PROCEDURE — 1160F RVW MEDS BY RX/DR IN RCRD: CPT | Mod: CPTII,S$GLB,, | Performed by: NURSE PRACTITIONER

## 2025-05-21 PROCEDURE — 1159F MED LIST DOCD IN RCRD: CPT | Mod: CPTII,S$GLB,, | Performed by: NURSE PRACTITIONER

## 2025-05-21 PROCEDURE — 3044F HG A1C LEVEL LT 7.0%: CPT | Mod: CPTII,S$GLB,, | Performed by: NURSE PRACTITIONER

## 2025-05-21 RX ORDER — DICYCLOMINE HYDROCHLORIDE 20 MG/1
20 TABLET ORAL 3 TIMES DAILY PRN
Qty: 90 TABLET | Refills: 11 | Status: SHIPPED | OUTPATIENT
Start: 2025-05-21

## 2025-05-21 RX ORDER — ALBUTEROL SULFATE 90 UG/1
INHALANT RESPIRATORY (INHALATION)
COMMUNITY
Start: 2025-01-27

## 2025-05-21 NOTE — PROGRESS NOTES
Clinic Follow Up:  [facility] Follow Up Note    Reason for Follow Up:  The primary encounter diagnosis was Chronic diarrhea. A diagnosis of Upper abdominal pain was also pertinent to this visit.    PCP: Jasson Dougherty       The patient location is: [place]   The chief complaint leading to consultation is: abdominal pain, diarrhea     Visit type: audiovisual    Face to Face time with patient: 15 minutes   20 minutes of total time spent on the encounter, which includes face to face time and non-face to face time preparing to see the patient (eg, review of tests), Obtaining and/or reviewing separately obtained history, Documenting clinical information in the electronic or other health record, Independently interpreting results (not separately reported) and communicating results to the patient/family/caregiver, or Care coordination (not separately reported).         Each patient to whom he or she provides medical services by telemedicine is:  (1) informed of the relationship between the physician and patient and the respective role of any other health care provider with respect to management of the patient; and (2) notified that he or she may decline to receive medical services by telemedicine and may withdraw from such care at any time.    Notes:       HPI:  This is a 39 y.o. male here for follow up of abdominal pain.   Was seen in 2023 for GI issues. He is new to me.   Onset: he has had some GI issues since childhood but symptoms worsened 3-4 years ago.   Abdominal pain. Located over epigastric and LUQ.   Also with chronic diarrhea. Has loose bowel movements 3-4 times per day. Eating triggers symptoms. Sometime his abdominal pain improves with defecation.   No nausea or vomiting. No hematochezia or melena.     Prior workup:   2023- had abdomen xray, CT scan, and HIDA-- all unrevealing  2025- abdomen xray- non-specific bowel gas pattern. Some formed and liquid stool seen on imaging.     Review  of Systems   Constitutional:  Negative for activity change and appetite change.        As per interval history above   Respiratory:  Negative for cough and shortness of breath.    Cardiovascular:  Negative for chest pain.   Gastrointestinal:  Positive for abdominal pain and diarrhea. Negative for blood in stool, constipation, nausea and vomiting.   Skin:  Negative for color change and rash.       Medical History:  Past Medical History:   Diagnosis Date    Anxiety     Facial trauma     Keloid cicatrix     Low testosterone     Seasonal allergies        Surgical History:   Past Surgical History:   Procedure Laterality Date    FOREARM FRACTURE SURGERY      FRACTURE SURGERY      NASAL SEPTUM SURGERY      NOSE SURGERY      SINUS SURGERY      TONSILLECTOMY      TYMPANOSTOMY TUBE PLACEMENT         Family History:   Family History   Problem Relation Name Age of Onset    Diabetes Maternal Grandfather Grandfather     Heart disease Maternal Grandfather Grandfather     Melanoma Maternal Grandfather Grandfather     Learning disabilities Mother Mother     Arthritis Maternal Grandmother Grandmother        Social History:   Social History[1]    Allergies: Review of patient's allergies indicates:  No Known Allergies    Home Medications:  Medications Ordered Prior to Encounter[2]    There were no vitals taken for this visit.  There is no height or weight on file to calculate BMI.  Physical Exam  Constitutional:       General: He is not in acute distress.  HENT:      Head: Normocephalic.   Neurological:      General: No focal deficit present.      Mental Status: He is alert.   Psychiatric:         Mood and Affect: Mood normal.         Judgment: Judgment normal.         Labs: Pertinent labs reviewed.  CRC Screening: NA    Assessment:   1. Chronic diarrhea    2. Upper abdominal pain    Chronic GI issues. This is likely IBS based on symptoms and normal workup thus far.     Recommendations:   - trial of lactose and gluten free diet  -  start Metamucil powder.   - Bentyl PRN  - if no improvements, will plan for EGD and colonoscopy     Chronic diarrhea  -     WBC, Stool; Future; Expected date: 05/21/2025  -     Stool Exam-Ova,Cysts,Parasites; Future; Expected date: 05/21/2025  -     Giardia / Cryptosporidum, EIA; Future; Expected date: 05/21/2025  -     Stool culture; Future; Expected date: 05/21/2025  -     Clostridioides difficile EIA; Future; Expected date: 05/21/2025    Upper abdominal pain    Other orders  -     dicyclomine (BENTYL) 20 mg tablet; Take 1 tablet (20 mg total) by mouth 3 (three) times daily as needed (abdominal pain).  Dispense: 90 tablet; Refill: 11    Return to Clinic:  Follow up if symptoms worsen or fail to improve.    Thank you for the opportunity to participate in the care of this patient.  SHAVON Jiménez             [1]   Social History  Tobacco Use    Smoking status: Never     Passive exposure: Never    Smokeless tobacco: Former     Types: Chew    Tobacco comments:     Zyn packs only just nicotine no tabaccoo   Substance Use Topics    Alcohol use: Yes    Drug use: No   [2]   Current Outpatient Medications on File Prior to Visit   Medication Sig Dispense Refill    anastrozole (ARIMIDEX) 1 mg Tab Take 1 tablet (1 mg total) by mouth 3 (three) times a week. 15 tablet 5    clonazePAM (KLONOPIN) 0.5 MG tablet Take 1 tablet three times daily as needed for anxiety. 90 tablet 1    FLUoxetine 20 MG capsule Take 1 capsule (20 mg total) by mouth once daily. 30 capsule 3    fluticasone propionate (FLONASE) 50 mcg/actuation nasal spray 2 sprays (100 mcg total) by Each Nostril route once daily. 16 g 3    levocetirizine (XYZAL) 5 MG tablet Take 1 tablet (5 mg total) by mouth every evening. 90 tablet 3    tretinoin (RETIN-A) 0.025 % cream Apply a pea-sized amount to entire face qhs. 20 g 5    [DISCONTINUED] benzonatate (TESSALON) 100 MG capsule Take 200 mg by mouth 2 (two) times daily as needed.      [DISCONTINUED] guanFACINE  (TENEX) 1 MG Tab Take 1 tablet twice daily. 60 tablet 2    [DISCONTINUED] multivitamin (THERAGRAN) per tablet Take 1 tablet by mouth.      [DISCONTINUED] QUEtiapine (SEROQUEL) 100 MG Tab Take 1/2 to 1 tablet at bedtime. 60 tablet 2     No current facility-administered medications on file prior to visit.

## 2025-05-26 NOTE — PROGRESS NOTES
Subjective:       Patient ID: Rahul Farrar is a 39 y.o. male.    Chief Complaint: coughing up blood    History of Present Illness    Patient presents today with concerns about coughing up blood and phlegm after recent treatment for sinus infection He visited urgent care 3 days ago on the 18th where he received a steroid injection, , and Doxycycline .  He has been coughing up blood and phlegm since yesterday, described as clumps of blood that have not resolved. Three nights ago, he woke up at 3:00 AM with a severe cough. He experiences post-nasal drip and reports his allergies have been acting up. He denies smoking, recent fevers, or shortness of breath.      ROS:  General: -fever, -chills, -fatigue, -weight gain, -weight loss  Eyes: -vision changes, -redness, -discharge  ENT: -ear pain, -nasal congestion, -sore throat, +post nasal drip  Cardiovascular: -chest pain, -palpitations, -lower extremity edema  Respiratory: +cough, -shortness of breath, +productive cough, +waking at night coughing  Gastrointestinal: -abdominal pain, -nausea, -vomiting, -diarrhea, -constipation, -blood in stool  Genitourinary: -dysuria, -hematuria, -frequency  Musculoskeletal: -joint pain, -muscle pain  Skin: -rash, -lesion  Neurological: -headache, -dizziness, -numbness, -tingling  Psychiatric: -anxiety, -depression, -sleep difficulty  Allergic: +allergic reactions        Past Medical History:   Diagnosis Date    Anxiety     Facial trauma     Keloid cicatrix     Low testosterone     Seasonal allergies       Medications Ordered Prior to Encounter[1]       Objective:      Physical Exam    General: In no acute distress.  Head: Normocephalic. Non traumatic.  Eyes: PERRLA. EOMs full. Conjunctivae clear. Fundi grossly normal.  Ears: EACs clear. TMs normal.  Nose: Mucosa pink. Mucosa moist. No obstruction.  Throat: Clear. No exudates. No lesions.  Neck: Supple. No masses. No thyromegaly. No bruits.  Chest: Lungs clear. No rales. No rhonchi.  No wheezes.  Pink tinged sputum noted  Heart: RRR. No murmurs. No rubs. No gallops.  Abdomen: Soft. No tenderness. No masses. BS normal.  : Normal external genitalia. No lesions. No discharge. No hernias  noted.  Back: Normal curvature. No scoliosis. No tenderness.  Extremities: Warm. Well perfused. No upper extremity edema. No lower extremity edema. FROM. No deformities. No joint erythema.  Neuro: No focal deficits appreciated. Good muscle tone. Normal response to visual stimuli. Normal response to auditory stimuli.  Skin: Normal. No rashes. No lesions noted.          Assessment/Plan:     1. Upper respiratory tract infection, unspecified type    2. Hemoptysis       Assessment & Plan    ACUTE SINUSITIS:  - -- Will continue current antibiotic regimen and steroid treatment.  - Advised patient to complete the full course of prescribed medications.  - Noted that sinus infection is causing coughing and throat irritation.    HEMOPTYSIS:  - Evaluated patient's complaint of coughing up blood and phlegm.  - Determined blood-tinged sputum is likely due to irritation and fragility of oral and throat mucosa from excessive coughing.  - Explained this can be common with throat irritation,   - Ordered chest radiograph as a precautionary measure to further evaluate.    POSTNASAL DRIP:  - Noted patient reports having a drip at the back of the throat.  - Upon evaluation, determined this is not clinically concerning.    ALLERGIC RHINITIS:  - Noted patient reports allergies exacerbation causing nocturnal coughing.               No follow-ups on file.    This note was generated with the assistance of ambient listening technology. Verbal consent was obtained by the patient and accompanying visitor(s) for the recording of patient appointment to facilitate this note. I attest to having reviewed and edited the generated note for accuracy, though some syntax or spelling errors may persist. Please contact the author of this note for any  clarification.            [1]   Current Outpatient Medications on File Prior to Visit   Medication Sig Dispense Refill    albuterol (PROVENTIL/VENTOLIN HFA) 90 mcg/actuation inhaler Inhale into the lungs.      anastrozole (ARIMIDEX) 1 mg Tab Take 1 tablet (1 mg total) by mouth 3 (three) times a week. 15 tablet 5    clonazePAM (KLONOPIN) 0.5 MG tablet Take 1 tablet three times daily as needed for anxiety. 90 tablet 1    dicyclomine (BENTYL) 20 mg tablet Take 1 tablet (20 mg total) by mouth 3 (three) times daily as needed (abdominal pain). 90 tablet 11    FLUoxetine 20 MG capsule Take 1 capsule (20 mg total) by mouth once daily. 30 capsule 3    fluticasone propionate (FLONASE) 50 mcg/actuation nasal spray 2 sprays (100 mcg total) by Each Nostril route once daily. 16 g 3    levocetirizine (XYZAL) 5 MG tablet Take 1 tablet (5 mg total) by mouth every evening. 90 tablet 3    tretinoin (RETIN-A) 0.025 % cream Apply a pea-sized amount to entire face qhs. 20 g 5     No current facility-administered medications on file prior to visit.

## 2025-07-22 ENCOUNTER — PATIENT MESSAGE (OUTPATIENT)
Dept: UROLOGY | Facility: CLINIC | Age: 40
End: 2025-07-22
Payer: COMMERCIAL

## 2025-07-22 ENCOUNTER — TELEPHONE (OUTPATIENT)
Dept: UROLOGY | Facility: CLINIC | Age: 40
End: 2025-07-22
Payer: COMMERCIAL

## 2025-07-22 NOTE — TELEPHONE ENCOUNTER
Message sent to patient.   Copied from CRM #[id number]. Topic: Appointments - Appointment Rescheduling  >> Jul 22, 2025  4:18 PM Monse wrote:  .Type:  Sooner Apoointment Request    Caller is requesting a sooner appointment.  Caller declined first available appointment listed below.  Caller will not accept being placed on the waitlist and is requesting a message be sent to doctor.  Name of Caller:.Rahul Farrar  When is the first available appointment?7/23  Symptoms:  Would the patient rather a call back or a response via [facility]? Call back  Best Call Back Number:.[phone]    Additional Information: pt is calling due to he will have to reschedule because his work schedule has changed. Pt is off on Fridays now   Physical Therapy                    Therapist attempted treatment. Patient asleep at this time. Will return as able        Therapy procedure time and total treatment time can be found documented on the Time Entry flowsheet

## 2025-08-01 ENCOUNTER — PROCEDURE VISIT (OUTPATIENT)
Dept: UROLOGY | Facility: CLINIC | Age: 40
End: 2025-08-01
Payer: COMMERCIAL

## 2025-08-01 DIAGNOSIS — N45.3 EPIDIDYMO-ORCHITIS: ICD-10-CM

## 2025-08-01 DIAGNOSIS — E29.1 TESTICULAR HYPOGONADISM: Primary | ICD-10-CM

## 2025-08-01 RX ORDER — ANASTROZOLE 1 MG/1
1 TABLET ORAL
Qty: 15 TABLET | Refills: 5 | Status: SHIPPED | OUTPATIENT
Start: 2025-08-01

## 2025-08-01 RX ORDER — LEVOFLOXACIN 500 MG/1
500 TABLET, FILM COATED ORAL DAILY
Qty: 3 TABLET | Refills: 0 | Status: SHIPPED | OUTPATIENT
Start: 2025-08-01 | End: 2025-08-04

## 2025-08-01 NOTE — PROCEDURES
Procedures  Chief Complaint:   Encounter Diagnoses   Name Primary?    Testicular hypogonadism Yes    Epididymo-orchitis        HPI:   8/1/25- here for his testopel insertion.    7/20/22- LR- Patient is a 36-year-old male that is presenting with concerns of hypogonadism.  Patient was seen several years ago and total T was at the low-side of normal. Patient states that he and his wife are no longer wanting any more children and he would like to discuss possible testosterone replacement therapy.  No labs in the last 16 months.  Patient states that he has had fatigue and energy level has greatly decreased.Normal sexual function.       Allergies:  Patient has no known allergies.    Medications:  has a current medication list which includes the following prescription(s): clonazepam, multivitamin, needle (disp) 21 g, safety needles, syringe (disposable), testosterone cypionate, escitalopram oxalate, and meloxicam.    Review of Systems:  General: No fever, chills, fatigability, or weight loss.  Skin: No rashes, itching, or changes in color or texture of skin.  Chest: Denies LAKHANI, cyanosis, wheezing, cough, and sputum production.  Abdomen: Appetite fine. No weight loss. Denies diarrhea, abdominal pain, hematemesis, or blood in stool.  Musculoskeletal: No joint stiffness or swelling. Denies back pain.  : As above.  All other review of systems negative.    PMH:  Hypogonadism in males    PSH:   has a past surgical history that includes Nose surgery; Forearm fracture surgery; Tympanostomy tube placement; and Sinus surgery.    FamHx: family history includes Diabetes in his maternal grandfather; Heart disease in his maternal grandfather.    SocHx:  reports that he has never smoked. His smokeless tobacco use includes chew. He reports current alcohol use. He reports that he does not use drugs.      Physical Exam:  Vitals:    01/18/23 0942   BP: 134/85   Pulse: 87     General: A&Ox3, no apparent distress, no deformities  Neck: No  masses, normal ROM  Lungs: normal inspiration, no use of accessory muscles  Heart: normal pulse, no arrhythmias  Abdomen: Soft, NT, ND, no masses, no hernias, no hepatosplenomegaly  Skin: The skin is warm and dry. No jaundice.  Ext: No c/c/e.  : 7/23- previous vas site can be palpated, but no specific abnl.  Currently not too tender to palpation.    Labs/Studies:   Testopel  8/1/25, 4/17/25, 1/13/25, 10/21/24, 4/17/24, 1/31/24, 10/27/23, 7/26/23, 4/26/23, 1/18/23  Testosterone 297 7/22  Estrogen 28 7/24  Estrogen 118 6/23    Patient was sterilely prepped and draped, then positioned in the right side up, lateral decubitus position.  Lidocaine was used for local anesthesia.  Eleven blade was used to incise the skin, included trocars with the testopel kit were then used.  They were inserted within the incision site and we placed the pellets in a V location.  6 pellets total were inserted without difficulty.  Trocars were removed and pressure was applied for 2 min.  Steri-Strips applied for local coverage, he will return for lab assessment in 3 months.      Impression/Plan:     1. Hypogonadism- patient tolerated insertion of testopel well, call with any complaints.  Patient is also currently on anastrozole.  Otherwise see me in 3 months for labs and reinsertion.      2. Epididymo-orchitis- call with any evidence of recurrence.

## 2025-08-09 ENCOUNTER — PATIENT MESSAGE (OUTPATIENT)
Dept: PSYCHIATRY | Facility: CLINIC | Age: 40
End: 2025-08-09
Payer: COMMERCIAL

## 2025-08-11 DIAGNOSIS — F41.1 GENERALIZED ANXIETY DISORDER WITH PANIC ATTACKS: ICD-10-CM

## 2025-08-11 DIAGNOSIS — F41.0 GENERALIZED ANXIETY DISORDER WITH PANIC ATTACKS: ICD-10-CM

## 2025-08-11 RX ORDER — CLONAZEPAM 0.5 MG/1
TABLET ORAL
Qty: 90 TABLET | Refills: 1 | Status: SHIPPED | OUTPATIENT
Start: 2025-08-11